# Patient Record
Sex: MALE | Race: WHITE | NOT HISPANIC OR LATINO | Employment: OTHER | URBAN - METROPOLITAN AREA
[De-identification: names, ages, dates, MRNs, and addresses within clinical notes are randomized per-mention and may not be internally consistent; named-entity substitution may affect disease eponyms.]

---

## 2012-05-30 LAB — CRC RECOMMENDATION EXT: NORMAL

## 2014-07-22 LAB — CRC RECOMMENDATION EXT: NORMAL

## 2017-01-23 ENCOUNTER — HISTORICAL (OUTPATIENT)
Dept: LAB | Facility: HOSPITAL | Age: 77
End: 2017-01-23

## 2017-10-24 LAB — CRC RECOMMENDATION EXT: NORMAL

## 2018-01-24 ENCOUNTER — HISTORICAL (OUTPATIENT)
Dept: LAB | Facility: HOSPITAL | Age: 78
End: 2018-01-24

## 2018-01-24 LAB
CHOLEST SERPL-MCNC: 176 MG/DL (ref 50–200)
CHOLEST/HDLC SERPL: 5 {RATIO} (ref 0–5)
HDLC SERPL-MCNC: 38 MG/DL (ref 35–60)
LDLC SERPL CALC-MCNC: 109 MG/DL (ref 50–140)
TRIGL SERPL-MCNC: 145 MG/DL (ref 30–150)
VLDLC SERPL CALC-MCNC: 29 MG/DL

## 2019-01-28 ENCOUNTER — HISTORICAL (OUTPATIENT)
Dept: LAB | Facility: HOSPITAL | Age: 79
End: 2019-01-28

## 2019-01-28 LAB
CHOLEST SERPL-MCNC: 171 MG/DL (ref 50–200)
CHOLEST/HDLC SERPL: 5 {RATIO} (ref 0–5)
HDLC SERPL-MCNC: 37 MG/DL (ref 35–60)
LDLC SERPL CALC-MCNC: 100 MG/DL (ref 50–140)
TRIGL SERPL-MCNC: 169 MG/DL (ref 30–150)
VLDLC SERPL CALC-MCNC: 34 MG/DL

## 2020-03-23 ENCOUNTER — HISTORICAL (OUTPATIENT)
Dept: LAB | Facility: HOSPITAL | Age: 80
End: 2020-03-23

## 2020-03-23 LAB
CHOLEST SERPL-MCNC: 154 MG/DL
CHOLEST/HDLC SERPL: 4 {RATIO} (ref 0–5)
HDLC SERPL-MCNC: 37 MG/DL (ref 35–60)
LDLC SERPL CALC-MCNC: 96 MG/DL (ref 50–140)
TRIGL SERPL-MCNC: 103 MG/DL (ref 34–140)
VLDLC SERPL CALC-MCNC: 21 MG/DL

## 2020-08-25 ENCOUNTER — HISTORICAL (OUTPATIENT)
Dept: RADIOLOGY | Facility: HOSPITAL | Age: 80
End: 2020-08-25

## 2020-08-25 LAB
ABS NEUT (OLG): 6.59
BASOPHILS # BLD AUTO: 0.05 X10(3)/MCL
BASOPHILS NFR BLD AUTO: 0.6 %
EOSINOPHIL # BLD AUTO: 0.1 X10(3)/MCL
EOSINOPHIL NFR BLD AUTO: 1.2 %
ERYTHROCYTE [DISTWIDTH] IN BLOOD BY AUTOMATED COUNT: 14 %
ERYTHROCYTE [SEDIMENTATION RATE] IN BLOOD: 7 MM/HR (ref 0–20)
HCT VFR BLD AUTO: 45.1 % (ref 39–49)
HGB BLD-MCNC: 14.9 GM/DL (ref 12.6–16.6)
IMM GRANULOCYTES # BLD AUTO: 0.13 10*3/UL (ref 0–0.1)
IMM GRANULOCYTES NFR BLD AUTO: 1.5 % (ref 0–1)
LYMPHOCYTES # BLD AUTO: 1.25 X10(3)/MCL
LYMPHOCYTES NFR BLD AUTO: 14.5 %
MCH RBC QN AUTO: 29 PG (ref 27–33)
MCHC RBC AUTO-ENTMCNC: 33 GM/DL (ref 32–35)
MCV RBC AUTO: 87.7 FL (ref 84–97)
MONOCYTES # BLD AUTO: 0.52 X10(3)/MCL
MONOCYTES NFR BLD AUTO: 6 %
NEUTROPHILS # BLD AUTO: 6.59 X10(3)/MCL
NEUTROPHILS NFR BLD AUTO: 76.2 %
PLATELET # BLD AUTO: 205 X10(3)/MCL (ref 140–450)
PMV BLD AUTO: 10 FL
RBC # BLD AUTO: 5.14 X10(6)/MCL (ref 4.3–5.6)
RHEUMATOID FACT SERPL-ACNC: <13 IU/ML
URATE SERPL-MCNC: 6.8 MG/DL (ref 3.8–7)
WBC # SPEC AUTO: 8.64 X10(3)/MCL (ref 3.4–9.2)

## 2020-09-28 ENCOUNTER — HISTORICAL (OUTPATIENT)
Dept: LAB | Facility: HOSPITAL | Age: 80
End: 2020-09-28

## 2020-09-28 LAB
CHOLEST SERPL-MCNC: 168 MG/DL
CHOLEST/HDLC SERPL: 4 {RATIO} (ref 0–5)
HDLC SERPL-MCNC: 38 MG/DL (ref 35–60)
LDLC SERPL CALC-MCNC: 107 MG/DL (ref 50–140)
TRIGL SERPL-MCNC: 117 MG/DL (ref 34–140)
VLDLC SERPL CALC-MCNC: 23 MG/DL

## 2021-05-04 ENCOUNTER — HISTORICAL (OUTPATIENT)
Dept: LAB | Facility: HOSPITAL | Age: 81
End: 2021-05-04

## 2021-05-04 LAB
CHOLEST SERPL-MCNC: 160 MG/DL
CHOLEST/HDLC SERPL: 5 {RATIO} (ref 0–5)
HDLC SERPL-MCNC: 35 MG/DL (ref 35–60)
LDLC SERPL CALC-MCNC: 102 MG/DL (ref 50–140)
TRIGL SERPL-MCNC: 117 MG/DL (ref 34–140)
VLDLC SERPL CALC-MCNC: 23 MG/DL

## 2022-12-27 DIAGNOSIS — I10 HYPERTENSION, UNSPECIFIED TYPE: Primary | ICD-10-CM

## 2022-12-27 RX ORDER — LOSARTAN POTASSIUM 50 MG/1
TABLET ORAL
Qty: 90 TABLET | Refills: 3 | Status: SHIPPED | OUTPATIENT
Start: 2022-12-27 | End: 2023-01-01

## 2022-12-28 RX ORDER — ATENOLOL 25 MG/1
25 TABLET ORAL DAILY
Qty: 30 TABLET | Refills: 2 | Status: SHIPPED | OUTPATIENT
Start: 2022-12-28 | End: 2023-01-01

## 2022-12-28 RX ORDER — ATENOLOL 25 MG/1
25 TABLET ORAL DAILY
COMMUNITY
Start: 2022-11-11 | End: 2022-12-28 | Stop reason: SDUPTHER

## 2023-01-01 ENCOUNTER — ANESTHESIA (OUTPATIENT)
Dept: SURGERY | Facility: HOSPITAL | Age: 83
DRG: 871 | End: 2023-01-01
Payer: MEDICARE

## 2023-01-01 ENCOUNTER — HOSPITAL ENCOUNTER (OUTPATIENT)
Dept: CARDIOLOGY | Facility: HOSPITAL | Age: 83
Discharge: HOME | End: 2023-09-26
Attending: INTERNAL MEDICINE | Admitting: INTERNAL MEDICINE
Payer: MEDICARE

## 2023-01-01 ENCOUNTER — LAB VISIT (OUTPATIENT)
Dept: LAB | Facility: HOSPITAL | Age: 83
End: 2023-01-01
Attending: NURSE PRACTITIONER
Payer: MEDICARE

## 2023-01-01 ENCOUNTER — ANESTHESIA EVENT (OUTPATIENT)
Dept: MEDSURG UNIT | Facility: HOSPITAL | Age: 83
End: 2023-01-01

## 2023-01-01 ENCOUNTER — ANESTHESIA (OUTPATIENT)
Dept: MEDSURG UNIT | Facility: HOSPITAL | Age: 83
End: 2023-01-01

## 2023-01-01 ENCOUNTER — HOSPITAL ENCOUNTER (EMERGENCY)
Facility: HOSPITAL | Age: 83
Discharge: HOME | End: 2023-05-12
Attending: FAMILY MEDICINE
Payer: MEDICARE

## 2023-01-01 ENCOUNTER — HOSPITAL ENCOUNTER (INPATIENT)
Facility: HOSPITAL | Age: 83
LOS: 3 days | Discharge: HOME HEALTH | DRG: 947 | End: 2023-12-21
Attending: INTERNAL MEDICINE | Admitting: INTERNAL MEDICINE
Payer: MEDICARE

## 2023-01-01 ENCOUNTER — OFFICE VISIT (OUTPATIENT)
Dept: FAMILY MEDICINE | Facility: CLINIC | Age: 83
End: 2023-01-01
Payer: MEDICARE

## 2023-01-01 ENCOUNTER — ANESTHESIA EVENT (OUTPATIENT)
Dept: CARDIOLOGY | Facility: HOSPITAL | Age: 83
End: 2023-01-01
Payer: MEDICARE

## 2023-01-01 ENCOUNTER — APPOINTMENT (OUTPATIENT)
Dept: LAB | Facility: HOSPITAL | Age: 83
End: 2023-01-01
Payer: MEDICARE

## 2023-01-01 ENCOUNTER — DOCUMENTATION ONLY (OUTPATIENT)
Dept: FAMILY MEDICINE | Facility: CLINIC | Age: 83
End: 2023-01-01
Payer: MEDICARE

## 2023-01-01 ENCOUNTER — PATIENT OUTREACH (OUTPATIENT)
Dept: ADMINISTRATIVE | Facility: CLINIC | Age: 83
End: 2023-01-01
Payer: MEDICARE

## 2023-01-01 ENCOUNTER — HOSPITAL ENCOUNTER (INPATIENT)
Facility: HOSPITAL | Age: 83
LOS: 4 days | Discharge: HOME | DRG: 872 | End: 2023-06-20
Attending: GENERAL PRACTICE | Admitting: FAMILY MEDICINE
Payer: MEDICARE

## 2023-01-01 ENCOUNTER — CLINICAL SUPPORT (OUTPATIENT)
Dept: RESPIRATORY THERAPY | Facility: HOSPITAL | Age: 83
End: 2023-01-01
Attending: NURSE PRACTITIONER
Payer: MEDICARE

## 2023-01-01 ENCOUNTER — TELEPHONE (OUTPATIENT)
Dept: FAMILY MEDICINE | Facility: CLINIC | Age: 83
End: 2023-01-01
Payer: MEDICARE

## 2023-01-01 ENCOUNTER — LAB VISIT (OUTPATIENT)
Dept: LAB | Facility: HOSPITAL | Age: 83
End: 2023-01-01
Attending: INTERNAL MEDICINE
Payer: MEDICARE

## 2023-01-01 ENCOUNTER — HOSPITAL ENCOUNTER (OUTPATIENT)
Dept: CARDIOLOGY | Facility: HOSPITAL | Age: 83
Discharge: HOME | End: 2023-09-14
Attending: INTERNAL MEDICINE
Payer: MEDICARE

## 2023-01-01 ENCOUNTER — HOSPITAL ENCOUNTER (INPATIENT)
Facility: HOSPITAL | Age: 83
LOS: 2 days | Discharge: NURSING, REHAB OR LONG-TERM CARE | DRG: 308 | End: 2023-12-29
Attending: STUDENT IN AN ORGANIZED HEALTH CARE EDUCATION/TRAINING PROGRAM | Admitting: FAMILY MEDICINE
Payer: MEDICARE

## 2023-01-01 ENCOUNTER — HOSPITAL ENCOUNTER (INPATIENT)
Facility: HOSPITAL | Age: 83
LOS: 3 days | Discharge: NURSING, REHAB OR LONG-TERM CARE | DRG: 314 | End: 2023-12-18
Attending: GENERAL PRACTICE | Admitting: FAMILY MEDICINE
Payer: MEDICARE

## 2023-01-01 ENCOUNTER — HOSPITAL ENCOUNTER (INPATIENT)
Facility: HOSPITAL | Age: 83
LOS: 8 days | Discharge: HOME | DRG: 871 | End: 2023-07-03
Attending: GENERAL PRACTICE | Admitting: INTERNAL MEDICINE
Payer: MEDICARE

## 2023-01-01 ENCOUNTER — ANESTHESIA (OUTPATIENT)
Dept: CARDIOLOGY | Facility: HOSPITAL | Age: 83
End: 2023-01-01
Payer: MEDICARE

## 2023-01-01 ENCOUNTER — INFUSION (OUTPATIENT)
Dept: INFUSION THERAPY | Facility: HOSPITAL | Age: 83
End: 2023-01-01
Attending: FAMILY MEDICINE
Payer: MEDICARE

## 2023-01-01 ENCOUNTER — ANESTHESIA EVENT (OUTPATIENT)
Dept: SURGERY | Facility: HOSPITAL | Age: 83
DRG: 871 | End: 2023-01-01
Payer: MEDICARE

## 2023-01-01 VITALS
TEMPERATURE: 98 F | WEIGHT: 170.44 LBS | RESPIRATION RATE: 21 BRPM | DIASTOLIC BLOOD PRESSURE: 73 MMHG | HEIGHT: 65 IN | SYSTOLIC BLOOD PRESSURE: 124 MMHG | OXYGEN SATURATION: 94 % | BODY MASS INDEX: 28.4 KG/M2 | HEART RATE: 83 BPM

## 2023-01-01 VITALS
BODY MASS INDEX: 28.39 KG/M2 | DIASTOLIC BLOOD PRESSURE: 64 MMHG | HEART RATE: 60 BPM | TEMPERATURE: 97 F | WEIGHT: 170.38 LBS | RESPIRATION RATE: 16 BRPM | SYSTOLIC BLOOD PRESSURE: 130 MMHG | HEIGHT: 65 IN | OXYGEN SATURATION: 98 %

## 2023-01-01 VITALS
OXYGEN SATURATION: 98 % | WEIGHT: 159 LBS | HEART RATE: 86 BPM | SYSTOLIC BLOOD PRESSURE: 98 MMHG | RESPIRATION RATE: 16 BRPM | DIASTOLIC BLOOD PRESSURE: 62 MMHG | TEMPERATURE: 97 F | BODY MASS INDEX: 26.49 KG/M2 | HEIGHT: 65 IN

## 2023-01-01 VITALS
OXYGEN SATURATION: 97 % | WEIGHT: 156.94 LBS | WEIGHT: 157.63 LBS | BODY MASS INDEX: 26.26 KG/M2 | BODY MASS INDEX: 26.15 KG/M2 | HEART RATE: 64 BPM | HEIGHT: 65 IN | TEMPERATURE: 98 F | DIASTOLIC BLOOD PRESSURE: 62 MMHG | RESPIRATION RATE: 21 BRPM | TEMPERATURE: 98 F | HEART RATE: 78 BPM | SYSTOLIC BLOOD PRESSURE: 109 MMHG | SYSTOLIC BLOOD PRESSURE: 105 MMHG | HEIGHT: 65 IN | DIASTOLIC BLOOD PRESSURE: 57 MMHG | RESPIRATION RATE: 22 BRPM | OXYGEN SATURATION: 91 %

## 2023-01-01 VITALS
WEIGHT: 164 LBS | BODY MASS INDEX: 27.32 KG/M2 | HEIGHT: 65 IN | OXYGEN SATURATION: 98 % | TEMPERATURE: 98 F | DIASTOLIC BLOOD PRESSURE: 65 MMHG | SYSTOLIC BLOOD PRESSURE: 103 MMHG | HEART RATE: 73 BPM | RESPIRATION RATE: 18 BRPM

## 2023-01-01 VITALS
SYSTOLIC BLOOD PRESSURE: 88 MMHG | BODY MASS INDEX: 27.32 KG/M2 | TEMPERATURE: 97 F | DIASTOLIC BLOOD PRESSURE: 44 MMHG | WEIGHT: 164 LBS | OXYGEN SATURATION: 98 % | RESPIRATION RATE: 16 BRPM | HEART RATE: 81 BPM | HEIGHT: 65 IN

## 2023-01-01 VITALS
WEIGHT: 155 LBS | HEIGHT: 65 IN | RESPIRATION RATE: 18 BRPM | OXYGEN SATURATION: 95 % | TEMPERATURE: 98 F | RESPIRATION RATE: 20 BRPM | DIASTOLIC BLOOD PRESSURE: 67 MMHG | HEIGHT: 65 IN | TEMPERATURE: 98 F | WEIGHT: 155.19 LBS | HEART RATE: 102 BPM | BODY MASS INDEX: 25.85 KG/M2 | SYSTOLIC BLOOD PRESSURE: 142 MMHG | BODY MASS INDEX: 25.83 KG/M2 | DIASTOLIC BLOOD PRESSURE: 84 MMHG | OXYGEN SATURATION: 99 % | HEART RATE: 92 BPM | SYSTOLIC BLOOD PRESSURE: 102 MMHG

## 2023-01-01 VITALS
HEART RATE: 58 BPM | HEIGHT: 65 IN | BODY MASS INDEX: 28.32 KG/M2 | DIASTOLIC BLOOD PRESSURE: 70 MMHG | TEMPERATURE: 99 F | OXYGEN SATURATION: 100 % | WEIGHT: 170 LBS | RESPIRATION RATE: 18 BRPM | SYSTOLIC BLOOD PRESSURE: 137 MMHG

## 2023-01-01 VITALS
DIASTOLIC BLOOD PRESSURE: 68 MMHG | DIASTOLIC BLOOD PRESSURE: 65 MMHG | BODY MASS INDEX: 26.16 KG/M2 | SYSTOLIC BLOOD PRESSURE: 108 MMHG | WEIGHT: 146.63 LBS | HEART RATE: 59 BPM | TEMPERATURE: 96 F | HEIGHT: 65 IN | BODY MASS INDEX: 24.43 KG/M2 | RESPIRATION RATE: 16 BRPM | SYSTOLIC BLOOD PRESSURE: 97 MMHG | RESPIRATION RATE: 22 BRPM | WEIGHT: 157 LBS | HEIGHT: 65 IN | HEART RATE: 71 BPM | TEMPERATURE: 96 F

## 2023-01-01 VITALS
DIASTOLIC BLOOD PRESSURE: 56 MMHG | HEIGHT: 65 IN | RESPIRATION RATE: 20 BRPM | HEART RATE: 78 BPM | OXYGEN SATURATION: 99 % | WEIGHT: 145.75 LBS | TEMPERATURE: 98 F | BODY MASS INDEX: 24.28 KG/M2 | SYSTOLIC BLOOD PRESSURE: 135 MMHG

## 2023-01-01 VITALS
OXYGEN SATURATION: 98 % | RESPIRATION RATE: 18 BRPM | WEIGHT: 155 LBS | BODY MASS INDEX: 25.83 KG/M2 | DIASTOLIC BLOOD PRESSURE: 70 MMHG | TEMPERATURE: 98 F | HEIGHT: 65 IN | SYSTOLIC BLOOD PRESSURE: 106 MMHG | HEART RATE: 82 BPM

## 2023-01-01 VITALS
WEIGHT: 173 LBS | OXYGEN SATURATION: 97 % | DIASTOLIC BLOOD PRESSURE: 74 MMHG | HEIGHT: 65 IN | SYSTOLIC BLOOD PRESSURE: 130 MMHG | RESPIRATION RATE: 20 BRPM | BODY MASS INDEX: 28.82 KG/M2 | HEART RATE: 64 BPM | TEMPERATURE: 97 F

## 2023-01-01 VITALS
DIASTOLIC BLOOD PRESSURE: 77 MMHG | RESPIRATION RATE: 18 BRPM | HEIGHT: 65 IN | HEART RATE: 64 BPM | OXYGEN SATURATION: 98 % | TEMPERATURE: 98 F | BODY MASS INDEX: 28.25 KG/M2 | SYSTOLIC BLOOD PRESSURE: 147 MMHG | WEIGHT: 169.56 LBS

## 2023-01-01 DIAGNOSIS — R53.81 PHYSICAL DECONDITIONING: ICD-10-CM

## 2023-01-01 DIAGNOSIS — R00.9 ABNORMAL HEART RATE: ICD-10-CM

## 2023-01-01 DIAGNOSIS — I10 PRIMARY HYPERTENSION: Chronic | ICD-10-CM

## 2023-01-01 DIAGNOSIS — I95.0 IDIOPATHIC HYPOTENSION: Primary | ICD-10-CM

## 2023-01-01 DIAGNOSIS — Z79.01 LONG TERM (CURRENT) USE OF ANTICOAGULANTS: ICD-10-CM

## 2023-01-01 DIAGNOSIS — K50.00 TERMINAL ILEITIS WITHOUT COMPLICATION: Primary | ICD-10-CM

## 2023-01-01 DIAGNOSIS — R53.1 WEAKNESS: ICD-10-CM

## 2023-01-01 DIAGNOSIS — I25.10 CORONARY ARTERY DISEASE, UNSPECIFIED VESSEL OR LESION TYPE, UNSPECIFIED WHETHER ANGINA PRESENT, UNSPECIFIED WHETHER NATIVE OR TRANSPLANTED HEART: Primary | ICD-10-CM

## 2023-01-01 DIAGNOSIS — E86.0 DEHYDRATION: ICD-10-CM

## 2023-01-01 DIAGNOSIS — N39.0 URINARY TRACT INFECTION ASSOCIATED WITH INDWELLING URETHRAL CATHETER, INITIAL ENCOUNTER: Primary | ICD-10-CM

## 2023-01-01 DIAGNOSIS — G62.9 NEUROPATHY: ICD-10-CM

## 2023-01-01 DIAGNOSIS — I48.91 ATRIAL FIBRILLATION: ICD-10-CM

## 2023-01-01 DIAGNOSIS — R33.9 URINARY RETENTION: ICD-10-CM

## 2023-01-01 DIAGNOSIS — E78.5 HYPERLIPIDEMIA, UNSPECIFIED HYPERLIPIDEMIA TYPE: Primary | ICD-10-CM

## 2023-01-01 DIAGNOSIS — Z85.038 PERSONAL HISTORY OF COLON CANCER: Primary | ICD-10-CM

## 2023-01-01 DIAGNOSIS — K50.00: ICD-10-CM

## 2023-01-01 DIAGNOSIS — E66.9 OBESITY, UNSPECIFIED CLASSIFICATION, UNSPECIFIED OBESITY TYPE, UNSPECIFIED WHETHER SERIOUS COMORBIDITY PRESENT: ICD-10-CM

## 2023-01-01 DIAGNOSIS — R06.00 DYSPNEA, UNSPECIFIED TYPE: ICD-10-CM

## 2023-01-01 DIAGNOSIS — K56.7 ILEUS DUE TO INFECTION: ICD-10-CM

## 2023-01-01 DIAGNOSIS — G62.9 NEUROPATHY: Primary | ICD-10-CM

## 2023-01-01 DIAGNOSIS — E43 SEVERE PROTEIN-CALORIE MALNUTRITION: Primary | ICD-10-CM

## 2023-01-01 DIAGNOSIS — I48.91 A-FIB: ICD-10-CM

## 2023-01-01 DIAGNOSIS — R19.7 DIARRHEA OF PRESUMED INFECTIOUS ORIGIN: ICD-10-CM

## 2023-01-01 DIAGNOSIS — I10 HYPERTENSION, UNSPECIFIED TYPE: ICD-10-CM

## 2023-01-01 DIAGNOSIS — M19.90 OSTEOARTHRITIS, UNSPECIFIED OSTEOARTHRITIS TYPE, UNSPECIFIED SITE: ICD-10-CM

## 2023-01-01 DIAGNOSIS — Z86.010 PERSONAL HISTORY OF COLONIC POLYPS: ICD-10-CM

## 2023-01-01 DIAGNOSIS — K50.00 CROHN'S DISEASE OF SMALL INTESTINE: Primary | ICD-10-CM

## 2023-01-01 DIAGNOSIS — I48.20 CHRONIC ATRIAL FIBRILLATION: ICD-10-CM

## 2023-01-01 DIAGNOSIS — I48.0 PAROXYSMAL ATRIAL FIBRILLATION: ICD-10-CM

## 2023-01-01 DIAGNOSIS — T83.511A URINARY TRACT INFECTION ASSOCIATED WITH INDWELLING URETHRAL CATHETER, INITIAL ENCOUNTER: Primary | ICD-10-CM

## 2023-01-01 DIAGNOSIS — K50.80 CROHN'S DISEASE OF BOTH SMALL AND LARGE INTESTINE WITHOUT COMPLICATION: Primary | Chronic | ICD-10-CM

## 2023-01-01 DIAGNOSIS — K50.018 CROHN'S DISEASE OF SMALL INTESTINE WITH OTHER COMPLICATION: Primary | ICD-10-CM

## 2023-01-01 DIAGNOSIS — E78.5 HYPERLIPIDEMIA, UNSPECIFIED HYPERLIPIDEMIA TYPE: ICD-10-CM

## 2023-01-01 DIAGNOSIS — R60.1 ANASARCA: ICD-10-CM

## 2023-01-01 DIAGNOSIS — R07.9 CHEST PAIN: ICD-10-CM

## 2023-01-01 DIAGNOSIS — I25.10 CORONARY ARTERY DISEASE: ICD-10-CM

## 2023-01-01 DIAGNOSIS — R00.1 BRADYCARDIA: ICD-10-CM

## 2023-01-01 DIAGNOSIS — Z91.81 RISK FOR FALLS: ICD-10-CM

## 2023-01-01 DIAGNOSIS — Z12.5 ENCOUNTER FOR SCREENING FOR MALIGNANT NEOPLASM OF PROSTATE: ICD-10-CM

## 2023-01-01 DIAGNOSIS — Z01.818 PRE-OP TESTING: ICD-10-CM

## 2023-01-01 DIAGNOSIS — R06.02 SOB (SHORTNESS OF BREATH): ICD-10-CM

## 2023-01-01 DIAGNOSIS — R29.898 WEAKNESS OF LOWER EXTREMITY, UNSPECIFIED LATERALITY: ICD-10-CM

## 2023-01-01 DIAGNOSIS — E87.20 LACTIC ACIDOSIS: Primary | ICD-10-CM

## 2023-01-01 DIAGNOSIS — I25.10 ARTERIOSCLEROSIS OF CORONARY ARTERY: ICD-10-CM

## 2023-01-01 DIAGNOSIS — S09.90XA INJURY OF HEAD, INITIAL ENCOUNTER: Primary | ICD-10-CM

## 2023-01-01 DIAGNOSIS — I48.0 PAF (PAROXYSMAL ATRIAL FIBRILLATION): ICD-10-CM

## 2023-01-01 DIAGNOSIS — R53.81 PHYSICAL DECONDITIONING: Primary | ICD-10-CM

## 2023-01-01 DIAGNOSIS — I48.0 PAROXYSMAL ATRIAL FIBRILLATION: Primary | ICD-10-CM

## 2023-01-01 DIAGNOSIS — I95.9 HYPOTENSION: Primary | ICD-10-CM

## 2023-01-01 DIAGNOSIS — S01.01XD LACERATION OF SKIN OF SCALP, SUBSEQUENT ENCOUNTER: Primary | ICD-10-CM

## 2023-01-01 DIAGNOSIS — D72.829 LEUKOCYTOSIS: ICD-10-CM

## 2023-01-01 DIAGNOSIS — R60.0 BILATERAL EDEMA OF LOWER EXTREMITY: ICD-10-CM

## 2023-01-01 DIAGNOSIS — I48.19 PERSISTENT ATRIAL FIBRILLATION: Primary | ICD-10-CM

## 2023-01-01 DIAGNOSIS — B99.9 ILEUS DUE TO INFECTION: ICD-10-CM

## 2023-01-01 DIAGNOSIS — S01.01XA LACERATION OF OCCIPITAL SCALP, INITIAL ENCOUNTER: ICD-10-CM

## 2023-01-01 DIAGNOSIS — K50.80 CROHN'S DISEASE OF BOTH SMALL AND LARGE INTESTINE WITHOUT COMPLICATION: Chronic | ICD-10-CM

## 2023-01-01 DIAGNOSIS — Z00.00 WELLNESS EXAMINATION: ICD-10-CM

## 2023-01-01 DIAGNOSIS — R00.0 TACHYCARDIA: ICD-10-CM

## 2023-01-01 DIAGNOSIS — R26.81 UNSTEADY GAIT: ICD-10-CM

## 2023-01-01 DIAGNOSIS — I48.19 PERSISTENT ATRIAL FIBRILLATION: ICD-10-CM
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
## 2023-01-01 DIAGNOSIS — I10 ESSENTIAL HYPERTENSION, MALIGNANT: Primary | ICD-10-CM

## 2023-01-01 DIAGNOSIS — I95.0 IDIOPATHIC HYPOTENSION: ICD-10-CM

## 2023-01-01 DIAGNOSIS — I10 ESSENTIAL HYPERTENSION, MALIGNANT: ICD-10-CM

## 2023-01-01 DIAGNOSIS — Z79.899 OTHER LONG TERM (CURRENT) DRUG THERAPY: ICD-10-CM

## 2023-01-01 DIAGNOSIS — Z79.899 ENCOUNTER FOR LONG-TERM (CURRENT) USE OF OTHER MEDICATIONS: ICD-10-CM

## 2023-01-01 DIAGNOSIS — Z85.038 HISTORY OF COLON CANCER: ICD-10-CM

## 2023-01-01 DIAGNOSIS — Z76.89 ESTABLISHING CARE WITH NEW DOCTOR, ENCOUNTER FOR: Primary | ICD-10-CM

## 2023-01-01 LAB
ABO + RH BLD: NORMAL
ABORH RETYPE: NORMAL
ABS NEUT CALC (OHS): 1.69 X10(3)/MCL (ref 2.1–9.2)
ABS NEUT CALC (OHS): 10.23 X10(3)/MCL (ref 2.1–9.2)
ABS NEUT CALC (OHS): 19.22 X10(3)/MCL (ref 2.1–9.2)
ABS NEUT CALC (OHS): 2.95 X10(3)/MCL (ref 2.1–9.2)
ABS NEUT CALC (OHS): 21.18 X10(3)/MCL (ref 2.1–9.2)
ABS NEUT CALC (OHS): 22.04 X10(3)/MCL (ref 2.1–9.2)
ABS NEUT CALC (OHS): 6.47 X10(3)/MCL (ref 2.1–9.2)
ABS NEUT CALC (OHS): 8.1 X10(3)/MCL (ref 2.1–9.2)
ABS NEUT CALC (OHS): 9.45 X10(3)/MCL (ref 2.1–9.2)
ABS NEUT CALC (OHS): 9.71 X10(3)/MCL (ref 2.1–9.2)
ADV 40+41 DNA STL QL NAA+NON-PROBE: NOT DETECTED
ADV 40+41 DNA STL QL NAA+NON-PROBE: NOT DETECTED
ALBUMIN SERPL-MCNC: 1.2 G/DL (ref 3.4–4.8)
ALBUMIN SERPL-MCNC: 1.5 G/DL (ref 3.4–4.8)
ALBUMIN SERPL-MCNC: 1.6 G/DL (ref 3.4–4.8)
ALBUMIN SERPL-MCNC: 1.8 G/DL (ref 3.4–4.8)
ALBUMIN SERPL-MCNC: 1.9 G/DL (ref 3.4–4.8)
ALBUMIN SERPL-MCNC: 2 G/DL (ref 3.4–4.8)
ALBUMIN SERPL-MCNC: 2 G/DL (ref 3.4–4.8)
ALBUMIN SERPL-MCNC: 2.1 G/DL (ref 3.4–4.8)
ALBUMIN SERPL-MCNC: 2.2 G/DL (ref 3.4–4.8)
ALBUMIN SERPL-MCNC: 2.3 G/DL (ref 3.4–4.8)
ALBUMIN SERPL-MCNC: 2.3 G/DL (ref 3.4–4.8)
ALBUMIN SERPL-MCNC: 2.4 G/DL (ref 3.4–4.8)
ALBUMIN SERPL-MCNC: 2.7 G/DL (ref 3.4–4.8)
ALBUMIN SERPL-MCNC: 2.8 G/DL (ref 3.4–4.8)
ALBUMIN SERPL-MCNC: 2.8 G/DL (ref 3.4–4.8)
ALBUMIN SERPL-MCNC: 2.9 G/DL (ref 3.4–4.8)
ALBUMIN SERPL-MCNC: 3.6 G/DL (ref 3.4–4.8)
ALBUMIN SERPL-MCNC: 4.1 G/DL (ref 3.4–4.8)
ALBUMIN/GLOB SERPL: 0.5 RATIO (ref 1.1–2)
ALBUMIN/GLOB SERPL: 0.7 RATIO (ref 1.1–2)
ALBUMIN/GLOB SERPL: 0.8 RATIO (ref 1.1–2)
ALBUMIN/GLOB SERPL: 0.9 RATIO (ref 1.1–2)
ALBUMIN/GLOB SERPL: 1 RATIO (ref 1.1–2)
ALBUMIN/GLOB SERPL: 1 RATIO (ref 1.1–2)
ALBUMIN/GLOB SERPL: 1.1 RATIO (ref 1.1–2)
ALBUMIN/GLOB SERPL: 1.2 RATIO (ref 1.1–2)
ALBUMIN/GLOB SERPL: 1.3 RATIO (ref 1.1–2)
ALBUMIN/GLOB SERPL: 1.7 RATIO (ref 1.1–2)
ALBUMIN/GLOB SERPL: 3.1 RATIO (ref 1.1–2)
ALP SERPL-CCNC: 27 UNIT/L (ref 40–150)
ALP SERPL-CCNC: 31 UNIT/L (ref 40–150)
ALP SERPL-CCNC: 34 UNIT/L (ref 40–150)
ALP SERPL-CCNC: 35 UNIT/L (ref 40–150)
ALP SERPL-CCNC: 36 UNIT/L (ref 40–150)
ALP SERPL-CCNC: 37 UNIT/L (ref 40–150)
ALP SERPL-CCNC: 38 UNIT/L (ref 40–150)
ALP SERPL-CCNC: 38 UNIT/L (ref 40–150)
ALP SERPL-CCNC: 39 UNIT/L (ref 40–150)
ALP SERPL-CCNC: 41 UNIT/L (ref 40–150)
ALP SERPL-CCNC: 44 UNIT/L (ref 40–150)
ALP SERPL-CCNC: 47 UNIT/L (ref 40–150)
ALP SERPL-CCNC: 49 UNIT/L (ref 40–150)
ALP SERPL-CCNC: 56 UNIT/L (ref 40–150)
ALP SERPL-CCNC: 76 UNIT/L (ref 40–150)
ALT SERPL-CCNC: 10 UNIT/L (ref 0–55)
ALT SERPL-CCNC: 12 UNIT/L (ref 0–55)
ALT SERPL-CCNC: 16 UNIT/L (ref 0–55)
ALT SERPL-CCNC: 19 UNIT/L (ref 0–55)
ALT SERPL-CCNC: 29 UNIT/L (ref 0–55)
ALT SERPL-CCNC: 47 UNIT/L (ref 0–55)
ALT SERPL-CCNC: 5 UNIT/L (ref 0–55)
ALT SERPL-CCNC: 6 UNIT/L (ref 0–55)
ALT SERPL-CCNC: 7 UNIT/L (ref 0–55)
ALT SERPL-CCNC: 7 UNIT/L (ref 0–55)
ALT SERPL-CCNC: 8 UNIT/L (ref 0–55)
ALT SERPL-CCNC: 9 UNIT/L (ref 0–55)
AMORPH URATE CRY URNS QL MICRO: ABNORMAL /HPF
AMYLASE SERPL-CCNC: 93 UNIT/L (ref 20–160)
ANION GAP SERPL CALC-SCNC: 10 MEQ/L
ANION GAP SERPL CALC-SCNC: 5 MEQ/L
ANION GAP SERPL CALC-SCNC: 6 MEQ/L
ANION GAP SERPL CALC-SCNC: 7 MEQ/L
ANION GAP SERPL CALC-SCNC: 8 MEQ/L
ANION GAP SERPL CALC-SCNC: 9 MEQ/L
ANION GAP SERPL CALC-SCNC: 9 MEQ/L
ANISOCYTOSIS BLD QL SMEAR: ABNORMAL
AORTIC ROOT ANNULUS: 3.07 CM
AORTIC ROOT ANNULUS: 3.65 CM
AORTIC VALVE CUSP SEPERATION: 0.8 CM
AORTIC VALVE CUSP SEPERATION: 0.94 CM
APPEARANCE UR: ABNORMAL
APPEARANCE UR: CLEAR
APTT PPP: 33.4 SECONDS (ref 21.4–28.3)
APTT PPP: 33.4 SECONDS (ref 21.4–28.3)
AST SERPL-CCNC: 10 UNIT/L (ref 5–34)
AST SERPL-CCNC: 12 UNIT/L (ref 5–34)
AST SERPL-CCNC: 13 UNIT/L (ref 5–34)
AST SERPL-CCNC: 14 UNIT/L (ref 5–34)
AST SERPL-CCNC: 15 UNIT/L (ref 5–34)
AST SERPL-CCNC: 15 UNIT/L (ref 5–34)
AST SERPL-CCNC: 16 UNIT/L (ref 5–34)
AST SERPL-CCNC: 16 UNIT/L (ref 5–34)
AST SERPL-CCNC: 17 UNIT/L (ref 5–34)
AST SERPL-CCNC: 18 UNIT/L (ref 5–34)
AST SERPL-CCNC: 18 UNIT/L (ref 5–34)
AST SERPL-CCNC: 19 UNIT/L (ref 5–34)
AST SERPL-CCNC: 21 UNIT/L (ref 5–34)
AST SERPL-CCNC: 23 UNIT/L (ref 5–34)
AST SERPL-CCNC: 24 UNIT/L (ref 5–34)
AST SERPL-CCNC: 32 UNIT/L (ref 5–34)
AST SERPL-CCNC: 37 UNIT/L (ref 5–34)
AST SERPL-CCNC: 9 UNIT/L (ref 5–34)
ASTRO TYP 1-8 RNA STL QL NAA+NON-PROBE: NOT DETECTED
ASTRO TYP 1-8 RNA STL QL NAA+NON-PROBE: NOT DETECTED
AV INDEX (PROSTH): 0.82
AV MEAN GRADIENT: 4 MMHG
AV PEAK GRADIENT: 6 MMHG
AV PEAK GRADIENT: 8 MMHG
AV VALVE AREA: 2.44 CM2
AV VELOCITY RATIO: 0.69
BACTERIA #/AREA URNS AUTO: ABNORMAL /HPF
BACTERIA #/AREA URNS AUTO: ABNORMAL /HPF
BACTERIA #/AREA URNS AUTO: NORMAL /HPF
BACTERIA #/AREA URNS AUTO: NORMAL /HPF
BACTERIA BLD CULT: NORMAL
BACTERIA STL CULT: NORMAL
BACTERIA UR CULT: ABNORMAL
BASOPHILS # BLD AUTO: 0.01 X10(3)/MCL
BASOPHILS # BLD AUTO: 0.02 X10(3)/MCL
BASOPHILS # BLD AUTO: 0.03 X10(3)/MCL
BASOPHILS # BLD AUTO: 0.04 X10(3)/MCL
BASOPHILS # BLD AUTO: 0.04 X10(3)/MCL
BASOPHILS # BLD AUTO: 0.05 X10(3)/MCL
BASOPHILS # BLD AUTO: 0.06 X10(3)/MCL
BASOPHILS # BLD AUTO: 0.08 X10(3)/MCL
BASOPHILS NFR BLD AUTO: 0.1 %
BASOPHILS NFR BLD AUTO: 0.1 %
BASOPHILS NFR BLD AUTO: 0.2 %
BASOPHILS NFR BLD AUTO: 0.3 %
BASOPHILS NFR BLD AUTO: 0.4 %
BASOPHILS NFR BLD AUTO: 0.6 %
BASOPHILS NFR BLD AUTO: 0.6 %
BASOPHILS NFR BLD AUTO: 0.8 %
BASOPHILS NFR BLD AUTO: 1 %
BASOPHILS NFR BLD MANUAL: 0.1 X10(3)/MCL (ref 0–0.2)
BASOPHILS NFR BLD MANUAL: 0.13 X10(3)/MCL (ref 0–0.2)
BASOPHILS NFR BLD MANUAL: 1 % (ref 0–2)
BASOPHILS NFR BLD MANUAL: 1 % (ref 0–2)
BILIRUB SERPL-MCNC: 0.2 MG/DL
BILIRUB SERPL-MCNC: 0.3 MG/DL
BILIRUB SERPL-MCNC: 0.4 MG/DL
BILIRUB SERPL-MCNC: 0.5 MG/DL
BILIRUB UR QL STRIP.AUTO: NEGATIVE
BILIRUB UR QL STRIP.AUTO: NEGATIVE
BILIRUB UR QL STRIP.AUTO: NEGATIVE MG/DL
BILIRUBIN DIRECT+TOT PNL SERPL-MCNC: 0.5 MG/DL
BILIRUBIN DIRECT+TOT PNL SERPL-MCNC: 0.6 MG/DL
BILIRUBIN DIRECT+TOT PNL SERPL-MCNC: 0.6 MG/DL
BILIRUBIN DIRECT+TOT PNL SERPL-MCNC: 0.7 MG/DL
BILIRUBIN DIRECT+TOT PNL SERPL-MCNC: 0.8 MG/DL
BLD PROD TYP BPU: NORMAL
BLOOD UNIT EXPIRATION DATE: NORMAL
BLOOD UNIT TYPE CODE: 5100
BNP BLD-MCNC: 181.7 PG/ML
BNP BLD-MCNC: 319 PG/ML
BSA FOR ECHO PROCEDURE: 1.8 M2
BSA FOR ECHO PROCEDURE: 1.88 M2
BUN SERPL-MCNC: 10 MG/DL (ref 8.4–25.7)
BUN SERPL-MCNC: 12 MG/DL (ref 8.4–25.7)
BUN SERPL-MCNC: 14 MG/DL (ref 8.4–25.7)
BUN SERPL-MCNC: 15.8 MG/DL (ref 8.4–25.7)
BUN SERPL-MCNC: 16 MG/DL (ref 8.4–25.7)
BUN SERPL-MCNC: 17 MG/DL (ref 8.4–25.7)
BUN SERPL-MCNC: 18 MG/DL (ref 8.4–25.7)
BUN SERPL-MCNC: 19 MG/DL (ref 8.4–25.7)
BUN SERPL-MCNC: 20 MG/DL (ref 8.4–25.7)
BUN SERPL-MCNC: 22 MG/DL (ref 8.4–25.7)
BUN SERPL-MCNC: 22 MG/DL (ref 8.4–25.7)
BUN SERPL-MCNC: 23 MG/DL (ref 8.4–25.7)
BUN SERPL-MCNC: 23 MG/DL (ref 8.4–25.7)
BUN SERPL-MCNC: 24 MG/DL (ref 8.4–25.7)
BUN SERPL-MCNC: 25 MG/DL (ref 8.4–25.7)
BUN SERPL-MCNC: 28 MG/DL (ref 8.4–25.7)
BUN SERPL-MCNC: 33 MG/DL (ref 8.4–25.7)
BUN SERPL-MCNC: 34 MG/DL (ref 8.4–25.7)
BUN SERPL-MCNC: 35 MG/DL (ref 8.4–25.7)
BUN SERPL-MCNC: 4 MG/DL (ref 8.4–25.7)
BUN SERPL-MCNC: 4 MG/DL (ref 8.4–25.7)
BUN SERPL-MCNC: 5 MG/DL (ref 8.4–25.7)
BUN SERPL-MCNC: 7 MG/DL (ref 8.4–25.7)
BUN SERPL-MCNC: 8 MG/DL (ref 8.4–25.7)
C CAYETANENSIS DNA STL QL NAA+NON-PROBE: NOT DETECTED
C CAYETANENSIS DNA STL QL NAA+NON-PROBE: NOT DETECTED
C COLI+JEJ+UPSA DNA STL QL NAA+NON-PROBE: NOT DETECTED
C COLI+JEJ+UPSA DNA STL QL NAA+NON-PROBE: NOT DETECTED
CALCIUM SERPL-MCNC: 10.1 MG/DL (ref 8.8–10)
CALCIUM SERPL-MCNC: 7 MG/DL (ref 8.8–10)
CALCIUM SERPL-MCNC: 7 MG/DL (ref 8.8–10)
CALCIUM SERPL-MCNC: 7.1 MG/DL (ref 8.8–10)
CALCIUM SERPL-MCNC: 7.2 MG/DL (ref 8.8–10)
CALCIUM SERPL-MCNC: 7.3 MG/DL (ref 8.8–10)
CALCIUM SERPL-MCNC: 7.3 MG/DL (ref 8.8–10)
CALCIUM SERPL-MCNC: 7.6 MG/DL (ref 8.8–10)
CALCIUM SERPL-MCNC: 7.7 MG/DL (ref 8.8–10)
CALCIUM SERPL-MCNC: 7.8 MG/DL (ref 8.8–10)
CALCIUM SERPL-MCNC: 7.8 MG/DL (ref 8.8–10)
CALCIUM SERPL-MCNC: 7.9 MG/DL (ref 8.8–10)
CALCIUM SERPL-MCNC: 8 MG/DL (ref 8.8–10)
CALCIUM SERPL-MCNC: 8.1 MG/DL (ref 8.8–10)
CALCIUM SERPL-MCNC: 8.2 MG/DL (ref 8.8–10)
CALCIUM SERPL-MCNC: 8.3 MG/DL (ref 8.8–10)
CALCIUM SERPL-MCNC: 8.4 MG/DL (ref 8.8–10)
CALCIUM SERPL-MCNC: 8.4 MG/DL (ref 8.8–10)
CALCIUM SERPL-MCNC: 8.6 MG/DL (ref 8.8–10)
CALCIUM SERPL-MCNC: 9.2 MG/DL (ref 8.8–10)
CHLORIDE SERPL-SCNC: 104 MMOL/L (ref 98–107)
CHLORIDE SERPL-SCNC: 105 MMOL/L (ref 98–107)
CHLORIDE SERPL-SCNC: 105 MMOL/L (ref 98–107)
CHLORIDE SERPL-SCNC: 106 MMOL/L (ref 98–107)
CHLORIDE SERPL-SCNC: 106 MMOL/L (ref 98–107)
CHLORIDE SERPL-SCNC: 107 MMOL/L (ref 98–107)
CHLORIDE SERPL-SCNC: 108 MMOL/L (ref 98–107)
CHLORIDE SERPL-SCNC: 109 MMOL/L (ref 98–107)
CHLORIDE SERPL-SCNC: 110 MMOL/L (ref 98–107)
CHLORIDE SERPL-SCNC: 111 MMOL/L (ref 98–107)
CHLORIDE SERPL-SCNC: 111 MMOL/L (ref 98–107)
CHLORIDE SERPL-SCNC: 112 MMOL/L (ref 98–107)
CHLORIDE SERPL-SCNC: 113 MMOL/L (ref 98–107)
CHLORIDE SERPL-SCNC: 113 MMOL/L (ref 98–107)
CHLORIDE SERPL-SCNC: 116 MMOL/L (ref 98–107)
CHLORIDE SERPL-SCNC: 117 MMOL/L (ref 98–107)
CHLORIDE SERPL-SCNC: 118 MMOL/L (ref 98–107)
CHOLEST SERPL-MCNC: 166 MG/DL
CHOLEST/HDLC SERPL: 3 {RATIO} (ref 0–5)
CK MB SERPL-MCNC: 4.7 NG/ML
CK SERPL-CCNC: 52 U/L (ref 30–200)
CK SERPL-CCNC: 91 U/L (ref 30–200)
CO2 SERPL-SCNC: 13 MMOL/L (ref 23–31)
CO2 SERPL-SCNC: 17 MMOL/L (ref 23–31)
CO2 SERPL-SCNC: 18 MMOL/L (ref 23–31)
CO2 SERPL-SCNC: 19 MMOL/L (ref 23–31)
CO2 SERPL-SCNC: 19 MMOL/L (ref 23–31)
CO2 SERPL-SCNC: 20 MMOL/L (ref 23–31)
CO2 SERPL-SCNC: 21 MMOL/L (ref 23–31)
CO2 SERPL-SCNC: 22 MMOL/L (ref 23–31)
CO2 SERPL-SCNC: 22 MMOL/L (ref 23–31)
CO2 SERPL-SCNC: 23 MMOL/L (ref 23–31)
CO2 SERPL-SCNC: 24 MMOL/L (ref 23–31)
CO2 SERPL-SCNC: 26 MMOL/L (ref 23–31)
CO2 SERPL-SCNC: 28 MMOL/L (ref 23–31)
COLOR STL: ABNORMAL
COLOR UR AUTO: YELLOW
COLOR UR AUTO: YELLOW
COLOR UR: YELLOW
CONSISTENCY STL: ABNORMAL
CREAT SERPL-MCNC: 0.76 MG/DL (ref 0.73–1.18)
CREAT SERPL-MCNC: 0.8 MG/DL (ref 0.73–1.18)
CREAT SERPL-MCNC: 0.85 MG/DL (ref 0.73–1.18)
CREAT SERPL-MCNC: 0.98 MG/DL (ref 0.73–1.18)
CREAT SERPL-MCNC: 1.03 MG/DL (ref 0.73–1.18)
CREAT SERPL-MCNC: 1.04 MG/DL (ref 0.73–1.18)
CREAT SERPL-MCNC: 1.06 MG/DL (ref 0.73–1.18)
CREAT SERPL-MCNC: 1.06 MG/DL (ref 0.73–1.18)
CREAT SERPL-MCNC: 1.07 MG/DL (ref 0.73–1.18)
CREAT SERPL-MCNC: 1.08 MG/DL (ref 0.73–1.18)
CREAT SERPL-MCNC: 1.09 MG/DL (ref 0.73–1.18)
CREAT SERPL-MCNC: 1.09 MG/DL (ref 0.73–1.18)
CREAT SERPL-MCNC: 1.16 MG/DL (ref 0.73–1.18)
CREAT SERPL-MCNC: 1.19 MG/DL (ref 0.73–1.18)
CREAT SERPL-MCNC: 1.2 MG/DL (ref 0.73–1.18)
CREAT SERPL-MCNC: 1.22 MG/DL (ref 0.73–1.18)
CREAT SERPL-MCNC: 1.23 MG/DL (ref 0.73–1.18)
CREAT SERPL-MCNC: 1.24 MG/DL (ref 0.73–1.18)
CREAT SERPL-MCNC: 1.26 MG/DL (ref 0.73–1.18)
CREAT SERPL-MCNC: 1.31 MG/DL (ref 0.73–1.18)
CREAT SERPL-MCNC: 1.31 MG/DL (ref 0.73–1.18)
CREAT SERPL-MCNC: 1.33 MG/DL (ref 0.73–1.18)
CREAT SERPL-MCNC: 1.36 MG/DL (ref 0.73–1.18)
CREAT SERPL-MCNC: 1.43 MG/DL (ref 0.73–1.18)
CREAT SERPL-MCNC: 1.46 MG/DL (ref 0.73–1.18)
CREAT SERPL-MCNC: 1.63 MG/DL (ref 0.73–1.18)
CREAT SERPL-MCNC: 1.76 MG/DL (ref 0.73–1.18)
CREAT SERPL-MCNC: 1.8 MG/DL (ref 0.73–1.18)
CREAT SERPL-MCNC: 1.82 MG/DL (ref 0.73–1.18)
CREAT/UREA NIT SERPL: 12
CREAT/UREA NIT SERPL: 13
CREAT/UREA NIT SERPL: 15
CREAT/UREA NIT SERPL: 17
CREAT/UREA NIT SERPL: 19
CREAT/UREA NIT SERPL: 19
CREAT/UREA NIT SERPL: 21
CREAT/UREA NIT SERPL: 6
CREAT/UREA NIT SERPL: 9
CROSSMATCH INTERPRETATION: NORMAL
CRP SERPL-MCNC: 134.6 MG/L
CRYPTOSP DNA STL QL NAA+NON-PROBE: NOT DETECTED
CRYPTOSP DNA STL QL NAA+NON-PROBE: NOT DETECTED
CV ECHO LV RWT: 0.42 CM
CV ECHO LV RWT: 0.53 CM
D DIMER PPP IA.FEU-MCNC: 0.19 UG/ML FEU (ref 0.19–0.59)
DISPENSE STATUS: NORMAL
DOP CALC AO PEAK VEL: 1.2 M/S
DOP CALC AO PEAK VEL: 1.4 M/S
DOP CALC AO VTI: 23.9 CM
DOP CALC LVOT AREA: 3 CM2
DOP CALC LVOT DIAMETER: 1.94 CM
DOP CALC LVOT PEAK VEL: 0.97 M/S
DOP CALC LVOT STROKE VOLUME: 58.2 CM3
DOP CALCLVOT PEAK VEL VTI: 19.7 CM
E HISTOLYT DNA STL QL NAA+NON-PROBE: NOT DETECTED
E HISTOLYT DNA STL QL NAA+NON-PROBE: NOT DETECTED
E WAVE DECELERATION TIME: 168.62 MSEC
E WAVE DECELERATION TIME: 272.71 MSEC
E/A RATIO: 0.85
E/A RATIO: 62
EAEC PAA PLAS AGGR+AATA ST NAA+NON-PRB: NOT DETECTED
EAEC PAA PLAS AGGR+AATA ST NAA+NON-PRB: NOT DETECTED
EC STX1+STX2 GENES STL QL NAA+NON-PROBE: NOT DETECTED
EC STX1+STX2 GENES STL QL NAA+NON-PROBE: NOT DETECTED
ECHO LV POSTERIOR WALL: 1.01 CM (ref 0.6–1.1)
ECHO LV POSTERIOR WALL: 1.31 CM (ref 0.6–1.1)
EJECTION FRACTION: 59 %
EOSINOPHIL # BLD AUTO: 0 X10(3)/MCL (ref 0–0.9)
EOSINOPHIL # BLD AUTO: 0.01 X10(3)/MCL (ref 0–0.9)
EOSINOPHIL # BLD AUTO: 0.01 X10(3)/MCL (ref 0–0.9)
EOSINOPHIL # BLD AUTO: 0.02 X10(3)/MCL (ref 0–0.9)
EOSINOPHIL # BLD AUTO: 0.04 X10(3)/MCL (ref 0–0.9)
EOSINOPHIL # BLD AUTO: 0.05 X10(3)/MCL (ref 0–0.9)
EOSINOPHIL # BLD AUTO: 0.05 X10(3)/MCL (ref 0–0.9)
EOSINOPHIL # BLD AUTO: 0.06 X10(3)/MCL (ref 0–0.9)
EOSINOPHIL # BLD AUTO: 0.06 X10(3)/MCL (ref 0–0.9)
EOSINOPHIL # BLD AUTO: 0.07 X10(3)/MCL (ref 0–0.9)
EOSINOPHIL # BLD AUTO: 0.08 X10(3)/MCL (ref 0–0.9)
EOSINOPHIL # BLD AUTO: 0.14 X10(3)/MCL (ref 0–0.9)
EOSINOPHIL NFR BLD AUTO: 0 %
EOSINOPHIL NFR BLD AUTO: 0.1 %
EOSINOPHIL NFR BLD AUTO: 0.2 %
EOSINOPHIL NFR BLD AUTO: 0.2 %
EOSINOPHIL NFR BLD AUTO: 0.4 %
EOSINOPHIL NFR BLD AUTO: 0.5 %
EOSINOPHIL NFR BLD AUTO: 0.5 %
EOSINOPHIL NFR BLD AUTO: 0.7 %
EOSINOPHIL NFR BLD AUTO: 1.1 %
EOSINOPHIL NFR BLD AUTO: 1.2 %
EOSINOPHIL NFR BLD AUTO: 1.3 %
EOSINOPHIL NFR BLD AUTO: 1.8 %
EOSINOPHIL NFR BLD MANUAL: 0.04 X10(3)/MCL (ref 0–0.9)
EOSINOPHIL NFR BLD MANUAL: 0.1 X10(3)/MCL (ref 0–0.9)
EOSINOPHIL NFR BLD MANUAL: 0.2 X10(3)/MCL (ref 0–0.9)
EOSINOPHIL NFR BLD MANUAL: 1 % (ref 0–8)
EOSINOPHIL NFR BLD MANUAL: 1 % (ref 0–8)
EOSINOPHIL NFR BLD MANUAL: 1.09 X10(3)/MCL (ref 0–0.9)
EOSINOPHIL NFR BLD MANUAL: 2 % (ref 0–8)
EOSINOPHIL NFR BLD MANUAL: 5 % (ref 0–8)
EPEC EAE GENE STL QL NAA+NON-PROBE: NOT DETECTED
EPEC EAE GENE STL QL NAA+NON-PROBE: NOT DETECTED
ERYTHROCYTE [DISTWIDTH] IN BLOOD BY AUTOMATED COUNT: 13.6 % (ref 11.5–17)
ERYTHROCYTE [DISTWIDTH] IN BLOOD BY AUTOMATED COUNT: 13.7 % (ref 11.5–17)
ERYTHROCYTE [DISTWIDTH] IN BLOOD BY AUTOMATED COUNT: 14.1 % (ref 11.5–17)
ERYTHROCYTE [DISTWIDTH] IN BLOOD BY AUTOMATED COUNT: 14.3 % (ref 11.5–17)
ERYTHROCYTE [DISTWIDTH] IN BLOOD BY AUTOMATED COUNT: 14.4 % (ref 11.5–17)
ERYTHROCYTE [DISTWIDTH] IN BLOOD BY AUTOMATED COUNT: 14.4 % (ref 11.5–17)
ERYTHROCYTE [DISTWIDTH] IN BLOOD BY AUTOMATED COUNT: 14.6 % (ref 11.5–17)
ERYTHROCYTE [DISTWIDTH] IN BLOOD BY AUTOMATED COUNT: 14.7 % (ref 11.5–17)
ERYTHROCYTE [DISTWIDTH] IN BLOOD BY AUTOMATED COUNT: 14.9 % (ref 11.5–17)
ERYTHROCYTE [DISTWIDTH] IN BLOOD BY AUTOMATED COUNT: 15 % (ref 11.5–17)
ERYTHROCYTE [DISTWIDTH] IN BLOOD BY AUTOMATED COUNT: 15.1 % (ref 11.5–17)
ERYTHROCYTE [DISTWIDTH] IN BLOOD BY AUTOMATED COUNT: 15.5 % (ref 11.5–17)
ERYTHROCYTE [DISTWIDTH] IN BLOOD BY AUTOMATED COUNT: 16.3 % (ref 11.5–17)
ERYTHROCYTE [DISTWIDTH] IN BLOOD BY AUTOMATED COUNT: 16.6 % (ref 11.5–17)
ERYTHROCYTE [DISTWIDTH] IN BLOOD BY AUTOMATED COUNT: 16.8 % (ref 11.5–17)
ERYTHROCYTE [DISTWIDTH] IN BLOOD BY AUTOMATED COUNT: 16.8 % (ref 11.5–17)
ERYTHROCYTE [DISTWIDTH] IN BLOOD BY AUTOMATED COUNT: 16.9 % (ref 11.5–17)
ERYTHROCYTE [DISTWIDTH] IN BLOOD BY AUTOMATED COUNT: 17 % (ref 11.5–17)
ERYTHROCYTE [DISTWIDTH] IN BLOOD BY AUTOMATED COUNT: 17.2 % (ref 11.5–17)
ERYTHROCYTE [DISTWIDTH] IN BLOOD BY AUTOMATED COUNT: 17.3 % (ref 11.5–17)
ERYTHROCYTE [DISTWIDTH] IN BLOOD BY AUTOMATED COUNT: 17.5 % (ref 11.5–17)
ERYTHROCYTE [DISTWIDTH] IN BLOOD BY AUTOMATED COUNT: 18.1 % (ref 11.5–17)
ERYTHROCYTE [SEDIMENTATION RATE] IN BLOOD: 17 MM/HR (ref 0–15)
ETEC LTA+ST1A+ST1B TOX ST NAA+NON-PROBE: NOT DETECTED
ETEC LTA+ST1A+ST1B TOX ST NAA+NON-PROBE: NOT DETECTED
FECAL LEUKOCYTE (OHS): POSITIVE
FLUAV AG UPPER RESP QL IA.RAPID: NOT DETECTED
FLUAV AG UPPER RESP QL IA.RAPID: NOT DETECTED
FLUBV AG UPPER RESP QL IA.RAPID: NOT DETECTED
FLUBV AG UPPER RESP QL IA.RAPID: NOT DETECTED
FRACTIONAL SHORTENING: 12 % (ref 28–44)
FRACTIONAL SHORTENING: 33 % (ref 28–44)
G LAMBLIA DNA STL QL NAA+NON-PROBE: NOT DETECTED
G LAMBLIA DNA STL QL NAA+NON-PROBE: NOT DETECTED
GAMMA INTERFERON BACKGROUND BLD IA-ACNC: 0.04 IU/ML
GFR SERPLBLD CREATININE-BSD FMLA CKD-EPI: 36 MLS/MIN/1.73/M2
GFR SERPLBLD CREATININE-BSD FMLA CKD-EPI: 37 MLS/MIN/1.73/M2
GFR SERPLBLD CREATININE-BSD FMLA CKD-EPI: 38 MLS/MIN/1.73/M2
GFR SERPLBLD CREATININE-BSD FMLA CKD-EPI: 42 MLS/MIN/1.73/M2
GFR SERPLBLD CREATININE-BSD FMLA CKD-EPI: 47 MLS/MIN/1.73/M2
GFR SERPLBLD CREATININE-BSD FMLA CKD-EPI: 49 MLS/MIN/1.73/M2
GFR SERPLBLD CREATININE-BSD FMLA CKD-EPI: 51 MLS/MIN/1.73/M2
GFR SERPLBLD CREATININE-BSD FMLA CKD-EPI: 53 MLS/MIN/1.73/M2
GFR SERPLBLD CREATININE-BSD FMLA CKD-EPI: 54 MLS/MIN/1.73/M2
GFR SERPLBLD CREATININE-BSD FMLA CKD-EPI: 54 MLS/MIN/1.73/M2
GFR SERPLBLD CREATININE-BSD FMLA CKD-EPI: 56 MLS/MIN/1.73/M2
GFR SERPLBLD CREATININE-BSD FMLA CKD-EPI: 57 MLS/MIN/1.73/M2
GFR SERPLBLD CREATININE-BSD FMLA CKD-EPI: 58 MLS/MIN/1.73/M2
GFR SERPLBLD CREATININE-BSD FMLA CKD-EPI: 58 MLS/MIN/1.73/M2
GFR SERPLBLD CREATININE-BSD FMLA CKD-EPI: 60 MLS/MIN/1.73/M2
GFR SERPLBLD CREATININE-BSD FMLA CKD-EPI: 60 MLS/MIN/1.73/M2
GFR SERPLBLD CREATININE-BSD FMLA CKD-EPI: >60 MLS/MIN/1.73/M2
GLOBULIN SER-MCNC: 0.9 GM/DL (ref 2.4–3.5)
GLOBULIN SER-MCNC: 1.3 GM/DL (ref 2.4–3.5)
GLOBULIN SER-MCNC: 1.6 GM/DL (ref 2.4–3.5)
GLOBULIN SER-MCNC: 1.8 GM/DL (ref 2.4–3.5)
GLOBULIN SER-MCNC: 1.9 GM/DL (ref 2.4–3.5)
GLOBULIN SER-MCNC: 2.3 GM/DL (ref 2.4–3.5)
GLOBULIN SER-MCNC: 2.3 GM/DL (ref 2.4–3.5)
GLOBULIN SER-MCNC: 2.7 GM/DL (ref 2.4–3.5)
GLOBULIN SER-MCNC: 2.7 GM/DL (ref 2.4–3.5)
GLOBULIN SER-MCNC: 2.8 GM/DL (ref 2.4–3.5)
GLOBULIN SER-MCNC: 2.9 GM/DL (ref 2.4–3.5)
GLOBULIN SER-MCNC: 3 GM/DL (ref 2.4–3.5)
GLOBULIN SER-MCNC: 3.1 GM/DL (ref 2.4–3.5)
GLOBULIN SER-MCNC: 3.2 GM/DL (ref 2.4–3.5)
GLOBULIN SER-MCNC: 3.3 GM/DL (ref 2.4–3.5)
GLOBULIN SER-MCNC: 3.4 GM/DL (ref 2.4–3.5)
GLUCOSE SERPL-MCNC: 101 MG/DL (ref 82–115)
GLUCOSE SERPL-MCNC: 103 MG/DL (ref 82–115)
GLUCOSE SERPL-MCNC: 111 MG/DL (ref 82–115)
GLUCOSE SERPL-MCNC: 112 MG/DL (ref 82–115)
GLUCOSE SERPL-MCNC: 112 MG/DL (ref 82–115)
GLUCOSE SERPL-MCNC: 119 MG/DL (ref 82–115)
GLUCOSE SERPL-MCNC: 119 MG/DL (ref 82–115)
GLUCOSE SERPL-MCNC: 120 MG/DL (ref 82–115)
GLUCOSE SERPL-MCNC: 123 MG/DL (ref 82–115)
GLUCOSE SERPL-MCNC: 124 MG/DL (ref 82–115)
GLUCOSE SERPL-MCNC: 135 MG/DL (ref 82–115)
GLUCOSE SERPL-MCNC: 140 MG/DL (ref 82–115)
GLUCOSE SERPL-MCNC: 152 MG/DL (ref 82–115)
GLUCOSE SERPL-MCNC: 179 MG/DL (ref 82–115)
GLUCOSE SERPL-MCNC: 67 MG/DL (ref 82–115)
GLUCOSE SERPL-MCNC: 75 MG/DL (ref 82–115)
GLUCOSE SERPL-MCNC: 77 MG/DL (ref 82–115)
GLUCOSE SERPL-MCNC: 78 MG/DL (ref 82–115)
GLUCOSE SERPL-MCNC: 80 MG/DL (ref 82–115)
GLUCOSE SERPL-MCNC: 82 MG/DL (ref 82–115)
GLUCOSE SERPL-MCNC: 84 MG/DL (ref 82–115)
GLUCOSE SERPL-MCNC: 85 MG/DL (ref 82–115)
GLUCOSE SERPL-MCNC: 86 MG/DL (ref 82–115)
GLUCOSE SERPL-MCNC: 91 MG/DL (ref 82–115)
GLUCOSE SERPL-MCNC: 93 MG/DL (ref 82–115)
GLUCOSE SERPL-MCNC: 94 MG/DL (ref 82–115)
GLUCOSE SERPL-MCNC: 99 MG/DL (ref 82–115)
GLUCOSE UR QL STRIP.AUTO: NEGATIVE
GLUCOSE UR QL STRIP.AUTO: NEGATIVE
GLUCOSE UR QL STRIP.AUTO: NEGATIVE MG/DL
GROUP & RH: NORMAL
HAV AB SER QL IA: NONREACTIVE
HAV IGM SERPL QL IA: NONREACTIVE
HBV CORE AB SERPL QL IA: NONREACTIVE
HBV SURFACE AB SER-ACNC: 0.11 MIU/ML
HBV SURFACE AB SERPL IA-ACNC: NONREACTIVE M[IU]/ML
HBV SURFACE AG SERPL QL IA: NONREACTIVE
HCT VFR BLD AUTO: 25.7 % (ref 42–52)
HCT VFR BLD AUTO: 31.3 % (ref 42–52)
HCT VFR BLD AUTO: 32.5 % (ref 42–52)
HCT VFR BLD AUTO: 32.9 % (ref 42–52)
HCT VFR BLD AUTO: 33.2 % (ref 42–52)
HCT VFR BLD AUTO: 33.4 % (ref 42–52)
HCT VFR BLD AUTO: 33.9 % (ref 42–52)
HCT VFR BLD AUTO: 34.6 % (ref 42–52)
HCT VFR BLD AUTO: 35.1 % (ref 42–52)
HCT VFR BLD AUTO: 35.7 % (ref 42–52)
HCT VFR BLD AUTO: 35.7 % (ref 42–52)
HCT VFR BLD AUTO: 35.8 % (ref 42–52)
HCT VFR BLD AUTO: 36 % (ref 42–52)
HCT VFR BLD AUTO: 36 % (ref 42–52)
HCT VFR BLD AUTO: 36.6 % (ref 42–52)
HCT VFR BLD AUTO: 36.6 % (ref 42–52)
HCT VFR BLD AUTO: 37.5 % (ref 42–52)
HCT VFR BLD AUTO: 37.9 % (ref 42–52)
HCT VFR BLD AUTO: 38.4 % (ref 42–52)
HCT VFR BLD AUTO: 39.2 % (ref 42–52)
HCT VFR BLD AUTO: 40.3 % (ref 42–52)
HCT VFR BLD AUTO: 40.7 % (ref 42–52)
HCT VFR BLD AUTO: 41.3 % (ref 42–52)
HCT VFR BLD AUTO: 41.8 % (ref 42–52)
HCT VFR BLD AUTO: 42.1 % (ref 42–52)
HCT VFR BLD AUTO: 43.5 % (ref 42–52)
HCT VFR BLD AUTO: 43.5 % (ref 42–52)
HCT VFR BLD AUTO: 45.4 % (ref 42–52)
HCV AB SERPL QL IA: NONREACTIVE
HDLC SERPL-MCNC: 55 MG/DL (ref 35–60)
HEMOCCULT SP1 STL QL: POSITIVE
HEMOCCULT SP2 STL QL: POSITIVE
HEMOCCULT SP3 STL QL: POSITIVE
HGB BLD-MCNC: 10.4 G/DL (ref 14–18)
HGB BLD-MCNC: 10.7 G/DL (ref 14–18)
HGB BLD-MCNC: 11 G/DL (ref 14–18)
HGB BLD-MCNC: 11 G/DL (ref 14–18)
HGB BLD-MCNC: 11.1 G/DL (ref 14–18)
HGB BLD-MCNC: 11.2 G/DL (ref 14–18)
HGB BLD-MCNC: 11.3 G/DL (ref 14–18)
HGB BLD-MCNC: 11.3 G/DL (ref 14–18)
HGB BLD-MCNC: 11.6 G/DL (ref 14–18)
HGB BLD-MCNC: 11.7 G/DL (ref 14–18)
HGB BLD-MCNC: 11.7 G/DL (ref 14–18)
HGB BLD-MCNC: 11.8 G/DL (ref 14–18)
HGB BLD-MCNC: 11.9 G/DL (ref 14–18)
HGB BLD-MCNC: 12 G/DL (ref 14–18)
HGB BLD-MCNC: 12 G/DL (ref 14–18)
HGB BLD-MCNC: 12.1 G/DL (ref 14–18)
HGB BLD-MCNC: 12.4 G/DL (ref 14–18)
HGB BLD-MCNC: 12.6 G/DL (ref 14–18)
HGB BLD-MCNC: 12.9 G/DL (ref 14–18)
HGB BLD-MCNC: 13.2 G/DL (ref 14–18)
HGB BLD-MCNC: 13.2 G/DL (ref 14–18)
HGB BLD-MCNC: 13.5 G/DL (ref 14–18)
HGB BLD-MCNC: 13.5 G/DL (ref 14–18)
HGB BLD-MCNC: 13.9 G/DL (ref 14–18)
HGB BLD-MCNC: 14.3 G/DL (ref 14–18)
HGB BLD-MCNC: 14.5 G/DL (ref 14–18)
HGB BLD-MCNC: 14.8 G/DL (ref 14–18)
HGB BLD-MCNC: 8.4 G/DL (ref 14–18)
HIV 1+2 AB+HIV1 P24 AG SERPL QL IA: NONREACTIVE
HYPOCHROMIA BLD QL SMEAR: SLIGHT
IMM GRANULOCYTES # BLD AUTO: 0.07 X10(3)/MCL (ref 0–0.04)
IMM GRANULOCYTES # BLD AUTO: 0.07 X10(3)/MCL (ref 0–0.04)
IMM GRANULOCYTES # BLD AUTO: 0.08 X10(3)/MCL (ref 0–0.04)
IMM GRANULOCYTES # BLD AUTO: 0.16 X10(3)/MCL (ref 0–0.04)
IMM GRANULOCYTES # BLD AUTO: 0.16 X10(3)/MCL (ref 0–0.04)
IMM GRANULOCYTES # BLD AUTO: 0.18 X10(3)/MCL (ref 0–0.04)
IMM GRANULOCYTES # BLD AUTO: 0.21 X10(3)/MCL (ref 0–0.04)
IMM GRANULOCYTES # BLD AUTO: 0.21 X10(3)/MCL (ref 0–0.04)
IMM GRANULOCYTES # BLD AUTO: 0.22 X10(3)/MCL (ref 0–0.04)
IMM GRANULOCYTES # BLD AUTO: 0.22 X10(3)/MCL (ref 0–0.04)
IMM GRANULOCYTES # BLD AUTO: 0.23 X10(3)/MCL (ref 0–0.04)
IMM GRANULOCYTES # BLD AUTO: 0.25 X10(3)/MCL (ref 0–0.04)
IMM GRANULOCYTES # BLD AUTO: 0.26 X10(3)/MCL (ref 0–0.04)
IMM GRANULOCYTES # BLD AUTO: 0.26 X10(3)/MCL (ref 0–0.04)
IMM GRANULOCYTES # BLD AUTO: 0.4 X10(3)/MCL (ref 0–0.04)
IMM GRANULOCYTES # BLD AUTO: 0.46 X10(3)/MCL (ref 0–0.04)
IMM GRANULOCYTES # BLD AUTO: 0.46 X10(3)/MCL (ref 0–0.04)
IMM GRANULOCYTES # BLD AUTO: 0.47 X10(3)/MCL (ref 0–0.04)
IMM GRANULOCYTES NFR BLD AUTO: 0.5 %
IMM GRANULOCYTES NFR BLD AUTO: 0.7 %
IMM GRANULOCYTES NFR BLD AUTO: 0.8 %
IMM GRANULOCYTES NFR BLD AUTO: 1 %
IMM GRANULOCYTES NFR BLD AUTO: 1.5 %
IMM GRANULOCYTES NFR BLD AUTO: 1.8 %
IMM GRANULOCYTES NFR BLD AUTO: 1.8 %
IMM GRANULOCYTES NFR BLD AUTO: 2 %
IMM GRANULOCYTES NFR BLD AUTO: 2.1 %
IMM GRANULOCYTES NFR BLD AUTO: 2.1 %
IMM GRANULOCYTES NFR BLD AUTO: 2.4 %
IMM GRANULOCYTES NFR BLD AUTO: 2.7 %
IMM GRANULOCYTES NFR BLD AUTO: 3.1 %
IMM GRANULOCYTES NFR BLD AUTO: 3.2 %
IMM GRANULOCYTES NFR BLD AUTO: 4.1 %
IMM GRANULOCYTES NFR BLD AUTO: 4.5 %
IMM GRANULOCYTES NFR BLD AUTO: 4.9 %
IMM GRANULOCYTES NFR BLD AUTO: 5 %
INDIRECT COOMBS: NORMAL
INR PPP: 1 (ref 2–3)
INR PPP: 1.1
INR PPP: 1.2
INR PPP: 1.2
INTERVENTRICULAR SEPTUM: 0.49 CM (ref 0.6–1.1)
INTERVENTRICULAR SEPTUM: 0.98 CM (ref 0.6–1.1)
KETONES UR QL STRIP.AUTO: NEGATIVE
KETONES UR QL STRIP.AUTO: NEGATIVE
KETONES UR QL STRIP.AUTO: NEGATIVE MG/DL
LACTATE SERPL-SCNC: 0.9 MMOL/L (ref 0.5–2.2)
LACTATE SERPL-SCNC: 1.2 MMOL/L (ref 0.5–2.2)
LACTATE SERPL-SCNC: 1.6 MMOL/L (ref 0.5–2.2)
LACTATE SERPL-SCNC: 2.3 MMOL/L (ref 0.5–2.2)
LACTATE SERPL-SCNC: 2.4 MMOL/L (ref 0.5–2.2)
LACTATE SERPL-SCNC: 2.8 MMOL/L (ref 0.5–2.2)
LACTATE SERPL-SCNC: 3.4 MMOL/L (ref 0.5–2.2)
LACTATE SERPL-SCNC: 4.8 MMOL/L (ref 0.5–2.2)
LACTATE SERPL-SCNC: 5.8 MMOL/L (ref 0.5–2.2)
LACTATE SERPL-SCNC: 6 MMOL/L (ref 0.5–2.2)
LDLC SERPL CALC-MCNC: 85 MG/DL (ref 50–140)
LEFT ATRIUM SIZE: 3.23 CM
LEFT INTERNAL DIMENSION IN SYSTOLE: 3.22 CM (ref 2.1–4)
LEFT INTERNAL DIMENSION IN SYSTOLE: 4.36 CM (ref 2.1–4)
LEFT VENTRICLE DIASTOLIC VOLUME INDEX: 61.71 ML/M2
LEFT VENTRICLE DIASTOLIC VOLUME INDEX: 61.79 ML/M2
LEFT VENTRICLE DIASTOLIC VOLUME: 109.84 ML
LEFT VENTRICLE DIASTOLIC VOLUME: 114.31 ML
LEFT VENTRICLE MASS INDEX: 84 G/M2
LEFT VENTRICLE MASS INDEX: 96 G/M2
LEFT VENTRICLE SYSTOLIC VOLUME INDEX: 23.3 ML/M2
LEFT VENTRICLE SYSTOLIC VOLUME INDEX: 46.4 ML/M2
LEFT VENTRICLE SYSTOLIC VOLUME: 41.53 ML
LEFT VENTRICLE SYSTOLIC VOLUME: 85.78 ML
LEFT VENTRICULAR INTERNAL DIMENSION IN DIASTOLE: 4.84 CM (ref 3.5–6)
LEFT VENTRICULAR INTERNAL DIMENSION IN DIASTOLE: 4.93 CM (ref 3.5–6)
LEFT VENTRICULAR MASS: 154.52 G
LEFT VENTRICULAR MASS: 171.35 G
LEUKOCYTE ESTERASE UR QL STRIP.AUTO: ABNORMAL
LEUKOCYTE ESTERASE UR QL STRIP.AUTO: NEGATIVE
LEUKOCYTE ESTERASE UR QL STRIP.AUTO: NEGATIVE UNIT/L
LIPASE SERPL-CCNC: 19 U/L
LIPASE SERPL-CCNC: 21 U/L
LVOT MG: 2.47 MMHG
LVOT MV: 0.76 CM/S
LYMPH ABN # BLD MANUAL: 7 %
LYMPHOCYTES # BLD AUTO: 0.3 X10(3)/MCL (ref 0.6–4.6)
LYMPHOCYTES # BLD AUTO: 0.42 X10(3)/MCL (ref 0.6–4.6)
LYMPHOCYTES # BLD AUTO: 0.44 X10(3)/MCL (ref 0.6–4.6)
LYMPHOCYTES # BLD AUTO: 0.54 X10(3)/MCL (ref 0.6–4.6)
LYMPHOCYTES # BLD AUTO: 0.57 X10(3)/MCL (ref 0.6–4.6)
LYMPHOCYTES # BLD AUTO: 0.62 X10(3)/MCL (ref 0.6–4.6)
LYMPHOCYTES # BLD AUTO: 0.63 X10(3)/MCL (ref 0.6–4.6)
LYMPHOCYTES # BLD AUTO: 0.65 X10(3)/MCL (ref 0.6–4.6)
LYMPHOCYTES # BLD AUTO: 0.65 X10(3)/MCL (ref 0.6–4.6)
LYMPHOCYTES # BLD AUTO: 0.71 X10(3)/MCL (ref 0.6–4.6)
LYMPHOCYTES # BLD AUTO: 0.74 X10(3)/MCL (ref 0.6–4.6)
LYMPHOCYTES # BLD AUTO: 0.84 X10(3)/MCL (ref 0.6–4.6)
LYMPHOCYTES # BLD AUTO: 0.94 X10(3)/MCL (ref 0.6–4.6)
LYMPHOCYTES # BLD AUTO: 0.96 X10(3)/MCL (ref 0.6–4.6)
LYMPHOCYTES # BLD AUTO: 0.96 X10(3)/MCL (ref 0.6–4.6)
LYMPHOCYTES # BLD AUTO: 0.97 X10(3)/MCL (ref 0.6–4.6)
LYMPHOCYTES # BLD AUTO: 1.06 X10(3)/MCL (ref 0.6–4.6)
LYMPHOCYTES # BLD AUTO: 1.44 X10(3)/MCL (ref 0.6–4.6)
LYMPHOCYTES NFR BLD AUTO: 1.5 %
LYMPHOCYTES NFR BLD AUTO: 13.7 %
LYMPHOCYTES NFR BLD AUTO: 14.1 %
LYMPHOCYTES NFR BLD AUTO: 15.2 %
LYMPHOCYTES NFR BLD AUTO: 17.2 %
LYMPHOCYTES NFR BLD AUTO: 19 %
LYMPHOCYTES NFR BLD AUTO: 2.2 %
LYMPHOCYTES NFR BLD AUTO: 2.3 %
LYMPHOCYTES NFR BLD AUTO: 20.6 %
LYMPHOCYTES NFR BLD AUTO: 3.1 %
LYMPHOCYTES NFR BLD AUTO: 3.8 %
LYMPHOCYTES NFR BLD AUTO: 4.3 %
LYMPHOCYTES NFR BLD AUTO: 4.7 %
LYMPHOCYTES NFR BLD AUTO: 6.6 %
LYMPHOCYTES NFR BLD AUTO: 6.7 %
LYMPHOCYTES NFR BLD AUTO: 8.3 %
LYMPHOCYTES NFR BLD AUTO: 8.3 %
LYMPHOCYTES NFR BLD AUTO: 8.4 %
LYMPHOCYTES NFR BLD MANUAL: 0.44 X10(3)/MCL
LYMPHOCYTES NFR BLD MANUAL: 1.13 X10(3)/MCL
LYMPHOCYTES NFR BLD MANUAL: 1.2 X10(3)/MCL
LYMPHOCYTES NFR BLD MANUAL: 1.25 X10(3)/MCL
LYMPHOCYTES NFR BLD MANUAL: 1.5 X10(3)/MCL
LYMPHOCYTES NFR BLD MANUAL: 1.85 X10(3)/MCL
LYMPHOCYTES NFR BLD MANUAL: 11 % (ref 13–40)
LYMPHOCYTES NFR BLD MANUAL: 11 % (ref 13–40)
LYMPHOCYTES NFR BLD MANUAL: 16 % (ref 13–40)
LYMPHOCYTES NFR BLD MANUAL: 2 % (ref 13–40)
LYMPHOCYTES NFR BLD MANUAL: 2.04 X10(3)/MCL
LYMPHOCYTES NFR BLD MANUAL: 2.45 X10(3)/MCL
LYMPHOCYTES NFR BLD MANUAL: 2.97 X10(3)/MCL
LYMPHOCYTES NFR BLD MANUAL: 20 % (ref 13–40)
LYMPHOCYTES NFR BLD MANUAL: 25 % (ref 13–40)
LYMPHOCYTES NFR BLD MANUAL: 32 % (ref 13–40)
LYMPHOCYTES NFR BLD MANUAL: 48 % (ref 13–40)
LYMPHOCYTES NFR BLD MANUAL: 5 % (ref 13–40)
M TB IFN-G BLD-IMP: NEGATIVE
M TB IFN-G CD4+ BCKGRND COR BLD-ACNC: 0.15 IU/ML
M TB IFN-G CD4+CD8+ BCKGRND COR BLD-ACNC: 0.15 IU/ML
MAGNESIUM SERPL-MCNC: 1.6 MG/DL (ref 1.6–2.6)
MAGNESIUM SERPL-MCNC: 1.6 MG/DL (ref 1.6–2.6)
MAGNESIUM SERPL-MCNC: 1.7 MG/DL (ref 1.6–2.6)
MAGNESIUM SERPL-MCNC: 1.7 MG/DL (ref 1.6–2.6)
MAGNESIUM SERPL-MCNC: 1.8 MG/DL (ref 1.6–2.6)
MAGNESIUM SERPL-MCNC: 1.9 MG/DL (ref 1.6–2.6)
MAGNESIUM SERPL-MCNC: 1.9 MG/DL (ref 1.6–2.6)
MAGNESIUM SERPL-MCNC: 2 MG/DL (ref 1.6–2.6)
MCH RBC QN AUTO: 28 PG (ref 27–31)
MCH RBC QN AUTO: 28.1 PG (ref 27–31)
MCH RBC QN AUTO: 28.2 PG (ref 27–31)
MCH RBC QN AUTO: 28.3 PG (ref 27–31)
MCH RBC QN AUTO: 28.4 PG (ref 27–31)
MCH RBC QN AUTO: 28.5 PG (ref 27–31)
MCH RBC QN AUTO: 28.6 PG (ref 27–31)
MCH RBC QN AUTO: 28.7 PG (ref 27–31)
MCH RBC QN AUTO: 28.8 PG (ref 27–31)
MCH RBC QN AUTO: 28.8 PG (ref 27–31)
MCH RBC QN AUTO: 28.9 PG (ref 27–31)
MCH RBC QN AUTO: 29 PG (ref 27–31)
MCH RBC QN AUTO: 29.1 PG (ref 27–31)
MCH RBC QN AUTO: 29.8 PG (ref 27–31)
MCHC RBC AUTO-ENTMCNC: 31.3 G/DL (ref 33–36)
MCHC RBC AUTO-ENTMCNC: 31.6 G/DL (ref 33–36)
MCHC RBC AUTO-ENTMCNC: 31.7 G/DL (ref 33–36)
MCHC RBC AUTO-ENTMCNC: 31.9 G/DL (ref 33–36)
MCHC RBC AUTO-ENTMCNC: 32 G/DL (ref 33–36)
MCHC RBC AUTO-ENTMCNC: 32.1 G/DL (ref 33–36)
MCHC RBC AUTO-ENTMCNC: 32.2 G/DL (ref 33–36)
MCHC RBC AUTO-ENTMCNC: 32.4 G/DL (ref 33–36)
MCHC RBC AUTO-ENTMCNC: 32.5 G/DL (ref 33–36)
MCHC RBC AUTO-ENTMCNC: 32.5 G/DL (ref 33–36)
MCHC RBC AUTO-ENTMCNC: 32.7 G/DL (ref 33–36)
MCHC RBC AUTO-ENTMCNC: 32.7 G/DL (ref 33–36)
MCHC RBC AUTO-ENTMCNC: 32.9 G/DL (ref 33–36)
MCHC RBC AUTO-ENTMCNC: 32.9 G/DL (ref 33–36)
MCHC RBC AUTO-ENTMCNC: 33 G/DL (ref 33–36)
MCHC RBC AUTO-ENTMCNC: 33.1 G/DL (ref 33–36)
MCHC RBC AUTO-ENTMCNC: 33.2 G/DL (ref 33–36)
MCHC RBC AUTO-ENTMCNC: 33.2 G/DL (ref 33–36)
MCHC RBC AUTO-ENTMCNC: 33.3 G/DL (ref 33–36)
MCHC RBC AUTO-ENTMCNC: 33.3 G/DL (ref 33–36)
MCHC RBC AUTO-ENTMCNC: 33.4 G/DL (ref 33–36)
MCHC RBC AUTO-ENTMCNC: 33.5 G/DL (ref 33–36)
MCHC RBC AUTO-ENTMCNC: 33.5 G/DL (ref 33–36)
MCHC RBC AUTO-ENTMCNC: 33.6 G/DL (ref 33–36)
MCHC RBC AUTO-ENTMCNC: 34 G/DL (ref 33–36)
MCHC RBC AUTO-ENTMCNC: 34 G/DL (ref 33–36)
MCV RBC AUTO: 83.3 FL (ref 80–94)
MCV RBC AUTO: 84.6 FL (ref 80–94)
MCV RBC AUTO: 84.8 FL (ref 80–94)
MCV RBC AUTO: 84.8 FL (ref 80–94)
MCV RBC AUTO: 85 FL (ref 80–94)
MCV RBC AUTO: 85 FL (ref 80–94)
MCV RBC AUTO: 85.3 FL (ref 80–94)
MCV RBC AUTO: 85.3 FL (ref 80–94)
MCV RBC AUTO: 85.4 FL (ref 80–94)
MCV RBC AUTO: 85.6 FL (ref 80–94)
MCV RBC AUTO: 85.7 FL (ref 80–94)
MCV RBC AUTO: 85.7 FL (ref 80–94)
MCV RBC AUTO: 85.9 FL (ref 80–94)
MCV RBC AUTO: 86.3 FL (ref 80–94)
MCV RBC AUTO: 86.3 FL (ref 80–94)
MCV RBC AUTO: 86.5 FL (ref 80–94)
MCV RBC AUTO: 87.2 FL (ref 80–94)
MCV RBC AUTO: 87.7 FL (ref 80–94)
MCV RBC AUTO: 87.7 FL (ref 80–94)
MCV RBC AUTO: 87.9 FL (ref 80–94)
MCV RBC AUTO: 88.2 FL (ref 80–94)
MCV RBC AUTO: 88.7 FL (ref 80–94)
MCV RBC AUTO: 88.9 FL (ref 80–94)
MCV RBC AUTO: 88.9 FL (ref 80–94)
MCV RBC AUTO: 89.7 FL (ref 80–94)
MCV RBC AUTO: 90 FL (ref 80–94)
MCV RBC AUTO: 90.6 FL (ref 80–94)
MCV RBC AUTO: 91.2 FL (ref 80–94)
METAMYELOCYTES NFR BLD MANUAL: 1 %
METAMYELOCYTES NFR BLD MANUAL: 3 %
MITOGEN IGNF BCKGRD COR BLD-ACNC: 7.66 IU/ML
MONOCYTES # BLD AUTO: 0.28 X10(3)/MCL (ref 0.1–1.3)
MONOCYTES # BLD AUTO: 0.3 X10(3)/MCL (ref 0.1–1.3)
MONOCYTES # BLD AUTO: 0.36 X10(3)/MCL (ref 0.1–1.3)
MONOCYTES # BLD AUTO: 0.37 X10(3)/MCL (ref 0.1–1.3)
MONOCYTES # BLD AUTO: 0.39 X10(3)/MCL (ref 0.1–1.3)
MONOCYTES # BLD AUTO: 0.46 X10(3)/MCL (ref 0.1–1.3)
MONOCYTES # BLD AUTO: 0.53 X10(3)/MCL (ref 0.1–1.3)
MONOCYTES # BLD AUTO: 0.57 X10(3)/MCL (ref 0.1–1.3)
MONOCYTES # BLD AUTO: 0.6 X10(3)/MCL (ref 0.1–1.3)
MONOCYTES # BLD AUTO: 0.64 X10(3)/MCL (ref 0.1–1.3)
MONOCYTES # BLD AUTO: 0.65 X10(3)/MCL (ref 0.1–1.3)
MONOCYTES # BLD AUTO: 0.68 X10(3)/MCL (ref 0.1–1.3)
MONOCYTES # BLD AUTO: 0.76 X10(3)/MCL (ref 0.1–1.3)
MONOCYTES # BLD AUTO: 0.79 X10(3)/MCL (ref 0.1–1.3)
MONOCYTES # BLD AUTO: 0.89 X10(3)/MCL (ref 0.1–1.3)
MONOCYTES # BLD AUTO: 1.49 X10(3)/MCL (ref 0.1–1.3)
MONOCYTES NFR BLD AUTO: 2 %
MONOCYTES NFR BLD AUTO: 3.2 %
MONOCYTES NFR BLD AUTO: 4.6 %
MONOCYTES NFR BLD AUTO: 4.6 %
MONOCYTES NFR BLD AUTO: 4.7 %
MONOCYTES NFR BLD AUTO: 4.8 %
MONOCYTES NFR BLD AUTO: 4.9 %
MONOCYTES NFR BLD AUTO: 5.2 %
MONOCYTES NFR BLD AUTO: 5.2 %
MONOCYTES NFR BLD AUTO: 5.4 %
MONOCYTES NFR BLD AUTO: 5.5 %
MONOCYTES NFR BLD AUTO: 5.8 %
MONOCYTES NFR BLD AUTO: 5.9 %
MONOCYTES NFR BLD AUTO: 6.1 %
MONOCYTES NFR BLD AUTO: 6.4 %
MONOCYTES NFR BLD AUTO: 7.3 %
MONOCYTES NFR BLD AUTO: 7.3 %
MONOCYTES NFR BLD AUTO: 7.5 %
MONOCYTES NFR BLD MANUAL: 0.23 X10(3)/MCL (ref 0.1–1.3)
MONOCYTES NFR BLD MANUAL: 0.23 X10(3)/MCL (ref 0.1–1.3)
MONOCYTES NFR BLD MANUAL: 0.29 X10(3)/MCL (ref 0.1–1.3)
MONOCYTES NFR BLD MANUAL: 0.48 X10(3)/MCL (ref 0.1–1.3)
MONOCYTES NFR BLD MANUAL: 0.51 X10(3)/MCL (ref 0.1–1.3)
MONOCYTES NFR BLD MANUAL: 0.59 X10(3)/MCL (ref 0.1–1.3)
MONOCYTES NFR BLD MANUAL: 0.68 X10(3)/MCL (ref 0.1–1.3)
MONOCYTES NFR BLD MANUAL: 0.72 X10(3)/MCL (ref 0.1–1.3)
MONOCYTES NFR BLD MANUAL: 1.09 X10(3)/MCL (ref 0.1–1.3)
MONOCYTES NFR BLD MANUAL: 1.59 X10(3)/MCL (ref 0.1–1.3)
MONOCYTES NFR BLD MANUAL: 2 % (ref 2–11)
MONOCYTES NFR BLD MANUAL: 3 % (ref 2–11)
MONOCYTES NFR BLD MANUAL: 4 % (ref 2–11)
MONOCYTES NFR BLD MANUAL: 4 % (ref 2–11)
MONOCYTES NFR BLD MANUAL: 5 % (ref 2–11)
MONOCYTES NFR BLD MANUAL: 5 % (ref 2–11)
MONOCYTES NFR BLD MANUAL: 6 % (ref 2–11)
MONOCYTES NFR BLD MANUAL: 6 % (ref 2–11)
MONOCYTES NFR BLD MANUAL: 7 % (ref 2–11)
MONOCYTES NFR BLD MANUAL: 7 % (ref 2–11)
MUCOUS THREADS URNS QL MICRO: ABNORMAL /LPF
MUCOUS THREADS URNS QL MICRO: ABNORMAL /LPF
MV PEAK A VEL: 0.02 M/S
MV PEAK A VEL: 0.8 M/S
MV PEAK E VEL: 0.68 M/S
MV PEAK E VEL: 1.24 M/S
MV STENOSIS PRESSURE HALF TIME: 48.9 MS
MV STENOSIS PRESSURE HALF TIME: 79.09 MS
MV VALVE AREA P 1/2 METHOD: 2.78 CM2
MV VALVE AREA P 1/2 METHOD: 4.5 CM2
MYELOCYTES NFR BLD MANUAL: 1 %
MYELOCYTES NFR BLD MANUAL: 3 %
NEUTROPHILS # BLD AUTO: 10.75 X10(3)/MCL (ref 2.1–9.2)
NEUTROPHILS # BLD AUTO: 12.82 X10(3)/MCL (ref 2.1–9.2)
NEUTROPHILS # BLD AUTO: 12.91 X10(3)/MCL (ref 2.1–9.2)
NEUTROPHILS # BLD AUTO: 17.35 X10(3)/MCL (ref 2.1–9.2)
NEUTROPHILS # BLD AUTO: 17.7 X10(3)/MCL (ref 2.1–9.2)
NEUTROPHILS # BLD AUTO: 17.87 X10(3)/MCL (ref 2.1–9.2)
NEUTROPHILS # BLD AUTO: 18.93 X10(3)/MCL (ref 2.1–9.2)
NEUTROPHILS # BLD AUTO: 3.14 X10(3)/MCL (ref 2.1–9.2)
NEUTROPHILS # BLD AUTO: 3.31 X10(3)/MCL (ref 2.1–9.2)
NEUTROPHILS # BLD AUTO: 4.1 X10(3)/MCL (ref 2.1–9.2)
NEUTROPHILS # BLD AUTO: 4.62 X10(3)/MCL (ref 2.1–9.2)
NEUTROPHILS # BLD AUTO: 6.13 X10(3)/MCL (ref 2.1–9.2)
NEUTROPHILS # BLD AUTO: 6.2 X10(3)/MCL (ref 2.1–9.2)
NEUTROPHILS # BLD AUTO: 6.24 X10(3)/MCL (ref 2.1–9.2)
NEUTROPHILS # BLD AUTO: 8.23 X10(3)/MCL (ref 2.1–9.2)
NEUTROPHILS # BLD AUTO: 8.62 X10(3)/MCL (ref 2.1–9.2)
NEUTROPHILS # BLD AUTO: 8.88 X10(3)/MCL (ref 2.1–9.2)
NEUTROPHILS # BLD AUTO: 9.58 X10(3)/MCL (ref 2.1–9.2)
NEUTROPHILS NFR BLD AUTO: 66.6 %
NEUTROPHILS NFR BLD AUTO: 66.7 %
NEUTROPHILS NFR BLD AUTO: 73 %
NEUTROPHILS NFR BLD AUTO: 74.5 %
NEUTROPHILS NFR BLD AUTO: 78.4 %
NEUTROPHILS NFR BLD AUTO: 78.9 %
NEUTROPHILS NFR BLD AUTO: 79.1 %
NEUTROPHILS NFR BLD AUTO: 81.2 %
NEUTROPHILS NFR BLD AUTO: 83.4 %
NEUTROPHILS NFR BLD AUTO: 84.3 %
NEUTROPHILS NFR BLD AUTO: 87 %
NEUTROPHILS NFR BLD AUTO: 87.1 %
NEUTROPHILS NFR BLD AUTO: 87.7 %
NEUTROPHILS NFR BLD AUTO: 88.4 %
NEUTROPHILS NFR BLD AUTO: 90.1 %
NEUTROPHILS NFR BLD AUTO: 90.5 %
NEUTROPHILS NFR BLD AUTO: 93 %
NEUTROPHILS NFR BLD AUTO: 94.4 %
NEUTROPHILS NFR BLD MANUAL: 41 % (ref 47–80)
NEUTROPHILS NFR BLD MANUAL: 62 % (ref 47–80)
NEUTROPHILS NFR BLD MANUAL: 65 % (ref 47–80)
NEUTROPHILS NFR BLD MANUAL: 69 % (ref 47–80)
NEUTROPHILS NFR BLD MANUAL: 74 % (ref 47–80)
NEUTROPHILS NFR BLD MANUAL: 74 % (ref 47–80)
NEUTROPHILS NFR BLD MANUAL: 81 % (ref 47–80)
NEUTROPHILS NFR BLD MANUAL: 86 % (ref 47–80)
NEUTROPHILS NFR BLD MANUAL: 89 % (ref 47–80)
NEUTROPHILS NFR BLD MANUAL: 93 % (ref 47–80)
NEUTS BAND NFR BLD MANUAL: 1 % (ref 0–11)
NEUTS BAND NFR BLD MANUAL: 1 % (ref 0–11)
NEUTS BAND NFR BLD MANUAL: 2 % (ref 0–11)
NEUTS BAND NFR BLD MANUAL: 3 % (ref 0–11)
NEUTS BAND NFR BLD MANUAL: 3 % (ref 0–11)
NEUTS BAND NFR BLD MANUAL: 5 % (ref 0–11)
NITRITE UR QL STRIP.AUTO: NEGATIVE
NITRITE UR QL STRIP.AUTO: POSITIVE
NOROVIRUS GI+II RNA STL QL NAA+NON-PROBE: NOT DETECTED
NOROVIRUS GI+II RNA STL QL NAA+NON-PROBE: NOT DETECTED
NRBC BLD AUTO-RTO: 0 %
OHS LV EJECTION FRACTION SIMPSONS BIPLANE MOD: 62 %
P SHIGELLOIDES DNA STL QL NAA+NON-PROBE: NOT DETECTED
P SHIGELLOIDES DNA STL QL NAA+NON-PROBE: NOT DETECTED
PH UR STRIP.AUTO: 5.5 [PH]
PH UR STRIP.AUTO: 5.5 [PH]
PH UR STRIP.AUTO: 6 [PH]
PHOSPHATE SERPL-MCNC: 1.8 MG/DL (ref 2.3–4.7)
PHOSPHATE SERPL-MCNC: 2.4 MG/DL (ref 2.3–4.7)
PHOSPHATE SERPL-MCNC: 3.5 MG/DL (ref 2.3–4.7)
PHOSPHATE SERPL-MCNC: 3.8 MG/DL (ref 2.3–4.7)
PISA MRMAX VEL: 1.97 M/S
PISA MRMAX VEL: 2.7 M/S
PISA TR MAX VEL: 1.9 M/S
PISA TR MAX VEL: 2.13 M/S
PLATELET # BLD AUTO: 143 X10(3)/MCL (ref 130–400)
PLATELET # BLD AUTO: 156 X10(3)/MCL (ref 130–400)
PLATELET # BLD AUTO: 156 X10(3)/MCL (ref 130–400)
PLATELET # BLD AUTO: 163 X10(3)/MCL (ref 130–400)
PLATELET # BLD AUTO: 165 X10(3)/MCL (ref 130–400)
PLATELET # BLD AUTO: 169 X10(3)/MCL (ref 130–400)
PLATELET # BLD AUTO: 174 X10(3)/MCL (ref 130–400)
PLATELET # BLD AUTO: 177 X10(3)/MCL (ref 130–400)
PLATELET # BLD AUTO: 184 X10(3)/MCL (ref 130–400)
PLATELET # BLD AUTO: 193 X10(3)/MCL (ref 130–400)
PLATELET # BLD AUTO: 195 X10(3)/MCL (ref 130–400)
PLATELET # BLD AUTO: 200 X10(3)/MCL (ref 130–400)
PLATELET # BLD AUTO: 221 X10(3)/MCL (ref 130–400)
PLATELET # BLD AUTO: 223 X10(3)/MCL (ref 130–400)
PLATELET # BLD AUTO: 247 X10(3)/MCL (ref 130–400)
PLATELET # BLD AUTO: 249 X10(3)/MCL (ref 130–400)
PLATELET # BLD AUTO: 249 X10(3)/MCL (ref 130–400)
PLATELET # BLD AUTO: 260 X10(3)/MCL (ref 130–400)
PLATELET # BLD AUTO: 262 X10(3)/MCL (ref 130–400)
PLATELET # BLD AUTO: 264 X10(3)/MCL (ref 130–400)
PLATELET # BLD AUTO: 269 X10(3)/MCL (ref 130–400)
PLATELET # BLD AUTO: 270 X10(3)/MCL (ref 130–400)
PLATELET # BLD AUTO: 287 X10(3)/MCL (ref 130–400)
PLATELET # BLD AUTO: 303 X10(3)/MCL (ref 130–400)
PLATELET # BLD AUTO: 316 X10(3)/MCL (ref 130–400)
PLATELET # BLD AUTO: 346 X10(3)/MCL (ref 130–400)
PLATELET # BLD AUTO: 397 X10(3)/MCL (ref 130–400)
PLATELET # BLD AUTO: 442 X10(3)/MCL (ref 130–400)
PLATELET # BLD EST: ADEQUATE 10*3/UL
PMV BLD AUTO: 10 FL (ref 7.4–10.4)
PMV BLD AUTO: 10.1 FL (ref 7.4–10.4)
PMV BLD AUTO: 10.3 FL (ref 7.4–10.4)
PMV BLD AUTO: 10.3 FL (ref 7.4–10.4)
PMV BLD AUTO: 10.5 FL (ref 7.4–10.4)
PMV BLD AUTO: 10.7 FL (ref 7.4–10.4)
PMV BLD AUTO: 10.7 FL (ref 7.4–10.4)
PMV BLD AUTO: 11.6 FL (ref 7.4–10.4)
PMV BLD AUTO: 8.6 FL (ref 7.4–10.4)
PMV BLD AUTO: 8.7 FL (ref 7.4–10.4)
PMV BLD AUTO: 8.8 FL (ref 7.4–10.4)
PMV BLD AUTO: 8.9 FL (ref 7.4–10.4)
PMV BLD AUTO: 9 FL (ref 7.4–10.4)
PMV BLD AUTO: 9.1 FL (ref 7.4–10.4)
PMV BLD AUTO: 9.1 FL (ref 7.4–10.4)
PMV BLD AUTO: 9.2 FL (ref 7.4–10.4)
PMV BLD AUTO: 9.3 FL (ref 7.4–10.4)
PMV BLD AUTO: 9.4 FL (ref 7.4–10.4)
PMV BLD AUTO: 9.4 FL (ref 7.4–10.4)
PMV BLD AUTO: 9.5 FL (ref 7.4–10.4)
PMV BLD AUTO: 9.5 FL (ref 7.4–10.4)
PMV BLD AUTO: 9.6 FL (ref 7.4–10.4)
PMV BLD AUTO: 9.8 FL (ref 7.4–10.4)
PMV BLD AUTO: 9.9 FL (ref 7.4–10.4)
POCT GLUCOSE: 135 MG/DL (ref 70–110)
POTASSIUM SERPL-SCNC: 2.7 MMOL/L (ref 3.5–5.1)
POTASSIUM SERPL-SCNC: 3 MMOL/L (ref 3.5–5.1)
POTASSIUM SERPL-SCNC: 3 MMOL/L (ref 3.5–5.1)
POTASSIUM SERPL-SCNC: 3.1 MMOL/L (ref 3.5–5.1)
POTASSIUM SERPL-SCNC: 3.1 MMOL/L (ref 3.5–5.1)
POTASSIUM SERPL-SCNC: 3.2 MMOL/L (ref 3.5–5.1)
POTASSIUM SERPL-SCNC: 3.2 MMOL/L (ref 3.5–5.1)
POTASSIUM SERPL-SCNC: 3.3 MMOL/L (ref 3.5–5.1)
POTASSIUM SERPL-SCNC: 3.3 MMOL/L (ref 3.5–5.1)
POTASSIUM SERPL-SCNC: 3.4 MMOL/L (ref 3.5–5.1)
POTASSIUM SERPL-SCNC: 3.5 MMOL/L (ref 3.5–5.1)
POTASSIUM SERPL-SCNC: 3.5 MMOL/L (ref 3.5–5.1)
POTASSIUM SERPL-SCNC: 3.6 MMOL/L (ref 3.5–5.1)
POTASSIUM SERPL-SCNC: 3.7 MMOL/L (ref 3.5–5.1)
POTASSIUM SERPL-SCNC: 3.8 MMOL/L (ref 3.5–5.1)
POTASSIUM SERPL-SCNC: 3.9 MMOL/L (ref 3.5–5.1)
POTASSIUM SERPL-SCNC: 3.9 MMOL/L (ref 3.5–5.1)
POTASSIUM SERPL-SCNC: 4.1 MMOL/L (ref 3.5–5.1)
POTASSIUM SERPL-SCNC: 4.3 MMOL/L (ref 3.5–5.1)
POTASSIUM SERPL-SCNC: 4.5 MMOL/L (ref 3.5–5.1)
POTASSIUM SERPL-SCNC: 4.6 MMOL/L (ref 3.5–5.1)
POTASSIUM SERPL-SCNC: 4.8 MMOL/L (ref 3.5–5.1)
POTASSIUM SERPL-SCNC: 4.8 MMOL/L (ref 3.5–5.1)
POTASSIUM SERPL-SCNC: 5.2 MMOL/L (ref 3.5–5.1)
PROT SERPL-MCNC: 3.4 GM/DL (ref 5.8–7.6)
PROT SERPL-MCNC: 3.5 GM/DL (ref 5.8–7.6)
PROT SERPL-MCNC: 3.5 GM/DL (ref 5.8–7.6)
PROT SERPL-MCNC: 3.6 GM/DL (ref 5.8–7.6)
PROT SERPL-MCNC: 3.6 GM/DL (ref 5.8–7.6)
PROT SERPL-MCNC: 3.7 GM/DL (ref 5.8–7.6)
PROT SERPL-MCNC: 3.9 GM/DL (ref 5.8–7.6)
PROT SERPL-MCNC: 4.7 GM/DL (ref 5.8–7.6)
PROT SERPL-MCNC: 4.7 GM/DL (ref 5.8–7.6)
PROT SERPL-MCNC: 4.9 GM/DL (ref 5.8–7.6)
PROT SERPL-MCNC: 4.9 GM/DL (ref 5.8–7.6)
PROT SERPL-MCNC: 5.1 GM/DL (ref 5.8–7.6)
PROT SERPL-MCNC: 5.1 GM/DL (ref 5.8–7.6)
PROT SERPL-MCNC: 5.3 GM/DL (ref 5.8–7.6)
PROT SERPL-MCNC: 5.4 GM/DL (ref 5.8–7.6)
PROT SERPL-MCNC: 5.8 GM/DL (ref 5.8–7.6)
PROT SERPL-MCNC: 5.8 GM/DL (ref 5.8–7.6)
PROT SERPL-MCNC: 5.9 GM/DL (ref 5.8–7.6)
PROT SERPL-MCNC: 6.9 GM/DL (ref 5.8–7.6)
PROT SERPL-MCNC: 7.5 GM/DL (ref 5.8–7.6)
PROT UR QL STRIP.AUTO: ABNORMAL
PROT UR QL STRIP.AUTO: ABNORMAL MG/DL
PROT UR QL STRIP.AUTO: ABNORMAL MG/DL
PROT UR QL STRIP.AUTO: NEGATIVE
PROT UR QL STRIP.AUTO: NEGATIVE MG/DL
PROTHROMBIN TIME: 10.8 SECONDS (ref 9.1–10.9)
PROTHROMBIN TIME: 12.7 SECONDS (ref 9.1–10.9)
PROTHROMBIN TIME: 12.8 SECONDS (ref 9.1–10.9)
PROTHROMBIN TIME: 13.7 SECONDS (ref 11.7–14.5)
PSYCHE PATHOLOGY RESULT: NORMAL
PV MV: 0.62 M/S
PV PEAK GRADIENT: 3 MMHG
PV PEAK VELOCITY: 0.83 M/S
PV PEAK VELOCITY: 0.85 CM/S
RA PRESSURE ESTIMATED: 3 MMHG
RBC # BLD AUTO: 2.97 X10(6)/MCL (ref 4.7–6.1)
RBC # BLD AUTO: 3.69 X10(6)/MCL (ref 4.7–6.1)
RBC # BLD AUTO: 3.81 X10(6)/MCL (ref 4.7–6.1)
RBC # BLD AUTO: 3.84 X10(6)/MCL (ref 4.7–6.1)
RBC # BLD AUTO: 3.89 X10(6)/MCL (ref 4.7–6.1)
RBC # BLD AUTO: 3.94 X10(6)/MCL (ref 4.7–6.1)
RBC # BLD AUTO: 3.99 X10(6)/MCL (ref 4.7–6.1)
RBC # BLD AUTO: 4 X10(6)/MCL (ref 4.7–6.1)
RBC # BLD AUTO: 4.06 X10(6)/MCL (ref 4.7–6.1)
RBC # BLD AUTO: 4.15 X10(6)/MCL (ref 4.7–6.1)
RBC # BLD AUTO: 4.19 X10(6)/MCL (ref 4.7–6.1)
RBC # BLD AUTO: 4.21 X10(6)/MCL (ref 4.7–6.1)
RBC # BLD AUTO: 4.24 X10(6)/MCL (ref 4.7–6.1)
RBC # BLD AUTO: 4.27 X10(6)/MCL (ref 4.7–6.1)
RBC # BLD AUTO: 4.28 X10(6)/MCL (ref 4.7–6.1)
RBC # BLD AUTO: 4.38 X10(6)/MCL (ref 4.7–6.1)
RBC # BLD AUTO: 4.39 X10(6)/MCL (ref 4.7–6.1)
RBC # BLD AUTO: 4.47 X10(6)/MCL (ref 4.7–6.1)
RBC # BLD AUTO: 4.67 X10(6)/MCL (ref 4.7–6.1)
RBC # BLD AUTO: 4.7 X10(6)/MCL (ref 4.7–6.1)
RBC # BLD AUTO: 4.7 X10(6)/MCL (ref 4.7–6.1)
RBC # BLD AUTO: 4.74 X10(6)/MCL (ref 4.7–6.1)
RBC # BLD AUTO: 4.77 X10(6)/MCL (ref 4.7–6.1)
RBC # BLD AUTO: 4.8 X10(6)/MCL (ref 4.7–6.1)
RBC # BLD AUTO: 5.15 X10(6)/MCL (ref 4.7–6.1)
RBC # BLD AUTO: 5.22 X10(6)/MCL (ref 4.7–6.1)
RBC #/AREA URNS AUTO: ABNORMAL /HPF
RBC #/AREA URNS AUTO: ABNORMAL /HPF
RBC #/AREA URNS AUTO: NORMAL /HPF
RBC #/AREA URNS AUTO: NORMAL /HPF
RBC MORPH BLD: NORMAL
RBC UR QL AUTO: ABNORMAL
RBC UR QL AUTO: ABNORMAL UNIT/L
RBC UR QL AUTO: NEGATIVE
RBC UR QL AUTO: NEGATIVE UNIT/L
RBC UR QL AUTO: NEGATIVE UNIT/L
RIGHT VENTRICULAR END-DIASTOLIC DIMENSION: 2.34 CM
RIGHT VENTRICULAR END-DIASTOLIC DIMENSION: 2.7 CM
RSV A 5' UTR RNA NPH QL NAA+PROBE: NOT DETECTED
RV TB RVSP: 5 MMHG
RVA RNA STL QL NAA+NON-PROBE: NOT DETECTED
RVA RNA STL QL NAA+NON-PROBE: NOT DETECTED
S ENT+BONG DNA STL QL NAA+NON-PROBE: NOT DETECTED
S ENT+BONG DNA STL QL NAA+NON-PROBE: NOT DETECTED
SAPO I+II+IV+V RNA STL QL NAA+NON-PROBE: NOT DETECTED
SAPO I+II+IV+V RNA STL QL NAA+NON-PROBE: NOT DETECTED
SARS-COV-2 RNA RESP QL NAA+PROBE: NOT DETECTED
SARS-COV-2 RNA RESP QL NAA+PROBE: NOT DETECTED
SHIGELLA SP+EIEC IPAH ST NAA+NON-PROBE: NOT DETECTED
SHIGELLA SP+EIEC IPAH ST NAA+NON-PROBE: NOT DETECTED
SODIUM SERPL-SCNC: 136 MMOL/L (ref 136–145)
SODIUM SERPL-SCNC: 137 MMOL/L (ref 136–145)
SODIUM SERPL-SCNC: 138 MMOL/L (ref 136–145)
SODIUM SERPL-SCNC: 139 MMOL/L (ref 136–145)
SODIUM SERPL-SCNC: 140 MMOL/L (ref 136–145)
SODIUM SERPL-SCNC: 141 MMOL/L (ref 136–145)
SODIUM SERPL-SCNC: 142 MMOL/L (ref 136–145)
SODIUM SERPL-SCNC: 143 MMOL/L (ref 136–145)
SP GR UR STRIP.AUTO: 1.01 (ref 1–1.03)
SP GR UR STRIP.AUTO: 1.02
SP GR UR STRIP.AUTO: 1.02
SP GR UR STRIP.AUTO: 1.02 (ref 1–1.03)
SP GR UR STRIP.AUTO: >=1.03
SPECIMEN OUTDATE: NORMAL
SQUAMOUS #/AREA URNS AUTO: ABNORMAL /HPF
SQUAMOUS #/AREA URNS AUTO: ABNORMAL /HPF
SQUAMOUS #/AREA URNS AUTO: NORMAL /HPF
SQUAMOUS #/AREA URNS AUTO: NORMAL /HPF
TR MAX PG: 14 MMHG
TR MAX PG: 18 MMHG
TR MEAN GRADIENT: 11 MMHG
TRICUSPID VALVE PEAK A WAVE VELOCITY: 0.05 M/S
TRICUSPID VALVE PEAK A WAVE VELOCITY: 0.36 M/S
TRIGL SERPL-MCNC: 131 MG/DL (ref 34–140)
TROPONIN I SERPL-MCNC: 0.01 NG/ML (ref 0–0.04)
TROPONIN I SERPL-MCNC: 0.02 NG/ML (ref 0–0.04)
TROPONIN I SERPL-MCNC: 0.02 NG/ML (ref 0–0.04)
TROPONIN I SERPL-MCNC: 0.03 NG/ML (ref 0–0.04)
TROPONIN I SERPL-MCNC: 0.03 NG/ML (ref 0–0.04)
TSH SERPL-ACNC: 6.8 UIU/ML (ref 0.35–4.94)
TV PEAK E VEL: 0.5 M/S
TV PEAK E VEL: 0.7 M/S
TV REST PULMONARY ARTERY PRESSURE: 21 MMHG
TV STENOSIS PRESSURE HALF TIME: 48.88 MS
TV STENOSIS PRESSURE HALF TIME: 65.66 MS
TV VALVE AREA P 1/2 METHOD: 2.89 CM2
TV VALVE AREA P 1/2 METHOD: 3.89 CM2
UNIT NUMBER: NORMAL
UROBILINOGEN UR STRIP-ACNC: 0.2
UROBILINOGEN UR STRIP-ACNC: 0.2
UROBILINOGEN UR STRIP-ACNC: 0.2 MG/DL
V CHOL+PARA+VUL DNA STL QL NAA+NON-PROBE: NOT DETECTED
V CHOL+PARA+VUL DNA STL QL NAA+NON-PROBE: NOT DETECTED
V CHOLERAE DNA STL QL NAA+NON-PROBE: NOT DETECTED
V CHOLERAE DNA STL QL NAA+NON-PROBE: NOT DETECTED
VLDLC SERPL CALC-MCNC: 26 MG/DL
VZV IGG SER IA-ACNC: 3.5
VZV IGG SER QL IA: POSITIVE
WBC # SPEC AUTO: 10.14 X10(3)/MCL (ref 4.5–11.5)
WBC # SPEC AUTO: 10.25 X10(3)/MCL (ref 4.5–11.5)
WBC # SPEC AUTO: 10.53 X10(3)/MCL (ref 4.5–11.5)
WBC # SPEC AUTO: 11.38 X10(3)/MCL (ref 4.5–11.5)
WBC # SPEC AUTO: 11.49 X10(3)/MCL (ref 4.5–11.5)
WBC # SPEC AUTO: 12.25 X10(3)/MCL (ref 4.5–11.5)
WBC # SPEC AUTO: 12.78 X10(3)/MCL (ref 4.5–11.5)
WBC # SPEC AUTO: 14.33 X10(3)/MCL (ref 4.5–11.5)
WBC # SPEC AUTO: 14.73 X10(3)/MCL (ref 4.5–11.5)
WBC # SPEC AUTO: 14.83 X10(3)/MCL (ref 4.5–11.5)
WBC # SPEC AUTO: 18.91 X10(3)/MCL (ref 4.5–11.5)
WBC # SPEC AUTO: 19.17 X10(3)/MCL (ref 4.5–11.5)
WBC # SPEC AUTO: 20 X10(3)/MCL (ref 4.5–11.5)
WBC # SPEC AUTO: 20.35 X10(3)/MCL (ref 4.5–11.5)
WBC # SPEC AUTO: 21.84 X10(3)/MCL (ref 4.5–11.5)
WBC # SPEC AUTO: 22.77 X10(3)/MCL (ref 4.5–11.5)
WBC # SPEC AUTO: 23.96 X10(3)/MCL (ref 4.5–11.5)
WBC # SPEC AUTO: 3.85 X10(3)/MCL (ref 4.5–11.5)
WBC # SPEC AUTO: 4.69 X10(3)/MCL (ref 4.5–11.5)
WBC # SPEC AUTO: 4.71 X10(3)/MCL (ref 4.5–11.5)
WBC # SPEC AUTO: 4.96 X10(3)/MCL (ref 4.5–11.5)
WBC # SPEC AUTO: 5.23 X10(3)/MCL (ref 4.5–11.5)
WBC # SPEC AUTO: 6.33 X10(3)/MCL (ref 4.5–11.5)
WBC # SPEC AUTO: 7.74 X10(3)/MCL (ref 4.5–11.5)
WBC # SPEC AUTO: 7.85 X10(3)/MCL (ref 4.5–11.5)
WBC # SPEC AUTO: 8.37 X10(3)/MCL (ref 4.5–11.5)
WBC # SPEC AUTO: 9.81 X10(3)/MCL (ref 4.5–11.5)
WBC # SPEC AUTO: 9.9 X10(3)/MCL (ref 4.5–11.5)
WBC #/AREA URNS AUTO: ABNORMAL /HPF
WBC #/AREA URNS AUTO: ABNORMAL /HPF
WBC #/AREA URNS AUTO: NORMAL /HPF
WBC #/AREA URNS AUTO: NORMAL /HPF
Y ENTEROCOL DNA STL QL NAA+NON-PROBE: NOT DETECTED
Y ENTEROCOL DNA STL QL NAA+NON-PROBE: NOT DETECTED
Z-SCORE OF LEFT VENTRICULAR DIMENSION IN END DIASTOLE: -0.16
Z-SCORE OF LEFT VENTRICULAR DIMENSION IN END SYSTOLE: 0.45

## 2023-01-01 PROCEDURE — G0378 HOSPITAL OBSERVATION PER HR: HCPCS

## 2023-01-01 PROCEDURE — 85027 COMPLETE CBC AUTOMATED: CPT | Performed by: STUDENT IN AN ORGANIZED HEALTH CARE EDUCATION/TRAINING PROGRAM

## 2023-01-01 PROCEDURE — 93010 ELECTROCARDIOGRAM REPORT: CPT | Mod: ,,, | Performed by: INTERNAL MEDICINE

## 2023-01-01 PROCEDURE — 99232 PR SUBSEQUENT HOSPITAL CARE,LEVL II: ICD-10-PCS | Mod: ,,, | Performed by: FAMILY MEDICINE

## 2023-01-01 PROCEDURE — 86140 C-REACTIVE PROTEIN: CPT | Performed by: SURGERY

## 2023-01-01 PROCEDURE — 25000003 PHARM REV CODE 250: Performed by: GENERAL PRACTICE

## 2023-01-01 PROCEDURE — 27000173 HC ACAPELLA DEVICE DH OR DM

## 2023-01-01 PROCEDURE — 0241U COVID/RSV/FLU A&B PCR: CPT | Performed by: GENERAL PRACTICE

## 2023-01-01 PROCEDURE — 36430 TRANSFUSION BLD/BLD COMPNT: CPT

## 2023-01-01 PROCEDURE — 99900035 HC TECH TIME PER 15 MIN (STAT)

## 2023-01-01 PROCEDURE — 83735 ASSAY OF MAGNESIUM: CPT

## 2023-01-01 PROCEDURE — 25000242 PHARM REV CODE 250 ALT 637 W/ HCPCS: Performed by: FAMILY MEDICINE

## 2023-01-01 PROCEDURE — 83735 ASSAY OF MAGNESIUM: CPT | Performed by: FAMILY MEDICINE

## 2023-01-01 PROCEDURE — D9220A PRA ANESTHESIA: ICD-10-PCS | Mod: CRNA,,, | Performed by: NURSE ANESTHETIST, CERTIFIED REGISTERED

## 2023-01-01 PROCEDURE — 99213 OFFICE O/P EST LOW 20 MIN: CPT | Mod: ,,, | Performed by: FAMILY MEDICINE

## 2023-01-01 PROCEDURE — 94761 N-INVAS EAR/PLS OXIMETRY MLT: CPT

## 2023-01-01 PROCEDURE — 63600175 PHARM REV CODE 636 W HCPCS: Performed by: FAMILY MEDICINE

## 2023-01-01 PROCEDURE — 80061 LIPID PANEL: CPT

## 2023-01-01 PROCEDURE — S0030 INJECTION, METRONIDAZOLE: HCPCS | Performed by: GENERAL PRACTICE

## 2023-01-01 PROCEDURE — 11000001 HC ACUTE MED/SURG PRIVATE ROOM

## 2023-01-01 PROCEDURE — 80053 COMPREHEN METABOLIC PANEL: CPT

## 2023-01-01 PROCEDURE — 25000003 PHARM REV CODE 250: Performed by: FAMILY MEDICINE

## 2023-01-01 PROCEDURE — 63600175 PHARM REV CODE 636 W HCPCS: Performed by: NURSE ANESTHETIST, CERTIFIED REGISTERED

## 2023-01-01 PROCEDURE — 63600175 PHARM REV CODE 636 W HCPCS: Performed by: SURGERY

## 2023-01-01 PROCEDURE — 99214 PR OFFICE/OUTPT VISIT, EST, LEVL IV, 30-39 MIN: ICD-10-PCS | Mod: ,,, | Performed by: FAMILY MEDICINE

## 2023-01-01 PROCEDURE — 83735 ASSAY OF MAGNESIUM: CPT | Performed by: INTERNAL MEDICINE

## 2023-01-01 PROCEDURE — 63600175 PHARM REV CODE 636 W HCPCS: Performed by: GENERAL PRACTICE

## 2023-01-01 PROCEDURE — 87040 BLOOD CULTURE FOR BACTERIA: CPT | Performed by: GENERAL PRACTICE

## 2023-01-01 PROCEDURE — 25000003 PHARM REV CODE 250: Performed by: INTERNAL MEDICINE

## 2023-01-01 PROCEDURE — 80048 BASIC METABOLIC PNL TOTAL CA: CPT

## 2023-01-01 PROCEDURE — 37000009 HC ANESTHESIA EA ADD 15 MINS: Performed by: INTERNAL MEDICINE

## 2023-01-01 PROCEDURE — S0030 INJECTION, METRONIDAZOLE: HCPCS | Performed by: FAMILY MEDICINE

## 2023-01-01 PROCEDURE — 96375 TX/PRO/DX INJ NEW DRUG ADDON: CPT

## 2023-01-01 PROCEDURE — 99232 SBSQ HOSP IP/OBS MODERATE 35: CPT | Mod: ,,, | Performed by: FAMILY MEDICINE

## 2023-01-01 PROCEDURE — 86709 HEPATITIS A IGM ANTIBODY: CPT

## 2023-01-01 PROCEDURE — 92960 CARDIOVERSION ELECTRIC EXT: CPT

## 2023-01-01 PROCEDURE — 97162 PT EVAL MOD COMPLEX 30 MIN: CPT

## 2023-01-01 PROCEDURE — 80053 COMPREHEN METABOLIC PANEL: CPT | Performed by: FAMILY MEDICINE

## 2023-01-01 PROCEDURE — 99239 PR HOSPITAL DISCHARGE DAY,>30 MIN: ICD-10-PCS | Mod: ,,, | Performed by: FAMILY MEDICINE

## 2023-01-01 PROCEDURE — 97535 SELF CARE MNGMENT TRAINING: CPT

## 2023-01-01 PROCEDURE — S0179 MEGESTROL 20 MG: HCPCS | Performed by: INTERNAL MEDICINE

## 2023-01-01 PROCEDURE — 85027 COMPLETE CBC AUTOMATED: CPT | Performed by: FAMILY MEDICINE

## 2023-01-01 PROCEDURE — 99285 EMERGENCY DEPT VISIT HI MDM: CPT | Mod: 25

## 2023-01-01 PROCEDURE — 94664 DEMO&/EVAL PT USE INHALER: CPT

## 2023-01-01 PROCEDURE — 85025 COMPLETE CBC W/AUTO DIFF WBC: CPT | Performed by: FAMILY MEDICINE

## 2023-01-01 PROCEDURE — 84100 ASSAY OF PHOSPHORUS: CPT | Performed by: FAMILY MEDICINE

## 2023-01-01 PROCEDURE — 88305 TISSUE EXAM BY PATHOLOGIST: CPT

## 2023-01-01 PROCEDURE — 94640 AIRWAY INHALATION TREATMENT: CPT

## 2023-01-01 PROCEDURE — 86803 HEPATITIS C AB TEST: CPT

## 2023-01-01 PROCEDURE — 37000008 HC ANESTHESIA 1ST 15 MINUTES: Performed by: INTERNAL MEDICINE

## 2023-01-01 PROCEDURE — 92523 SPEECH SOUND LANG COMPREHEN: CPT

## 2023-01-01 PROCEDURE — 85027 COMPLETE CBC AUTOMATED: CPT

## 2023-01-01 PROCEDURE — 25000003 PHARM REV CODE 250: Performed by: NURSE ANESTHETIST, CERTIFIED REGISTERED

## 2023-01-01 PROCEDURE — 99222 1ST HOSP IP/OBS MODERATE 55: CPT | Mod: AI,,, | Performed by: FAMILY MEDICINE

## 2023-01-01 PROCEDURE — 94799 UNLISTED PULMONARY SVC/PX: CPT

## 2023-01-01 PROCEDURE — 80053 COMPREHEN METABOLIC PANEL: CPT | Mod: 91 | Performed by: FAMILY MEDICINE

## 2023-01-01 PROCEDURE — 93005 ELECTROCARDIOGRAM TRACING: CPT

## 2023-01-01 PROCEDURE — P9047 ALBUMIN (HUMAN), 25%, 50ML: HCPCS | Mod: JZ,TB

## 2023-01-01 PROCEDURE — 84484 ASSAY OF TROPONIN QUANT: CPT | Performed by: STUDENT IN AN ORGANIZED HEALTH CARE EDUCATION/TRAINING PROGRAM

## 2023-01-01 PROCEDURE — 86708 HEPATITIS A ANTIBODY: CPT

## 2023-01-01 PROCEDURE — 36415 COLL VENOUS BLD VENIPUNCTURE: CPT

## 2023-01-01 PROCEDURE — 99239 HOSP IP/OBS DSCHRG MGMT >30: CPT | Mod: ,,, | Performed by: FAMILY MEDICINE

## 2023-01-01 PROCEDURE — 96374 THER/PROPH/DIAG INJ IV PUSH: CPT

## 2023-01-01 PROCEDURE — 83605 ASSAY OF LACTIC ACID: CPT | Performed by: SURGERY

## 2023-01-01 PROCEDURE — 83605 ASSAY OF LACTIC ACID: CPT | Mod: 91 | Performed by: FAMILY MEDICINE

## 2023-01-01 PROCEDURE — 80053 COMPREHEN METABOLIC PANEL: CPT | Performed by: INTERNAL MEDICINE

## 2023-01-01 PROCEDURE — 84484 ASSAY OF TROPONIN QUANT: CPT | Performed by: FAMILY MEDICINE

## 2023-01-01 PROCEDURE — 11000004 HC SNF PRIVATE

## 2023-01-01 PROCEDURE — 96376 TX/PRO/DX INJ SAME DRUG ADON: CPT

## 2023-01-01 PROCEDURE — 85610 PROTHROMBIN TIME: CPT | Performed by: STUDENT IN AN ORGANIZED HEALTH CARE EDUCATION/TRAINING PROGRAM

## 2023-01-01 PROCEDURE — 85027 COMPLETE CBC AUTOMATED: CPT | Performed by: GENERAL PRACTICE

## 2023-01-01 PROCEDURE — D9220A PRA ANESTHESIA: ICD-10-PCS | Mod: ANES,,, | Performed by: ANESTHESIOLOGY

## 2023-01-01 PROCEDURE — 25000003 PHARM REV CODE 250

## 2023-01-01 PROCEDURE — 93010 EKG 12-LEAD: ICD-10-PCS | Mod: ,,, | Performed by: INTERNAL MEDICINE

## 2023-01-01 PROCEDURE — 85025 COMPLETE CBC W/AUTO DIFF WBC: CPT | Performed by: INTERNAL MEDICINE

## 2023-01-01 PROCEDURE — 84443 ASSAY THYROID STIM HORMONE: CPT | Performed by: GENERAL PRACTICE

## 2023-01-01 PROCEDURE — 27000221 HC OXYGEN, UP TO 24 HOURS

## 2023-01-01 PROCEDURE — 37000008 HC ANESTHESIA 1ST 15 MINUTES

## 2023-01-01 PROCEDURE — 82272 OCCULT BLD FECES 1-3 TESTS: CPT | Performed by: FAMILY MEDICINE

## 2023-01-01 PROCEDURE — 63600175 PHARM REV CODE 636 W HCPCS: Mod: JZ,TB

## 2023-01-01 PROCEDURE — 85379 FIBRIN DEGRADATION QUANT: CPT | Performed by: GENERAL PRACTICE

## 2023-01-01 PROCEDURE — 85025 COMPLETE CBC W/AUTO DIFF WBC: CPT | Performed by: STUDENT IN AN ORGANIZED HEALTH CARE EDUCATION/TRAINING PROGRAM

## 2023-01-01 PROCEDURE — P9016 RBC LEUKOCYTES REDUCED: HCPCS | Performed by: INTERNAL MEDICINE

## 2023-01-01 PROCEDURE — 45380 COLONOSCOPY AND BIOPSY: CPT | Mod: PT | Performed by: INTERNAL MEDICINE

## 2023-01-01 PROCEDURE — 86706 HEP B SURFACE ANTIBODY: CPT

## 2023-01-01 PROCEDURE — 97166 OT EVAL MOD COMPLEX 45 MIN: CPT

## 2023-01-01 PROCEDURE — 86787 VARICELLA-ZOSTER ANTIBODY: CPT

## 2023-01-01 PROCEDURE — 85610 PROTHROMBIN TIME: CPT

## 2023-01-01 PROCEDURE — 25000003 PHARM REV CODE 250: Performed by: NURSE PRACTITIONER

## 2023-01-01 PROCEDURE — P9047 ALBUMIN (HUMAN), 25%, 50ML: HCPCS | Mod: JZ,TB | Performed by: INTERNAL MEDICINE

## 2023-01-01 PROCEDURE — 85652 RBC SED RATE AUTOMATED: CPT

## 2023-01-01 PROCEDURE — 25000003 PHARM REV CODE 250: Performed by: SURGERY

## 2023-01-01 PROCEDURE — 85025 COMPLETE CBC W/AUTO DIFF WBC: CPT

## 2023-01-01 PROCEDURE — S0179 MEGESTROL 20 MG: HCPCS | Performed by: FAMILY MEDICINE

## 2023-01-01 PROCEDURE — 27201423 OPTIME MED/SURG SUP & DEVICES STERILE SUPPLY: Performed by: INTERNAL MEDICINE

## 2023-01-01 PROCEDURE — 96372 THER/PROPH/DIAG INJ SC/IM: CPT | Mod: 59 | Performed by: GENERAL PRACTICE

## 2023-01-01 PROCEDURE — 99222 PR INITIAL HOSPITAL CARE,LEVL II: ICD-10-PCS | Mod: AI,,, | Performed by: FAMILY MEDICINE

## 2023-01-01 PROCEDURE — 80053 COMPREHEN METABOLIC PANEL: CPT | Performed by: SURGERY

## 2023-01-01 PROCEDURE — 97530 THERAPEUTIC ACTIVITIES: CPT

## 2023-01-01 PROCEDURE — 99214 OFFICE O/P EST MOD 30 MIN: CPT | Mod: ,,, | Performed by: FAMILY MEDICINE

## 2023-01-01 PROCEDURE — 99213 PR OFFICE/OUTPT VISIT, EST, LEVL III, 20-29 MIN: ICD-10-PCS | Mod: ,,, | Performed by: FAMILY MEDICINE

## 2023-01-01 PROCEDURE — 81003 URINALYSIS AUTO W/O SCOPE: CPT | Performed by: GENERAL PRACTICE

## 2023-01-01 PROCEDURE — G0180 MD CERTIFICATION HHA PATIENT: HCPCS | Mod: ,,, | Performed by: FAMILY MEDICINE

## 2023-01-01 PROCEDURE — 87507 IADNA-DNA/RNA PROBE TQ 12-25: CPT | Performed by: FAMILY MEDICINE

## 2023-01-01 PROCEDURE — 85730 THROMBOPLASTIN TIME PARTIAL: CPT | Performed by: GENERAL PRACTICE

## 2023-01-01 PROCEDURE — 84484 ASSAY OF TROPONIN QUANT: CPT | Performed by: NURSE PRACTITIONER

## 2023-01-01 PROCEDURE — 82553 CREATINE MB FRACTION: CPT | Performed by: GENERAL PRACTICE

## 2023-01-01 PROCEDURE — 97116 GAIT TRAINING THERAPY: CPT

## 2023-01-01 PROCEDURE — 81001 URINALYSIS AUTO W/SCOPE: CPT | Performed by: FAMILY MEDICINE

## 2023-01-01 PROCEDURE — 0240U COVID/FLU A&B PCR: CPT | Performed by: STUDENT IN AN ORGANIZED HEALTH CARE EDUCATION/TRAINING PROGRAM

## 2023-01-01 PROCEDURE — 87389 HIV-1 AG W/HIV-1&-2 AB AG IA: CPT

## 2023-01-01 PROCEDURE — 99203 PR OFFICE/OUTPT VISIT, NEW, LEVL III, 30-44 MIN: ICD-10-PCS | Mod: ,,, | Performed by: REGISTERED NURSE

## 2023-01-01 PROCEDURE — 87040 BLOOD CULTURE FOR BACTERIA: CPT | Performed by: STUDENT IN AN ORGANIZED HEALTH CARE EDUCATION/TRAINING PROGRAM

## 2023-01-01 PROCEDURE — 84484 ASSAY OF TROPONIN QUANT: CPT | Performed by: GENERAL PRACTICE

## 2023-01-01 PROCEDURE — 80048 BASIC METABOLIC PNL TOTAL CA: CPT | Performed by: GENERAL PRACTICE

## 2023-01-01 PROCEDURE — 83690 ASSAY OF LIPASE: CPT | Performed by: GENERAL PRACTICE

## 2023-01-01 PROCEDURE — 92526 ORAL FUNCTION THERAPY: CPT

## 2023-01-01 PROCEDURE — 81001 URINALYSIS AUTO W/SCOPE: CPT | Performed by: GENERAL PRACTICE

## 2023-01-01 PROCEDURE — 12002 RPR S/N/AX/GEN/TRNK2.6-7.5CM: CPT

## 2023-01-01 PROCEDURE — 25000003 PHARM REV CODE 250: Performed by: STUDENT IN AN ORGANIZED HEALTH CARE EDUCATION/TRAINING PROGRAM

## 2023-01-01 PROCEDURE — 83880 ASSAY OF NATRIURETIC PEPTIDE: CPT | Performed by: GENERAL PRACTICE

## 2023-01-01 PROCEDURE — 80053 COMPREHEN METABOLIC PANEL: CPT | Performed by: GENERAL PRACTICE

## 2023-01-01 PROCEDURE — 93005 ELECTROCARDIOGRAM TRACING: CPT | Mod: 59

## 2023-01-01 PROCEDURE — 89055 LEUKOCYTE ASSESSMENT FECAL: CPT | Performed by: GENERAL PRACTICE

## 2023-01-01 PROCEDURE — 81003 URINALYSIS AUTO W/O SCOPE: CPT | Performed by: STUDENT IN AN ORGANIZED HEALTH CARE EDUCATION/TRAINING PROGRAM

## 2023-01-01 PROCEDURE — 83880 ASSAY OF NATRIURETIC PEPTIDE: CPT | Performed by: STUDENT IN AN ORGANIZED HEALTH CARE EDUCATION/TRAINING PROGRAM

## 2023-01-01 PROCEDURE — 99203 OFFICE O/P NEW LOW 30 MIN: CPT | Mod: ,,, | Performed by: REGISTERED NURSE

## 2023-01-01 PROCEDURE — 87045 FECES CULTURE AEROBIC BACT: CPT | Performed by: GENERAL PRACTICE

## 2023-01-01 PROCEDURE — 63600175 PHARM REV CODE 636 W HCPCS: Mod: JZ,TB | Performed by: INTERNAL MEDICINE

## 2023-01-01 PROCEDURE — 86704 HEP B CORE ANTIBODY TOTAL: CPT

## 2023-01-01 PROCEDURE — 92610 EVALUATE SWALLOWING FUNCTION: CPT

## 2023-01-01 PROCEDURE — 88341 IMHCHEM/IMCYTCHM EA ADD ANTB: CPT

## 2023-01-01 PROCEDURE — P9047 ALBUMIN (HUMAN), 25%, 50ML: HCPCS | Mod: TB | Performed by: GENERAL PRACTICE

## 2023-01-01 PROCEDURE — 80053 COMPREHEN METABOLIC PANEL: CPT | Performed by: STUDENT IN AN ORGANIZED HEALTH CARE EDUCATION/TRAINING PROGRAM

## 2023-01-01 PROCEDURE — 85025 COMPLETE CBC W/AUTO DIFF WBC: CPT | Performed by: GENERAL PRACTICE

## 2023-01-01 PROCEDURE — 83605 ASSAY OF LACTIC ACID: CPT | Performed by: FAMILY MEDICINE

## 2023-01-01 PROCEDURE — D9220A PRA ANESTHESIA: Mod: CRNA,,, | Performed by: NURSE ANESTHETIST, CERTIFIED REGISTERED

## 2023-01-01 PROCEDURE — 82550 ASSAY OF CK (CPK): CPT | Performed by: GENERAL PRACTICE

## 2023-01-01 PROCEDURE — 63600175 PHARM REV CODE 636 W HCPCS: Performed by: INTERNAL MEDICINE

## 2023-01-01 PROCEDURE — 85730 THROMBOPLASTIN TIME PARTIAL: CPT | Performed by: STUDENT IN AN ORGANIZED HEALTH CARE EDUCATION/TRAINING PROGRAM

## 2023-01-01 PROCEDURE — 80048 BASIC METABOLIC PNL TOTAL CA: CPT | Performed by: FAMILY MEDICINE

## 2023-01-01 PROCEDURE — 63600175 PHARM REV CODE 636 W HCPCS: Performed by: NURSE PRACTITIONER

## 2023-01-01 PROCEDURE — 30233N1 TRANSFUSION OF NONAUTOLOGOUS RED BLOOD CELLS INTO PERIPHERAL VEIN, PERCUTANEOUS APPROACH: ICD-10-PCS | Performed by: INTERNAL MEDICINE

## 2023-01-01 PROCEDURE — 82150 ASSAY OF AMYLASE: CPT | Performed by: GENERAL PRACTICE

## 2023-01-01 PROCEDURE — P9047 ALBUMIN (HUMAN), 25%, 50ML: HCPCS | Performed by: FAMILY MEDICINE

## 2023-01-01 PROCEDURE — 83605 ASSAY OF LACTIC ACID: CPT | Performed by: STUDENT IN AN ORGANIZED HEALTH CARE EDUCATION/TRAINING PROGRAM

## 2023-01-01 PROCEDURE — 86923 COMPATIBILITY TEST ELECTRIC: CPT | Performed by: INTERNAL MEDICINE

## 2023-01-01 PROCEDURE — 99900031 HC PATIENT EDUCATION (STAT)

## 2023-01-01 PROCEDURE — 87086 URINE CULTURE/COLONY COUNT: CPT | Performed by: STUDENT IN AN ORGANIZED HEALTH CARE EDUCATION/TRAINING PROGRAM

## 2023-01-01 PROCEDURE — 83605 ASSAY OF LACTIC ACID: CPT | Performed by: GENERAL PRACTICE

## 2023-01-01 PROCEDURE — 83605 ASSAY OF LACTIC ACID: CPT | Mod: 91 | Performed by: GENERAL PRACTICE

## 2023-01-01 PROCEDURE — 96360 HYDRATION IV INFUSION INIT: CPT

## 2023-01-01 PROCEDURE — 63600175 PHARM REV CODE 636 W HCPCS: Performed by: STUDENT IN AN ORGANIZED HEALTH CARE EDUCATION/TRAINING PROGRAM

## 2023-01-01 PROCEDURE — D9220A PRA ANESTHESIA: Mod: ANES,,, | Performed by: ANESTHESIOLOGY

## 2023-01-01 PROCEDURE — 96365 THER/PROPH/DIAG IV INF INIT: CPT

## 2023-01-01 PROCEDURE — 87209 SMEAR COMPLEX STAIN: CPT | Performed by: GENERAL PRACTICE

## 2023-01-01 PROCEDURE — A4216 STERILE WATER/SALINE, 10 ML: HCPCS | Performed by: FAMILY MEDICINE

## 2023-01-01 PROCEDURE — 86480 TB TEST CELL IMMUN MEASURE: CPT

## 2023-01-01 PROCEDURE — 87340 HEPATITIS B SURFACE AG IA: CPT

## 2023-01-01 PROCEDURE — 97129 THER IVNTJ 1ST 15 MIN: CPT

## 2023-01-01 PROCEDURE — 87507 IADNA-DNA/RNA PROBE TQ 12-25: CPT | Performed by: INTERNAL MEDICINE

## 2023-01-01 PROCEDURE — 96361 HYDRATE IV INFUSION ADD-ON: CPT

## 2023-01-01 PROCEDURE — 85610 PROTHROMBIN TIME: CPT | Performed by: GENERAL PRACTICE

## 2023-01-01 PROCEDURE — 80048 BASIC METABOLIC PNL TOTAL CA: CPT | Performed by: INTERNAL MEDICINE

## 2023-01-01 PROCEDURE — 31622 DX BRONCHOSCOPE/WASH: CPT

## 2023-01-01 PROCEDURE — 88342 IMHCHEM/IMCYTCHM 1ST ANTB: CPT | Performed by: INTERNAL MEDICINE

## 2023-01-01 PROCEDURE — 00811 ANES LWR INTST NDSC NOS: CPT | Mod: QZ,P3,QS | Performed by: NURSE ANESTHETIST, CERTIFIED REGISTERED

## 2023-01-01 PROCEDURE — 25000242 PHARM REV CODE 250 ALT 637 W/ HCPCS: Performed by: GENERAL PRACTICE

## 2023-01-01 PROCEDURE — 86850 RBC ANTIBODY SCREEN: CPT | Performed by: INTERNAL MEDICINE

## 2023-01-01 PROCEDURE — 85007 BL SMEAR W/DIFF WBC COUNT: CPT | Performed by: GENERAL PRACTICE

## 2023-01-01 PROCEDURE — D9220AH HC ANESTHESIA PROFESSIONAL FEE: Mod: QZ,P3,QS | Performed by: NURSE ANESTHETIST, CERTIFIED REGISTERED

## 2023-01-01 RX ORDER — ALBUMIN HUMAN 250 G/1000ML
50 SOLUTION INTRAVENOUS ONCE
Status: COMPLETED | OUTPATIENT
Start: 2023-01-01 | End: 2023-01-01

## 2023-01-01 RX ORDER — PROPOFOL 10 MG/ML
VIAL (ML) INTRAVENOUS
Status: DISCONTINUED | OUTPATIENT
Start: 2023-01-01 | End: 2023-01-01

## 2023-01-01 RX ORDER — MAGNESIUM SULFATE 1 G/100ML
1 INJECTION INTRAVENOUS ONCE
Status: COMPLETED | OUTPATIENT
Start: 2023-01-01 | End: 2023-01-01

## 2023-01-01 RX ORDER — GABAPENTIN 300 MG/1
300 CAPSULE ORAL DAILY PRN
Status: DISCONTINUED | OUTPATIENT
Start: 2023-01-01 | End: 2023-01-01 | Stop reason: HOSPADM

## 2023-01-01 RX ORDER — POTASSIUM CHLORIDE 750 MG/1
40 TABLET, EXTENDED RELEASE ORAL ONCE
Status: COMPLETED | OUTPATIENT
Start: 2023-01-01 | End: 2023-01-01

## 2023-01-01 RX ORDER — METRONIDAZOLE 500 MG/100ML
500 INJECTION, SOLUTION INTRAVENOUS
Status: ACTIVE | OUTPATIENT
Start: 2023-01-01 | End: 2023-01-01

## 2023-01-01 RX ORDER — LIDOCAINE HYDROCHLORIDE 10 MG/ML
INJECTION, SOLUTION EPIDURAL; INFILTRATION; INTRACAUDAL; PERINEURAL
Status: DISCONTINUED | OUTPATIENT
Start: 2023-01-01 | End: 2023-01-01

## 2023-01-01 RX ORDER — FAMOTIDINE 20 MG/1
20 TABLET, FILM COATED ORAL DAILY
Status: DISCONTINUED | OUTPATIENT
Start: 2023-01-01 | End: 2023-01-01

## 2023-01-01 RX ORDER — SOTALOL HYDROCHLORIDE 80 MG/1
80 TABLET ORAL 2 TIMES DAILY
Status: DISCONTINUED | OUTPATIENT
Start: 2023-01-01 | End: 2023-01-01

## 2023-01-01 RX ORDER — ACETAMINOPHEN 325 MG/1
650 TABLET ORAL EVERY 8 HOURS PRN
Status: DISCONTINUED | OUTPATIENT
Start: 2023-01-01 | End: 2023-01-01 | Stop reason: HOSPADM

## 2023-01-01 RX ORDER — SODIUM CHLORIDE, SODIUM GLUCONATE, SODIUM ACETATE, POTASSIUM CHLORIDE AND MAGNESIUM CHLORIDE 30; 37; 368; 526; 502 MG/100ML; MG/100ML; MG/100ML; MG/100ML; MG/100ML
INJECTION, SOLUTION INTRAVENOUS CONTINUOUS
Status: DISCONTINUED | OUTPATIENT
Start: 2023-01-01 | End: 2023-01-01 | Stop reason: HOSPADM

## 2023-01-01 RX ORDER — ACETAMINOPHEN 325 MG/1
650 TABLET ORAL EVERY 8 HOURS PRN
Status: CANCELLED | OUTPATIENT
Start: 2023-01-01

## 2023-01-01 RX ORDER — METOPROLOL TARTRATE 1 MG/ML
5 INJECTION, SOLUTION INTRAVENOUS
Status: DISCONTINUED | OUTPATIENT
Start: 2023-01-01 | End: 2023-01-01 | Stop reason: HOSPADM

## 2023-01-01 RX ORDER — LACTOBACILLUS ACIDOPHILUS 500MM CELL
1 CAPSULE ORAL ONCE
Status: COMPLETED | OUTPATIENT
Start: 2023-01-01 | End: 2023-01-01

## 2023-01-01 RX ORDER — ENOXAPARIN SODIUM 100 MG/ML
40 INJECTION SUBCUTANEOUS EVERY 24 HOURS
Status: DISCONTINUED | OUTPATIENT
Start: 2023-01-01 | End: 2023-01-01

## 2023-01-01 RX ORDER — TALC
6 POWDER (GRAM) TOPICAL NIGHTLY PRN
Status: DISCONTINUED | OUTPATIENT
Start: 2023-01-01 | End: 2023-01-01 | Stop reason: HOSPADM

## 2023-01-01 RX ORDER — ENOXAPARIN SODIUM 100 MG/ML
30 INJECTION SUBCUTANEOUS EVERY 24 HOURS
Status: DISCONTINUED | OUTPATIENT
Start: 2023-01-01 | End: 2023-01-01 | Stop reason: DRUGHIGH

## 2023-01-01 RX ORDER — ALBUMIN HUMAN 250 G/1000ML
12.5 SOLUTION INTRAVENOUS EVERY 8 HOURS
Status: DISCONTINUED | OUTPATIENT
Start: 2023-01-01 | End: 2023-01-01

## 2023-01-01 RX ORDER — FAMOTIDINE 20 MG/1
20 TABLET, FILM COATED ORAL 2 TIMES DAILY
Status: DISCONTINUED | OUTPATIENT
Start: 2023-01-01 | End: 2023-01-01 | Stop reason: HOSPADM

## 2023-01-01 RX ORDER — HYDROMORPHONE HYDROCHLORIDE 2 MG/ML
1 INJECTION, SOLUTION INTRAMUSCULAR; INTRAVENOUS; SUBCUTANEOUS EVERY 4 HOURS PRN
Status: DISCONTINUED | OUTPATIENT
Start: 2023-01-01 | End: 2023-01-01 | Stop reason: HOSPADM

## 2023-01-01 RX ORDER — ENOXAPARIN SODIUM 100 MG/ML
40 INJECTION SUBCUTANEOUS EVERY 24 HOURS
Status: DISCONTINUED | OUTPATIENT
Start: 2023-01-01 | End: 2023-01-01 | Stop reason: HOSPADM

## 2023-01-01 RX ORDER — POLYETHYLENE GLYCOL 3350, SODIUM SULFATE ANHYDROUS, SODIUM BICARBONATE, SODIUM CHLORIDE, POTASSIUM CHLORIDE 236; 22.74; 6.74; 5.86; 2.97 G/4L; G/4L; G/4L; G/4L; G/4L
4000 POWDER, FOR SOLUTION ORAL ONCE
Status: COMPLETED | OUTPATIENT
Start: 2023-01-01 | End: 2023-01-01

## 2023-01-01 RX ORDER — POLYETHYLENE GLYCOL 3350 17 G/17G
17 POWDER, FOR SOLUTION ORAL DAILY
Status: DISCONTINUED | OUTPATIENT
Start: 2023-01-01 | End: 2023-01-01

## 2023-01-01 RX ORDER — SODIUM CHLORIDE 9 MG/ML
INJECTION, SOLUTION INTRAVENOUS CONTINUOUS
Status: DISCONTINUED | OUTPATIENT
Start: 2023-01-01 | End: 2023-01-01

## 2023-01-01 RX ORDER — ONDANSETRON HYDROCHLORIDE 2 MG/ML
4 INJECTION, SOLUTION INTRAVENOUS EVERY 8 HOURS PRN
Status: DISCONTINUED | OUTPATIENT
Start: 2023-01-01 | End: 2023-01-01 | Stop reason: HOSPADM

## 2023-01-01 RX ORDER — SOTALOL HYDROCHLORIDE 120 MG/1
120 TABLET ORAL 2 TIMES DAILY
Status: ON HOLD | COMMUNITY
Start: 2023-01-01 | End: 2023-01-01 | Stop reason: HOSPADM

## 2023-01-01 RX ORDER — ENOXAPARIN SODIUM 100 MG/ML
30 INJECTION SUBCUTANEOUS EVERY 24 HOURS
Status: CANCELLED | OUTPATIENT
Start: 2023-01-01

## 2023-01-01 RX ORDER — ALBUMIN HUMAN 250 G/1000ML
12.5 SOLUTION INTRAVENOUS ONCE
Status: COMPLETED | OUTPATIENT
Start: 2023-01-01 | End: 2023-01-01

## 2023-01-01 RX ORDER — ATENOLOL 25 MG/1
TABLET ORAL
Qty: 90 TABLET | Refills: 0 | Status: SHIPPED | OUTPATIENT
Start: 2023-01-01 | End: 2023-01-01

## 2023-01-01 RX ORDER — IPRATROPIUM BROMIDE AND ALBUTEROL SULFATE 2.5; .5 MG/3ML; MG/3ML
3 SOLUTION RESPIRATORY (INHALATION) EVERY 6 HOURS
Status: DISCONTINUED | OUTPATIENT
Start: 2023-01-01 | End: 2023-01-01 | Stop reason: HOSPADM

## 2023-01-01 RX ORDER — MUPIROCIN 20 MG/G
OINTMENT TOPICAL 2 TIMES DAILY
Status: DISCONTINUED | OUTPATIENT
Start: 2023-01-01 | End: 2023-01-01 | Stop reason: HOSPADM

## 2023-01-01 RX ORDER — SOTALOL HYDROCHLORIDE 80 MG/1
80 TABLET ORAL 2 TIMES DAILY
COMMUNITY
Start: 2023-01-01 | End: 2023-01-01 | Stop reason: HOSPADM

## 2023-01-01 RX ORDER — ENOXAPARIN SODIUM 100 MG/ML
30 INJECTION SUBCUTANEOUS EVERY 24 HOURS
Status: DISCONTINUED | OUTPATIENT
Start: 2023-01-01 | End: 2023-01-01

## 2023-01-01 RX ORDER — METOPROLOL TARTRATE 1 MG/ML
5 INJECTION, SOLUTION INTRAVENOUS EVERY 4 HOURS PRN
Status: DISCONTINUED | OUTPATIENT
Start: 2023-01-01 | End: 2023-01-01 | Stop reason: HOSPADM

## 2023-01-01 RX ORDER — SODIUM CITRATE AND CITRIC ACID MONOHYDRATE 334; 500 MG/5ML; MG/5ML
30 SOLUTION ORAL
Status: CANCELLED | OUTPATIENT
Start: 2023-01-01

## 2023-01-01 RX ORDER — POTASSIUM CHLORIDE 7.45 MG/ML
40 INJECTION INTRAVENOUS ONCE
Status: COMPLETED | OUTPATIENT
Start: 2023-01-01 | End: 2023-01-01

## 2023-01-01 RX ORDER — SUCRALFATE 1 G/1
1 TABLET ORAL
Status: CANCELLED | OUTPATIENT
Start: 2023-01-01

## 2023-01-01 RX ORDER — METRONIDAZOLE 500 MG/100ML
500 INJECTION, SOLUTION INTRAVENOUS
Status: COMPLETED | OUTPATIENT
Start: 2023-01-01 | End: 2023-01-01

## 2023-01-01 RX ORDER — LIDOCAINE HYDROCHLORIDE 20 MG/ML
INJECTION, SOLUTION EPIDURAL; INFILTRATION; INTRACAUDAL; PERINEURAL
Status: DISCONTINUED | OUTPATIENT
Start: 2023-01-01 | End: 2023-01-01

## 2023-01-01 RX ORDER — SODIUM CHLORIDE 9 MG/ML
1000 INJECTION, SOLUTION INTRAVENOUS
Status: COMPLETED | OUTPATIENT
Start: 2023-01-01 | End: 2023-01-01

## 2023-01-01 RX ORDER — IPRATROPIUM BROMIDE AND ALBUTEROL SULFATE 2.5; .5 MG/3ML; MG/3ML
3 SOLUTION RESPIRATORY (INHALATION)
Status: COMPLETED | OUTPATIENT
Start: 2023-01-01 | End: 2023-01-01

## 2023-01-01 RX ORDER — PROPOFOL 10 MG/ML
VIAL (ML) INTRAVENOUS
Status: COMPLETED
Start: 2023-01-01 | End: 2023-01-01

## 2023-01-01 RX ORDER — ONDANSETRON 2 MG/ML
4 INJECTION INTRAMUSCULAR; INTRAVENOUS EVERY 8 HOURS PRN
Status: DISCONTINUED | OUTPATIENT
Start: 2023-01-01 | End: 2023-01-01 | Stop reason: HOSPADM

## 2023-01-01 RX ORDER — GABAPENTIN 300 MG/1
300 CAPSULE ORAL DAILY PRN
Status: CANCELLED | OUTPATIENT
Start: 2023-01-01

## 2023-01-01 RX ORDER — METOPROLOL TARTRATE 25 MG/1
25 TABLET, FILM COATED ORAL 2 TIMES DAILY
Status: DISCONTINUED | OUTPATIENT
Start: 2023-01-01 | End: 2023-01-01 | Stop reason: HOSPADM

## 2023-01-01 RX ORDER — IPRATROPIUM BROMIDE AND ALBUTEROL SULFATE 2.5; .5 MG/3ML; MG/3ML
3 SOLUTION RESPIRATORY (INHALATION) EVERY 4 HOURS PRN
Status: DISCONTINUED | OUTPATIENT
Start: 2023-01-01 | End: 2023-01-01 | Stop reason: HOSPADM

## 2023-01-01 RX ORDER — HYDRALAZINE HYDROCHLORIDE 20 MG/ML
10 INJECTION INTRAMUSCULAR; INTRAVENOUS EVERY 6 HOURS PRN
Status: DISCONTINUED | OUTPATIENT
Start: 2023-01-01 | End: 2023-01-01 | Stop reason: HOSPADM

## 2023-01-01 RX ORDER — MEGESTROL ACETATE 40 MG/ML
200 SUSPENSION ORAL DAILY
Status: DISCONTINUED | OUTPATIENT
Start: 2023-01-01 | End: 2023-01-01 | Stop reason: HOSPADM

## 2023-01-01 RX ORDER — POLYETHYLENE GLYCOL 3350 17 G/17G
17 POWDER, FOR SOLUTION ORAL
Status: DISCONTINUED | OUTPATIENT
Start: 2023-01-01 | End: 2023-01-01 | Stop reason: HOSPADM

## 2023-01-01 RX ORDER — LOSARTAN POTASSIUM 25 MG/1
50 TABLET ORAL DAILY
Status: DISCONTINUED | OUTPATIENT
Start: 2023-01-01 | End: 2023-01-01

## 2023-01-01 RX ORDER — GABAPENTIN 300 MG/1
300 CAPSULE ORAL 3 TIMES DAILY PRN
Status: DISCONTINUED | OUTPATIENT
Start: 2023-01-01 | End: 2023-01-01 | Stop reason: HOSPADM

## 2023-01-01 RX ORDER — PREDNISONE 20 MG/1
20 TABLET ORAL DAILY
Qty: 30 TABLET | Refills: 5 | Status: SHIPPED | OUTPATIENT
Start: 2023-01-01 | End: 2023-01-01

## 2023-01-01 RX ORDER — GABAPENTIN 300 MG/1
CAPSULE ORAL
COMMUNITY
End: 2023-01-01 | Stop reason: SDUPTHER

## 2023-01-01 RX ORDER — MUPIROCIN 20 MG/G
OINTMENT TOPICAL 2 TIMES DAILY
Status: DISCONTINUED | OUTPATIENT
Start: 2023-01-01 | End: 2023-01-01

## 2023-01-01 RX ORDER — MEGESTROL ACETATE 40 MG/ML
200 SUSPENSION ORAL DAILY
Qty: 150 ML | Refills: 0 | Status: SHIPPED | OUTPATIENT
Start: 2023-01-01 | End: 2024-01-01

## 2023-01-01 RX ORDER — ONDANSETRON HYDROCHLORIDE 2 MG/ML
4 INJECTION, SOLUTION INTRAVENOUS EVERY 8 HOURS PRN
Status: CANCELLED | OUTPATIENT
Start: 2023-01-01

## 2023-01-01 RX ORDER — POTASSIUM CHLORIDE 7.45 MG/ML
30 INJECTION INTRAVENOUS ONCE
Status: COMPLETED | OUTPATIENT
Start: 2023-01-01 | End: 2023-01-01

## 2023-01-01 RX ORDER — METOPROLOL TARTRATE 25 MG/1
25 TABLET, FILM COATED ORAL 2 TIMES DAILY
Qty: 60 TABLET | Refills: 11 | Status: SHIPPED | OUTPATIENT
Start: 2023-01-01 | End: 2024-12-28

## 2023-01-01 RX ORDER — DEXTROSE MONOHYDRATE, SODIUM CHLORIDE, AND POTASSIUM CHLORIDE 50; 1.49; 4.5 G/1000ML; G/1000ML; G/1000ML
INJECTION, SOLUTION INTRAVENOUS CONTINUOUS
Status: DISCONTINUED | OUTPATIENT
Start: 2023-01-01 | End: 2023-01-01

## 2023-01-01 RX ORDER — ATENOLOL 25 MG/1
25 TABLET ORAL DAILY
Status: DISCONTINUED | OUTPATIENT
Start: 2023-01-01 | End: 2023-01-01 | Stop reason: HOSPADM

## 2023-01-01 RX ORDER — MUPIROCIN 20 MG/G
OINTMENT TOPICAL
Status: COMPLETED
Start: 2023-01-01 | End: 2023-01-01

## 2023-01-01 RX ORDER — BUMETANIDE 0.25 MG/ML
1 INJECTION INTRAMUSCULAR; INTRAVENOUS EVERY 12 HOURS
Status: DISPENSED | OUTPATIENT
Start: 2023-01-01 | End: 2023-01-01

## 2023-01-01 RX ORDER — GABAPENTIN 300 MG/1
300 CAPSULE ORAL 3 TIMES DAILY
Status: DISCONTINUED | OUTPATIENT
Start: 2023-01-01 | End: 2023-01-01

## 2023-01-01 RX ORDER — TALC
6 POWDER (GRAM) TOPICAL NIGHTLY PRN
Status: CANCELLED | OUTPATIENT
Start: 2023-01-01

## 2023-01-01 RX ORDER — HYDROMORPHONE HYDROCHLORIDE 2 MG/ML
0.5 INJECTION, SOLUTION INTRAMUSCULAR; INTRAVENOUS; SUBCUTANEOUS EVERY 6 HOURS PRN
Status: DISCONTINUED | OUTPATIENT
Start: 2023-01-01 | End: 2023-01-01 | Stop reason: HOSPADM

## 2023-01-01 RX ORDER — ONDANSETRON 2 MG/ML
4 INJECTION INTRAMUSCULAR; INTRAVENOUS ONCE
Status: DISCONTINUED | OUTPATIENT
Start: 2023-01-01 | End: 2023-01-01 | Stop reason: HOSPADM

## 2023-01-01 RX ORDER — SUCRALFATE 1 G/1
1 TABLET ORAL
Status: DISCONTINUED | OUTPATIENT
Start: 2023-01-01 | End: 2023-01-01 | Stop reason: HOSPADM

## 2023-01-01 RX ORDER — SODIUM CHLORIDE 0.9 % (FLUSH) 0.9 %
10 SYRINGE (ML) INJECTION
Status: CANCELLED | OUTPATIENT
Start: 2023-01-01

## 2023-01-01 RX ORDER — SODIUM CHLORIDE 0.9 % (FLUSH) 0.9 %
10 SYRINGE (ML) INJECTION
Status: DISCONTINUED | OUTPATIENT
Start: 2023-01-01 | End: 2023-01-01 | Stop reason: HOSPADM

## 2023-01-01 RX ORDER — FAMOTIDINE 10 MG/ML
20 INJECTION INTRAVENOUS DAILY
Status: DISCONTINUED | OUTPATIENT
Start: 2023-01-01 | End: 2023-01-01

## 2023-01-01 RX ORDER — MUPIROCIN 20 MG/G
OINTMENT TOPICAL 2 TIMES DAILY
Qty: 30 G | Refills: 0 | Status: ON HOLD | OUTPATIENT
Start: 2023-01-01 | End: 2023-01-01

## 2023-01-01 RX ORDER — LIDOCAINE HYDROCHLORIDE 20 MG/ML
6 INJECTION, SOLUTION INFILTRATION; PERINEURAL
Status: COMPLETED | OUTPATIENT
Start: 2023-01-01 | End: 2023-01-01

## 2023-01-01 RX ORDER — METRONIDAZOLE 500 MG/100ML
500 INJECTION, SOLUTION INTRAVENOUS
Status: DISCONTINUED | OUTPATIENT
Start: 2023-01-01 | End: 2023-01-01

## 2023-01-01 RX ORDER — ENOXAPARIN SODIUM 100 MG/ML
80 INJECTION SUBCUTANEOUS EVERY 12 HOURS
Status: DISCONTINUED | OUTPATIENT
Start: 2023-01-01 | End: 2023-01-01

## 2023-01-01 RX ORDER — ATENOLOL 25 MG/1
25 TABLET ORAL DAILY
Status: DISCONTINUED | OUTPATIENT
Start: 2023-01-01 | End: 2023-01-01

## 2023-01-01 RX ORDER — LIDOCAINE HYDROCHLORIDE 10 MG/ML
1 INJECTION, SOLUTION EPIDURAL; INFILTRATION; INTRACAUDAL; PERINEURAL ONCE
Status: CANCELLED | OUTPATIENT
Start: 2023-01-01 | End: 2023-01-01

## 2023-01-01 RX ORDER — ENOXAPARIN SODIUM 100 MG/ML
40 INJECTION SUBCUTANEOUS EVERY 12 HOURS
Status: DISCONTINUED | OUTPATIENT
Start: 2023-01-01 | End: 2023-01-01

## 2023-01-01 RX ORDER — MEGESTROL ACETATE 40 MG/ML
200 SUSPENSION ORAL DAILY
Status: CANCELLED | OUTPATIENT
Start: 2023-01-01

## 2023-01-01 RX ORDER — FUROSEMIDE 10 MG/ML
40 INJECTION INTRAMUSCULAR; INTRAVENOUS
Status: COMPLETED | OUTPATIENT
Start: 2023-01-01 | End: 2023-01-01

## 2023-01-01 RX ORDER — METOPROLOL TARTRATE 25 MG/1
25 TABLET, FILM COATED ORAL 2 TIMES DAILY
Qty: 60 TABLET | Refills: 0 | Status: ON HOLD | OUTPATIENT
Start: 2023-01-01 | End: 2023-01-01 | Stop reason: HOSPADM

## 2023-01-01 RX ORDER — GABAPENTIN 300 MG/1
300 CAPSULE ORAL 3 TIMES DAILY
Qty: 90 CAPSULE | Refills: 2 | Status: ON HOLD | OUTPATIENT
Start: 2023-01-01 | End: 2023-01-01 | Stop reason: HOSPADM

## 2023-01-01 RX ORDER — MULTIVITAMIN
1 TABLET ORAL DAILY
COMMUNITY
Start: 2023-01-01

## 2023-01-01 RX ORDER — ENOXAPARIN SODIUM 100 MG/ML
30 INJECTION SUBCUTANEOUS EVERY 24 HOURS
Status: DISCONTINUED | OUTPATIENT
Start: 2023-01-01 | End: 2023-01-01 | Stop reason: HOSPADM

## 2023-01-01 RX ORDER — MORPHINE SULFATE 2 MG/ML
2 INJECTION, SOLUTION INTRAMUSCULAR; INTRAVENOUS
Status: COMPLETED | OUTPATIENT
Start: 2023-01-01 | End: 2023-01-01

## 2023-01-01 RX ORDER — SOTALOL HYDROCHLORIDE 80 MG/1
120 TABLET ORAL 2 TIMES DAILY
Qty: 90 TABLET | Refills: 11
Start: 2023-01-01 | End: 2023-01-01

## 2023-01-01 RX ORDER — MORPHINE SULFATE 2 MG/ML
2 INJECTION, SOLUTION INTRAMUSCULAR; INTRAVENOUS EVERY 4 HOURS PRN
Status: DISCONTINUED | OUTPATIENT
Start: 2023-01-01 | End: 2023-01-01

## 2023-01-01 RX ORDER — LOSARTAN POTASSIUM 25 MG/1
50 TABLET ORAL DAILY
Status: DISCONTINUED | OUTPATIENT
Start: 2023-01-01 | End: 2023-01-01 | Stop reason: HOSPADM

## 2023-01-01 RX ORDER — GABAPENTIN 300 MG/1
300 CAPSULE ORAL 3 TIMES DAILY
Status: DISCONTINUED | OUTPATIENT
Start: 2023-01-01 | End: 2023-01-01 | Stop reason: HOSPADM

## 2023-01-01 RX ORDER — POTASSIUM CHLORIDE 750 MG/1
20 TABLET, EXTENDED RELEASE ORAL ONCE
Status: COMPLETED | OUTPATIENT
Start: 2023-01-01 | End: 2023-01-01

## 2023-01-01 RX ORDER — ONDANSETRON 4 MG/1
4 TABLET, ORALLY DISINTEGRATING ORAL
Status: COMPLETED | OUTPATIENT
Start: 2023-01-01 | End: 2023-01-01

## 2023-01-01 RX ORDER — SODIUM CHLORIDE 9 MG/ML
INJECTION, SOLUTION INTRAVENOUS ONCE
Status: DISCONTINUED | OUTPATIENT
Start: 2023-01-01 | End: 2023-01-01

## 2023-01-01 RX ORDER — HYDROCODONE BITARTRATE AND ACETAMINOPHEN 500; 5 MG/1; MG/1
TABLET ORAL
Status: DISCONTINUED | OUTPATIENT
Start: 2023-01-01 | End: 2023-01-01 | Stop reason: HOSPADM

## 2023-01-01 RX ORDER — HYDRALAZINE HYDROCHLORIDE 20 MG/ML
10 INJECTION INTRAMUSCULAR; INTRAVENOUS EVERY 4 HOURS PRN
Status: DISCONTINUED | OUTPATIENT
Start: 2023-01-01 | End: 2023-01-01 | Stop reason: HOSPADM

## 2023-01-01 RX ORDER — LIDOCAINE HYDROCHLORIDE 10 MG/ML
1 INJECTION, SOLUTION EPIDURAL; INFILTRATION; INTRACAUDAL; PERINEURAL ONCE
Status: DISCONTINUED | OUTPATIENT
Start: 2023-01-01 | End: 2023-01-01 | Stop reason: HOSPADM

## 2023-01-01 RX ORDER — MELOXICAM 15 MG/1
TABLET ORAL
COMMUNITY
End: 2023-01-01

## 2023-01-01 RX ORDER — SODIUM CHLORIDE 9 MG/ML
INJECTION, SOLUTION INTRAVENOUS CONTINUOUS
Status: DISCONTINUED | OUTPATIENT
Start: 2023-01-01 | End: 2023-01-01 | Stop reason: HOSPADM

## 2023-01-01 RX ORDER — SODIUM CHLORIDE 9 MG/ML
1000 INJECTION, SOLUTION INTRAVENOUS
Status: DISCONTINUED | OUTPATIENT
Start: 2023-01-01 | End: 2023-01-01

## 2023-01-01 RX ORDER — ALBUMIN HUMAN 250 G/1000ML
SOLUTION INTRAVENOUS
Status: COMPLETED
Start: 2023-01-01 | End: 2023-01-01

## 2023-01-01 RX ORDER — HYDROCORTISONE 20 MG/1
20 TABLET ORAL 2 TIMES DAILY
Qty: 20 TABLET | Refills: 0 | Status: SHIPPED | OUTPATIENT
Start: 2023-01-01 | End: 2023-01-01

## 2023-01-01 RX ORDER — BUMETANIDE 0.25 MG/ML
1 INJECTION INTRAMUSCULAR; INTRAVENOUS
Status: DISCONTINUED | OUTPATIENT
Start: 2023-01-01 | End: 2023-01-01

## 2023-01-01 RX ORDER — METOPROLOL TARTRATE 1 MG/ML
5 INJECTION, SOLUTION INTRAVENOUS
Status: CANCELLED | OUTPATIENT
Start: 2023-01-01

## 2023-01-01 RX ORDER — METOPROLOL TARTRATE 25 MG/1
12.5 TABLET ORAL 2 TIMES DAILY
Status: DISCONTINUED | OUTPATIENT
Start: 2023-01-01 | End: 2023-01-01

## 2023-01-01 RX ORDER — FUROSEMIDE 10 MG/ML
60 INJECTION INTRAMUSCULAR; INTRAVENOUS
Status: COMPLETED | OUTPATIENT
Start: 2023-01-01 | End: 2023-01-01

## 2023-01-01 RX ORDER — SODIUM CITRATE AND CITRIC ACID MONOHYDRATE 334; 500 MG/5ML; MG/5ML
30 SOLUTION ORAL
Status: DISCONTINUED | OUTPATIENT
Start: 2023-01-01 | End: 2023-01-01 | Stop reason: HOSPADM

## 2023-01-01 RX ORDER — IPRATROPIUM BROMIDE AND ALBUTEROL SULFATE 2.5; .5 MG/3ML; MG/3ML
3 SOLUTION RESPIRATORY (INHALATION) ONCE
Status: COMPLETED | OUTPATIENT
Start: 2023-01-01 | End: 2023-01-01

## 2023-01-01 RX ORDER — SODIUM CHLORIDE, SODIUM GLUCONATE, SODIUM ACETATE, POTASSIUM CHLORIDE AND MAGNESIUM CHLORIDE 30; 37; 368; 526; 502 MG/100ML; MG/100ML; MG/100ML; MG/100ML; MG/100ML
INJECTION, SOLUTION INTRAVENOUS CONTINUOUS
Status: CANCELLED | OUTPATIENT
Start: 2023-01-01 | End: 2023-01-01

## 2023-01-01 RX ORDER — HYDROCORTISONE 10 MG/1
20 TABLET ORAL 2 TIMES DAILY
Status: DISCONTINUED | OUTPATIENT
Start: 2023-01-01 | End: 2023-01-01 | Stop reason: HOSPADM

## 2023-01-01 RX ORDER — METOPROLOL TARTRATE 1 MG/ML
5 INJECTION, SOLUTION INTRAVENOUS EVERY 6 HOURS
Status: DISCONTINUED | OUTPATIENT
Start: 2023-01-01 | End: 2023-01-01

## 2023-01-01 RX ORDER — ENOXAPARIN SODIUM 100 MG/ML
1 INJECTION SUBCUTANEOUS ONCE
Status: COMPLETED | OUTPATIENT
Start: 2023-01-01 | End: 2023-01-01

## 2023-01-01 RX ORDER — GLUCAGON 1 MG
1 KIT INJECTION ONCE
Status: COMPLETED | OUTPATIENT
Start: 2023-01-01 | End: 2023-01-01

## 2023-01-01 RX ORDER — ATROPINE SULFATE 0.4 MG/ML
INJECTION, SOLUTION ENDOTRACHEAL; INTRAMEDULLARY; INTRAMUSCULAR; INTRAVENOUS; SUBCUTANEOUS
Status: DISCONTINUED | OUTPATIENT
Start: 2023-01-01 | End: 2023-01-01

## 2023-01-01 RX ORDER — CHOLESTYRAMINE 4 G/4.8G
1 POWDER, FOR SUSPENSION ORAL 2 TIMES DAILY
COMMUNITY
Start: 2023-01-01 | End: 2023-01-01

## 2023-01-01 RX ORDER — METOCLOPRAMIDE HYDROCHLORIDE 5 MG/ML
10 INJECTION INTRAMUSCULAR; INTRAVENOUS EVERY 10 MIN PRN
Status: DISCONTINUED | OUTPATIENT
Start: 2023-01-01 | End: 2023-01-01 | Stop reason: HOSPADM

## 2023-01-01 RX ORDER — POTASSIUM CHLORIDE 7.45 MG/ML
20 INJECTION INTRAVENOUS ONCE
Status: DISCONTINUED | OUTPATIENT
Start: 2023-01-01 | End: 2023-01-01

## 2023-01-01 RX ADMIN — PIPERACILLIN AND TAZOBACTAM 4.5 G: 4; .5 INJECTION, POWDER, LYOPHILIZED, FOR SOLUTION INTRAVENOUS; PARENTERAL at 01:06

## 2023-01-01 RX ADMIN — IPRATROPIUM BROMIDE AND ALBUTEROL SULFATE 3 ML: 2.5; .5 SOLUTION RESPIRATORY (INHALATION) at 06:07

## 2023-01-01 RX ADMIN — ALBUMIN HUMAN 50 G: 250 SOLUTION INTRAVENOUS at 11:12

## 2023-01-01 RX ADMIN — METHYLPREDNISOLONE SODIUM SUCCINATE 20 MG: 40 INJECTION, POWDER, FOR SOLUTION INTRAMUSCULAR; INTRAVENOUS at 04:06

## 2023-01-01 RX ADMIN — MUPIROCIN 1 G: 20 OINTMENT TOPICAL at 08:12

## 2023-01-01 RX ADMIN — HYDROCORTISONE 20 MG: 10 TABLET ORAL at 08:07

## 2023-01-01 RX ADMIN — METHYLPREDNISOLONE SODIUM SUCCINATE 20 MG: 40 INJECTION, POWDER, FOR SOLUTION INTRAMUSCULAR; INTRAVENOUS at 08:06

## 2023-01-01 RX ADMIN — SODIUM CHLORIDE: 9 INJECTION, SOLUTION INTRAVENOUS at 07:06

## 2023-01-01 RX ADMIN — ENOXAPARIN SODIUM 40 MG: 40 INJECTION SUBCUTANEOUS at 10:06

## 2023-01-01 RX ADMIN — GABAPENTIN 300 MG: 300 CAPSULE ORAL at 08:07

## 2023-01-01 RX ADMIN — ALBUMIN HUMAN 50 G: 250 SOLUTION INTRAVENOUS at 08:12

## 2023-01-01 RX ADMIN — METRONIDAZOLE 500 MG: 500 INJECTION, SOLUTION INTRAVENOUS at 04:06

## 2023-01-01 RX ADMIN — CEFTRIAXONE SODIUM 1 G: 1 INJECTION, POWDER, FOR SOLUTION INTRAMUSCULAR; INTRAVENOUS at 05:12

## 2023-01-01 RX ADMIN — FAMOTIDINE 20 MG: 10 INJECTION, SOLUTION INTRAVENOUS at 09:06

## 2023-01-01 RX ADMIN — PIPERACILLIN AND TAZOBACTAM 4.5 G: 4; .5 INJECTION, POWDER, LYOPHILIZED, FOR SOLUTION INTRAVENOUS; PARENTERAL at 05:12

## 2023-01-01 RX ADMIN — SODIUM CHLORIDE 1000 ML: 9 INJECTION, SOLUTION INTRAVENOUS at 09:07

## 2023-01-01 RX ADMIN — Medication 1 CAPSULE: at 05:06

## 2023-01-01 RX ADMIN — ENOXAPARIN SODIUM 80 MG: 80 INJECTION SUBCUTANEOUS at 09:07

## 2023-01-01 RX ADMIN — POTASSIUM CHLORIDE 20 MEQ: 750 TABLET, EXTENDED RELEASE ORAL at 11:12

## 2023-01-01 RX ADMIN — APIXABAN 5 MG: 5 TABLET, FILM COATED ORAL at 09:07

## 2023-01-01 RX ADMIN — ENOXAPARIN SODIUM 80 MG: 100 INJECTION SUBCUTANEOUS at 04:06

## 2023-01-01 RX ADMIN — ENOXAPARIN SODIUM 40 MG: 40 INJECTION SUBCUTANEOUS at 08:06

## 2023-01-01 RX ADMIN — SUCRALFATE 1 G: 1 TABLET ORAL at 05:12

## 2023-01-01 RX ADMIN — POTASSIUM CHLORIDE, DEXTROSE MONOHYDRATE AND SODIUM CHLORIDE: 150; 5; 450 INJECTION, SOLUTION INTRAVENOUS at 10:06

## 2023-01-01 RX ADMIN — SUCRALFATE 1 G: 1 TABLET ORAL at 08:12

## 2023-01-01 RX ADMIN — PIPERACILLIN AND TAZOBACTAM 4.5 G: 4; .5 INJECTION, POWDER, LYOPHILIZED, FOR SOLUTION INTRAVENOUS; PARENTERAL at 06:06

## 2023-01-01 RX ADMIN — GABAPENTIN 300 MG: 300 CAPSULE ORAL at 08:06

## 2023-01-01 RX ADMIN — SUCRALFATE 1 G: 1 TABLET ORAL at 10:06

## 2023-01-01 RX ADMIN — MUPIROCIN: 20 OINTMENT TOPICAL at 09:12

## 2023-01-01 RX ADMIN — SUCRALFATE 1 G: 1 TABLET ORAL at 03:06

## 2023-01-01 RX ADMIN — SUCRALFATE 1 G: 1 TABLET ORAL at 09:12

## 2023-01-01 RX ADMIN — LIDOCAINE HYDROCHLORIDE 6 ML: 20 INJECTION, SOLUTION INFILTRATION; PERINEURAL at 09:05

## 2023-01-01 RX ADMIN — FAMOTIDINE 20 MG: 20 TABLET, FILM COATED ORAL at 08:07

## 2023-01-01 RX ADMIN — METOPROLOL TARTRATE 25 MG: 25 TABLET, FILM COATED ORAL at 09:12

## 2023-01-01 RX ADMIN — ENOXAPARIN SODIUM 40 MG: 40 INJECTION SUBCUTANEOUS at 05:12

## 2023-01-01 RX ADMIN — IPRATROPIUM BROMIDE AND ALBUTEROL SULFATE 3 ML: 2.5; .5 SOLUTION RESPIRATORY (INHALATION) at 06:06

## 2023-01-01 RX ADMIN — MULTIPLE VITAMINS W/ MINERALS TAB 1 TABLET: TAB at 08:12

## 2023-01-01 RX ADMIN — ATENOLOL 25 MG: 25 TABLET ORAL at 11:07

## 2023-01-01 RX ADMIN — METRONIDAZOLE 500 MG: 500 INJECTION, SOLUTION INTRAVENOUS at 12:06

## 2023-01-01 RX ADMIN — METOPROLOL TARTRATE 5 MG: 1 INJECTION, SOLUTION INTRAVENOUS at 06:06

## 2023-01-01 RX ADMIN — METOPROLOL TARTRATE 5 MG: 1 INJECTION, SOLUTION INTRAVENOUS at 12:06

## 2023-01-01 RX ADMIN — MORPHINE SULFATE 2 MG: 2 INJECTION, SOLUTION INTRAMUSCULAR; INTRAVENOUS at 02:06

## 2023-01-01 RX ADMIN — METHYLPREDNISOLONE SODIUM SUCCINATE 20 MG: 40 INJECTION, POWDER, FOR SOLUTION INTRAMUSCULAR; INTRAVENOUS at 05:06

## 2023-01-01 RX ADMIN — FAMOTIDINE 20 MG: 10 INJECTION, SOLUTION INTRAVENOUS at 08:06

## 2023-01-01 RX ADMIN — IPRATROPIUM BROMIDE AND ALBUTEROL SULFATE 3 ML: 2.5; .5 SOLUTION RESPIRATORY (INHALATION) at 12:06

## 2023-01-01 RX ADMIN — SUCRALFATE 1 G: 1 TABLET ORAL at 04:12

## 2023-01-01 RX ADMIN — ENOXAPARIN SODIUM 30 MG: 30 INJECTION SUBCUTANEOUS at 05:12

## 2023-01-01 RX ADMIN — SODIUM CHLORIDE: 9 INJECTION, SOLUTION INTRAVENOUS at 03:06

## 2023-01-01 RX ADMIN — SUCRALFATE 1 G: 1 TABLET ORAL at 12:12

## 2023-01-01 RX ADMIN — PIPERACILLIN AND TAZOBACTAM 4.5 G: 4; .5 INJECTION, POWDER, LYOPHILIZED, FOR SOLUTION INTRAVENOUS; PARENTERAL at 08:06

## 2023-01-01 RX ADMIN — ATENOLOL 25 MG: 25 TABLET ORAL at 08:06

## 2023-01-01 RX ADMIN — METOPROLOL TARTRATE 5 MG: 1 INJECTION, SOLUTION INTRAVENOUS at 11:06

## 2023-01-01 RX ADMIN — LOSARTAN POTASSIUM 50 MG: 25 TABLET, FILM COATED ORAL at 08:06

## 2023-01-01 RX ADMIN — Medication 10 ML: at 08:06

## 2023-01-01 RX ADMIN — MEGESTROL ACETATE 200 MG: 400 SUSPENSION ORAL at 08:12

## 2023-01-01 RX ADMIN — METRONIDAZOLE 500 MG: 5 INJECTION, SOLUTION INTRAVENOUS at 09:06

## 2023-01-01 RX ADMIN — POTASSIUM CHLORIDE 40 MEQ: 750 TABLET, EXTENDED RELEASE ORAL at 03:12

## 2023-01-01 RX ADMIN — IPRATROPIUM BROMIDE AND ALBUTEROL SULFATE 3 ML: 2.5; .5 SOLUTION RESPIRATORY (INHALATION) at 05:06

## 2023-01-01 RX ADMIN — CEFTRIAXONE SODIUM 1 G: 1 INJECTION, POWDER, FOR SOLUTION INTRAMUSCULAR; INTRAVENOUS at 04:06

## 2023-01-01 RX ADMIN — IPRATROPIUM BROMIDE AND ALBUTEROL SULFATE 3 ML: 2.5; .5 SOLUTION RESPIRATORY (INHALATION) at 12:07

## 2023-01-01 RX ADMIN — SUCRALFATE 1 G: 1 TABLET ORAL at 11:12

## 2023-01-01 RX ADMIN — ACETAMINOPHEN 650 MG: 325 TABLET, FILM COATED ORAL at 01:12

## 2023-01-01 RX ADMIN — ALBUMIN (HUMAN) 12.5 G: 5 SOLUTION INTRAVENOUS at 05:12

## 2023-01-01 RX ADMIN — SUCRALFATE 1 G: 1 TABLET ORAL at 06:12

## 2023-01-01 RX ADMIN — LEUCINE, PHENYLALANINE, LYSINE, METHIONINE, ISOLEUCINE, VALINE, HISTIDINE, THREONINE, TRYPTOPHAN, ALANINE, GLYCINE, ARGININE, PROLINE, SERINE, TYROSINE, SODIUM ACETATE, DIBASIC POTASSIUM PHOSPHATE, MAGNESIUM CHLORIDE, SODIUM CHLORIDE, CALCIUM CHLORIDE, DEXTROSE
311; 238; 247; 170; 255; 247; 204; 179; 77; 880; 438; 489; 289; 213; 17; 297; 261; 51; 77; 33; 5 INJECTION INTRAVENOUS at 04:12

## 2023-01-01 RX ADMIN — HYDROMORPHONE HYDROCHLORIDE 0.5 MG: 2 INJECTION, SOLUTION INTRAMUSCULAR; INTRAVENOUS; SUBCUTANEOUS at 11:06

## 2023-01-01 RX ADMIN — MORPHINE SULFATE 2 MG: 2 INJECTION, SOLUTION INTRAMUSCULAR; INTRAVENOUS at 01:06

## 2023-01-01 RX ADMIN — LIDOCAINE HYDROCHLORIDE 4 ML: 20 INJECTION, SOLUTION EPIDURAL; INFILTRATION; INTRACAUDAL; PERINEURAL at 07:09

## 2023-01-01 RX ADMIN — METRONIDAZOLE 500 MG: 5 INJECTION, SOLUTION INTRAVENOUS at 02:06

## 2023-01-01 RX ADMIN — LOSARTAN POTASSIUM 50 MG: 25 TABLET, FILM COATED ORAL at 09:07

## 2023-01-01 RX ADMIN — SODIUM CHLORIDE 1000 ML: 9 INJECTION, SOLUTION INTRAVENOUS at 12:06

## 2023-01-01 RX ADMIN — SODIUM CHLORIDE, SODIUM GLUCONATE, SODIUM ACETATE, POTASSIUM CHLORIDE AND MAGNESIUM CHLORIDE: 526; 502; 368; 37; 30 INJECTION, SOLUTION INTRAVENOUS at 07:09

## 2023-01-01 RX ADMIN — METRONIDAZOLE 500 MG: 500 INJECTION, SOLUTION INTRAVENOUS at 11:06

## 2023-01-01 RX ADMIN — METOPROLOL TARTRATE 5 MG: 1 INJECTION, SOLUTION INTRAVENOUS at 01:06

## 2023-01-01 RX ADMIN — SODIUM CHLORIDE 250 ML: 9 INJECTION, SOLUTION INTRAVENOUS at 09:06

## 2023-01-01 RX ADMIN — METOPROLOL TARTRATE 25 MG: 25 TABLET, FILM COATED ORAL at 08:12

## 2023-01-01 RX ADMIN — PIPERACILLIN AND TAZOBACTAM 4.5 G: 4; .5 INJECTION, POWDER, LYOPHILIZED, FOR SOLUTION INTRAVENOUS; PARENTERAL at 10:06

## 2023-01-01 RX ADMIN — LEUCINE, PHENYLALANINE, LYSINE, METHIONINE, ISOLEUCINE, VALINE, HISTIDINE, THREONINE, TRYPTOPHAN, ALANINE, GLYCINE, ARGININE, PROLINE, SERINE, TYROSINE, SODIUM ACETATE, DIBASIC POTASSIUM PHOSPHATE, MAGNESIUM CHLORIDE, SODIUM CHLORIDE, CALCIUM CHLORIDE, DEXTROSE
311; 238; 247; 170; 255; 247; 204; 179; 77; 880; 438; 489; 289; 213; 17; 297; 261; 51; 77; 33; 5 INJECTION INTRAVENOUS at 11:12

## 2023-01-01 RX ADMIN — SODIUM CHLORIDE: 9 INJECTION, SOLUTION INTRAVENOUS at 05:06

## 2023-01-01 RX ADMIN — PIPERACILLIN AND TAZOBACTAM 4.5 G: 4; .5 INJECTION, POWDER, LYOPHILIZED, FOR SOLUTION INTRAVENOUS; PARENTERAL at 05:07

## 2023-01-01 RX ADMIN — SUCRALFATE 1 G: 1 TABLET ORAL at 03:12

## 2023-01-01 RX ADMIN — ATROPINE SULFATE 0.4 MG: 0.4 INJECTION, SOLUTION INTRAMUSCULAR; INTRAVENOUS; SUBCUTANEOUS at 11:06

## 2023-01-01 RX ADMIN — IPRATROPIUM BROMIDE AND ALBUTEROL SULFATE 3 ML: 2.5; .5 SOLUTION RESPIRATORY (INHALATION) at 05:07

## 2023-01-01 RX ADMIN — HYDROMORPHONE HYDROCHLORIDE 1 MG: 2 INJECTION, SOLUTION INTRAMUSCULAR; INTRAVENOUS; SUBCUTANEOUS at 04:06

## 2023-01-01 RX ADMIN — HYDROCORTISONE 20 MG: 10 TABLET ORAL at 11:07

## 2023-01-01 RX ADMIN — ATENOLOL 25 MG: 25 TABLET ORAL at 05:06

## 2023-01-01 RX ADMIN — MAGNESIUM SULFATE IN DEXTROSE 1 G: 10 INJECTION, SOLUTION INTRAVENOUS at 08:12

## 2023-01-01 RX ADMIN — SODIUM CHLORIDE 500 ML: 9 INJECTION, SOLUTION INTRAVENOUS at 01:06

## 2023-01-01 RX ADMIN — FUROSEMIDE 60 MG: 10 INJECTION, SOLUTION INTRAMUSCULAR; INTRAVENOUS at 04:12

## 2023-01-01 RX ADMIN — SUCRALFATE 1 G: 1 TABLET ORAL at 05:06

## 2023-01-01 RX ADMIN — POTASSIUM CHLORIDE, DEXTROSE MONOHYDRATE AND SODIUM CHLORIDE: 150; 5; 450 INJECTION, SOLUTION INTRAVENOUS at 03:06

## 2023-01-01 RX ADMIN — SODIUM CHLORIDE 1000 ML: 9 INJECTION, SOLUTION INTRAVENOUS at 03:06

## 2023-01-01 RX ADMIN — SODIUM CHLORIDE 500 ML: 9 INJECTION, SOLUTION INTRAVENOUS at 09:12

## 2023-01-01 RX ADMIN — FAMOTIDINE 20 MG: 10 INJECTION, SOLUTION INTRAVENOUS at 10:06

## 2023-01-01 RX ADMIN — MEGESTROL ACETATE 200 MG: 400 SUSPENSION ORAL at 03:12

## 2023-01-01 RX ADMIN — MUPIROCIN 1 G: 20 OINTMENT TOPICAL at 09:12

## 2023-01-01 RX ADMIN — PIPERACILLIN AND TAZOBACTAM 4.5 G: 4; .5 INJECTION, POWDER, LYOPHILIZED, FOR SOLUTION INTRAVENOUS; PARENTERAL at 02:06

## 2023-01-01 RX ADMIN — PROPOFOL 50 MG: 10 INJECTION, EMULSION INTRAVENOUS at 07:09

## 2023-01-01 RX ADMIN — HYDROCORTISONE 20 MG: 10 TABLET ORAL at 09:07

## 2023-01-01 RX ADMIN — POLYETHYLENE GLYCOL 3350 17 G: 17 POWDER, FOR SOLUTION ORAL at 08:06

## 2023-01-01 RX ADMIN — SUCRALFATE 1 G: 1 TABLET ORAL at 06:06

## 2023-01-01 RX ADMIN — Medication 6 MG: at 08:12

## 2023-01-01 RX ADMIN — METRONIDAZOLE 500 MG: 500 INJECTION, SOLUTION INTRAVENOUS at 05:06

## 2023-01-01 RX ADMIN — METHYLPREDNISOLONE SODIUM SUCCINATE 20 MG: 40 INJECTION, POWDER, FOR SOLUTION INTRAMUSCULAR; INTRAVENOUS at 05:07

## 2023-01-01 RX ADMIN — POTASSIUM CHLORIDE 20 MEQ: 750 TABLET, EXTENDED RELEASE ORAL at 02:12

## 2023-01-01 RX ADMIN — HYDROMORPHONE HYDROCHLORIDE 0.5 MG: 2 INJECTION, SOLUTION INTRAMUSCULAR; INTRAVENOUS; SUBCUTANEOUS at 07:06

## 2023-01-01 RX ADMIN — SUCRALFATE 1 G: 1 TABLET ORAL at 08:06

## 2023-01-01 RX ADMIN — ENOXAPARIN SODIUM 30 MG: 30 INJECTION SUBCUTANEOUS at 04:06

## 2023-01-01 RX ADMIN — METRONIDAZOLE 500 MG: 5 INJECTION, SOLUTION INTRAVENOUS at 05:06

## 2023-01-01 RX ADMIN — POLYETHYLENE GLYCOL 3350, SODIUM SULFATE ANHYDROUS, SODIUM BICARBONATE, SODIUM CHLORIDE, POTASSIUM CHLORIDE 4000 ML: 236; 22.74; 6.74; 5.86; 2.97 POWDER, FOR SOLUTION ORAL at 04:06

## 2023-01-01 RX ADMIN — LEUCINE, PHENYLALANINE, LYSINE, METHIONINE, ISOLEUCINE, VALINE, HISTIDINE, THREONINE, TRYPTOPHAN, ALANINE, GLYCINE, ARGININE, PROLINE, SERINE, TYROSINE, SODIUM ACETATE, DIBASIC POTASSIUM PHOSPHATE, MAGNESIUM CHLORIDE, SODIUM CHLORIDE, CALCIUM CHLORIDE, DEXTROSE
311; 238; 247; 170; 255; 247; 204; 179; 77; 880; 438; 489; 289; 213; 17; 297; 261; 51; 77; 33; 5 INJECTION INTRAVENOUS at 05:12

## 2023-01-01 RX ADMIN — SODIUM CHLORIDE: 9 INJECTION, SOLUTION INTRAVENOUS at 08:06

## 2023-01-01 RX ADMIN — ENOXAPARIN SODIUM 40 MG: 40 INJECTION SUBCUTANEOUS at 05:06

## 2023-01-01 RX ADMIN — POTASSIUM CHLORIDE 30 MEQ: 7.46 INJECTION, SOLUTION INTRAVENOUS at 02:06

## 2023-01-01 RX ADMIN — LEUCINE, PHENYLALANINE, LYSINE, METHIONINE, ISOLEUCINE, VALINE, HISTIDINE, THREONINE, TRYPTOPHAN, ALANINE, GLYCINE, ARGININE, PROLINE, SERINE, TYROSINE, SODIUM ACETATE, DIBASIC POTASSIUM PHOSPHATE, MAGNESIUM CHLORIDE, SODIUM CHLORIDE, CALCIUM CHLORIDE, DEXTROSE
311; 238; 247; 170; 255; 247; 204; 179; 77; 880; 438; 489; 289; 213; 17; 297; 261; 51; 77; 33; 5 INJECTION INTRAVENOUS at 08:06

## 2023-01-01 RX ADMIN — ALBUMIN (HUMAN) 12.5 G: 12.5 SOLUTION INTRAVENOUS at 01:12

## 2023-01-01 RX ADMIN — FAMOTIDINE 20 MG: 10 INJECTION, SOLUTION INTRAVENOUS at 09:07

## 2023-01-01 RX ADMIN — METRONIDAZOLE 500 MG: 500 INJECTION, SOLUTION INTRAVENOUS at 08:06

## 2023-01-01 RX ADMIN — POTASSIUM CHLORIDE, DEXTROSE MONOHYDRATE AND SODIUM CHLORIDE: 150; 5; 450 INJECTION, SOLUTION INTRAVENOUS at 08:06

## 2023-01-01 RX ADMIN — ENOXAPARIN SODIUM 30 MG: 30 INJECTION SUBCUTANEOUS at 04:12

## 2023-01-01 RX ADMIN — PIPERACILLIN AND TAZOBACTAM 4.5 G: 4; .5 INJECTION, POWDER, LYOPHILIZED, FOR SOLUTION INTRAVENOUS; PARENTERAL at 04:07

## 2023-01-01 RX ADMIN — LIDOCAINE HYDROCHLORIDE 20 MG: 10 INJECTION, SOLUTION EPIDURAL; INFILTRATION; INTRACAUDAL; PERINEURAL at 11:06

## 2023-01-01 RX ADMIN — METOPROLOL TARTRATE 5 MG: 1 INJECTION, SOLUTION INTRAVENOUS at 05:12

## 2023-01-01 RX ADMIN — PIPERACILLIN AND TAZOBACTAM 4.5 G: 4; .5 INJECTION, POWDER, LYOPHILIZED, FOR SOLUTION INTRAVENOUS; PARENTERAL at 03:06

## 2023-01-01 RX ADMIN — PIPERACILLIN AND TAZOBACTAM 4.5 G: 4; .5 INJECTION, POWDER, LYOPHILIZED, FOR SOLUTION INTRAVENOUS; PARENTERAL at 09:12

## 2023-01-01 RX ADMIN — SODIUM CHLORIDE: 9 INJECTION, SOLUTION INTRAVENOUS at 12:06

## 2023-01-01 RX ADMIN — GLUCAGON 1 MG: KIT at 12:12

## 2023-01-01 RX ADMIN — ATENOLOL 25 MG: 25 TABLET ORAL at 09:07

## 2023-01-01 RX ADMIN — FAMOTIDINE 20 MG: 20 TABLET, FILM COATED ORAL at 12:06

## 2023-01-01 RX ADMIN — IPRATROPIUM BROMIDE AND ALBUTEROL SULFATE 3 ML: 2.5; .5 SOLUTION RESPIRATORY (INHALATION) at 02:06

## 2023-01-01 RX ADMIN — LOSARTAN POTASSIUM 50 MG: 25 TABLET, FILM COATED ORAL at 05:06

## 2023-01-01 RX ADMIN — PIPERACILLIN AND TAZOBACTAM 4.5 G: 4; .5 INJECTION, POWDER, LYOPHILIZED, FOR SOLUTION INTRAVENOUS; PARENTERAL at 05:06

## 2023-01-01 RX ADMIN — GABAPENTIN 300 MG: 300 CAPSULE ORAL at 03:06

## 2023-01-01 RX ADMIN — FAMOTIDINE 20 MG: 20 TABLET, FILM COATED ORAL at 09:07

## 2023-01-01 RX ADMIN — IPRATROPIUM BROMIDE AND ALBUTEROL SULFATE 3 ML: 2.5; .5 SOLUTION RESPIRATORY (INHALATION) at 06:12

## 2023-01-01 RX ADMIN — MEGESTROL ACETATE 200 MG: 400 SUSPENSION ORAL at 01:12

## 2023-01-01 RX ADMIN — APIXABAN 5 MG: 5 TABLET, FILM COATED ORAL at 08:12

## 2023-01-01 RX ADMIN — PIPERACILLIN AND TAZOBACTAM 4.5 G: 4; .5 INJECTION, POWDER, LYOPHILIZED, FOR SOLUTION INTRAVENOUS; PARENTERAL at 08:12

## 2023-01-01 RX ADMIN — ENOXAPARIN SODIUM 30 MG: 30 INJECTION SUBCUTANEOUS at 05:06

## 2023-01-01 RX ADMIN — APIXABAN 5 MG: 5 TABLET, FILM COATED ORAL at 09:12

## 2023-01-01 RX ADMIN — METOPROLOL TARTRATE 5 MG: 1 INJECTION, SOLUTION INTRAVENOUS at 05:07

## 2023-01-01 RX ADMIN — PIPERACILLIN AND TAZOBACTAM 4.5 G: 4; .5 INJECTION, POWDER, LYOPHILIZED, FOR SOLUTION INTRAVENOUS; PARENTERAL at 08:07

## 2023-01-01 RX ADMIN — SODIUM CHLORIDE: 9 INJECTION, SOLUTION INTRAVENOUS at 04:06

## 2023-01-01 RX ADMIN — POTASSIUM CHLORIDE, DEXTROSE MONOHYDRATE AND SODIUM CHLORIDE: 150; 5; 450 INJECTION, SOLUTION INTRAVENOUS at 07:06

## 2023-01-01 RX ADMIN — ENOXAPARIN SODIUM 40 MG: 40 INJECTION SUBCUTANEOUS at 09:06

## 2023-01-01 RX ADMIN — LEUCINE, PHENYLALANINE, LYSINE, METHIONINE, ISOLEUCINE, VALINE, HISTIDINE, THREONINE, TRYPTOPHAN, ALANINE, GLYCINE, ARGININE, PROLINE, SERINE, TYROSINE, DEXTROSE: 311; 238; 247; 170; 255; 247; 204; 179; 77; 880; 438; 489; 289; 213; 17; 5 INJECTION INTRAVENOUS at 04:06

## 2023-01-01 RX ADMIN — METRONIDAZOLE 500 MG: 5 INJECTION, SOLUTION INTRAVENOUS at 10:06

## 2023-01-01 RX ADMIN — ONDANSETRON 4 MG: 4 TABLET, ORALLY DISINTEGRATING ORAL at 11:06

## 2023-01-01 RX ADMIN — METOPROLOL TARTRATE 5 MG: 1 INJECTION, SOLUTION INTRAVENOUS at 07:06

## 2023-01-01 RX ADMIN — APIXABAN 5 MG: 5 TABLET, FILM COATED ORAL at 10:12

## 2023-01-01 RX ADMIN — Medication 10 ML: at 09:06

## 2023-01-01 RX ADMIN — MAGNESIUM SULFATE IN DEXTROSE 1 G: 10 INJECTION, SOLUTION INTRAVENOUS at 04:06

## 2023-01-01 RX ADMIN — APIXABAN 5 MG: 5 TABLET, FILM COATED ORAL at 08:07

## 2023-01-01 RX ADMIN — PIPERACILLIN AND TAZOBACTAM 4.5 G: 4; .5 INJECTION, POWDER, LYOPHILIZED, FOR SOLUTION INTRAVENOUS; PARENTERAL at 04:06

## 2023-01-01 RX ADMIN — SUCRALFATE 1 G: 1 TABLET ORAL at 04:06

## 2023-01-01 RX ADMIN — PROPOFOL 60 MG: 10 INJECTION, EMULSION INTRAVENOUS at 07:09

## 2023-01-01 RX ADMIN — BUMETANIDE 1 MG: 0.25 INJECTION INTRAMUSCULAR; INTRAVENOUS at 01:12

## 2023-01-01 RX ADMIN — FUROSEMIDE 40 MG: 10 INJECTION, SOLUTION INTRAMUSCULAR; INTRAVENOUS at 06:12

## 2023-01-01 RX ADMIN — GABAPENTIN 300 MG: 300 CAPSULE ORAL at 02:07

## 2023-01-01 RX ADMIN — POTASSIUM CHLORIDE 30 MEQ: 7.46 INJECTION, SOLUTION INTRAVENOUS at 12:06

## 2023-01-01 RX ADMIN — ENOXAPARIN SODIUM 40 MG: 40 INJECTION SUBCUTANEOUS at 12:06

## 2023-01-01 RX ADMIN — METOPROLOL TARTRATE 25 MG: 25 TABLET, FILM COATED ORAL at 10:12

## 2023-01-01 RX ADMIN — ENOXAPARIN SODIUM 30 MG: 30 INJECTION SUBCUTANEOUS at 08:12

## 2023-01-01 RX ADMIN — GABAPENTIN 300 MG: 300 CAPSULE ORAL at 02:06

## 2023-01-01 RX ADMIN — MULTIPLE VITAMINS W/ MINERALS TAB 1 TABLET: TAB at 01:12

## 2023-01-01 RX ADMIN — PIPERACILLIN AND TAZOBACTAM 4.5 G: 4; .5 INJECTION, POWDER, LYOPHILIZED, FOR SOLUTION INTRAVENOUS; PARENTERAL at 02:07

## 2023-01-01 RX ADMIN — ONDANSETRON HYDROCHLORIDE 4 MG: 2 SOLUTION INTRAMUSCULAR; INTRAVENOUS at 07:06

## 2023-01-01 RX ADMIN — POTASSIUM CHLORIDE, DEXTROSE MONOHYDRATE AND SODIUM CHLORIDE: 150; 5; 450 INJECTION, SOLUTION INTRAVENOUS at 11:06

## 2023-01-01 RX ADMIN — METOPROLOL TARTRATE 5 MG: 1 INJECTION, SOLUTION INTRAVENOUS at 05:06

## 2023-01-01 RX ADMIN — PIPERACILLIN AND TAZOBACTAM 4.5 G: 4; .5 INJECTION, POWDER, LYOPHILIZED, FOR SOLUTION INTRAVENOUS; PARENTERAL at 09:06

## 2023-01-01 RX ADMIN — METOPROLOL TARTRATE 5 MG: 1 INJECTION, SOLUTION INTRAVENOUS at 11:12

## 2023-01-01 RX ADMIN — SODIUM CHLORIDE: 9 INJECTION, SOLUTION INTRAVENOUS at 11:06

## 2023-01-01 RX ADMIN — PIPERACILLIN AND TAZOBACTAM 4.5 G: 4; .5 INJECTION, POWDER, LYOPHILIZED, FOR SOLUTION INTRAVENOUS; PARENTERAL at 01:12

## 2023-01-01 RX ADMIN — METRONIDAZOLE 500 MG: 5 INJECTION, SOLUTION INTRAVENOUS at 01:06

## 2023-01-01 RX ADMIN — PROPOFOL 50 MG: 10 INJECTION, EMULSION INTRAVENOUS at 11:06

## 2023-01-01 RX ADMIN — LOSARTAN POTASSIUM 50 MG: 25 TABLET, FILM COATED ORAL at 11:07

## 2023-01-01 RX ADMIN — POTASSIUM CHLORIDE 40 MEQ: 7.46 INJECTION, SOLUTION INTRAVENOUS at 06:12

## 2023-01-01 RX ADMIN — IPRATROPIUM BROMIDE AND ALBUTEROL SULFATE 3 ML: 2.5; .5 SOLUTION RESPIRATORY (INHALATION) at 08:06

## 2023-01-01 RX ADMIN — ENOXAPARIN SODIUM 80 MG: 80 INJECTION SUBCUTANEOUS at 11:07

## 2023-01-01 RX ADMIN — GABAPENTIN 300 MG: 300 CAPSULE ORAL at 11:07

## 2023-01-01 RX ADMIN — POTASSIUM CHLORIDE, DEXTROSE MONOHYDRATE AND SODIUM CHLORIDE: 150; 5; 450 INJECTION, SOLUTION INTRAVENOUS at 12:06

## 2023-02-16 PROBLEM — Z76.89 ESTABLISHING CARE WITH NEW DOCTOR, ENCOUNTER FOR: Status: ACTIVE | Noted: 2023-01-01

## 2023-02-16 PROBLEM — G62.9 NEUROPATHY: Status: ACTIVE | Noted: 2023-01-01

## 2023-02-16 PROBLEM — Z85.038 HISTORY OF COLON CANCER: Status: ACTIVE | Noted: 2023-01-01

## 2023-02-16 PROBLEM — I10 HYPERTENSION: Status: ACTIVE | Noted: 2023-01-01

## 2023-02-16 NOTE — PROGRESS NOTES
Subjective:       Patient ID: Supa Carmen is a 82 y.o. male.    Chief Complaint: Establish Care    Patient is a 82-year-old male here today to establish care.  Patient has past medical history of hypertension, neuropathy, and colon CA. no complaints at this time.  Review of Systems   Constitutional:  Negative for chills, fatigue and fever.   Musculoskeletal:  Positive for arthralgias.   All other systems reviewed and are negative.      Objective:      Physical Exam  Vitals reviewed.   Constitutional:       Appearance: Normal appearance.   HENT:      Head: Normocephalic.      Right Ear: Tympanic membrane normal.      Left Ear: Tympanic membrane normal.      Nose: Nose normal.      Mouth/Throat:      Mouth: Mucous membranes are moist.   Eyes:      Pupils: Pupils are equal, round, and reactive to light.   Cardiovascular:      Rate and Rhythm: Normal rate and regular rhythm.      Pulses: Normal pulses.      Heart sounds: Normal heart sounds.   Pulmonary:      Effort: Pulmonary effort is normal.      Breath sounds: Normal breath sounds.   Abdominal:      General: Abdomen is flat. There is no distension.      Palpations: Abdomen is soft. There is no mass.      Tenderness: There is no abdominal tenderness. There is no rebound.      Hernia: No hernia is present.   Musculoskeletal:         General: Normal range of motion.      Right hand: Tenderness present.      Left hand: Tenderness present.      Cervical back: Normal range of motion and neck supple.   Skin:     General: Skin is warm.      Capillary Refill: Capillary refill takes less than 2 seconds.   Neurological:      General: No focal deficit present.      Mental Status: He is alert.   Psychiatric:         Mood and Affect: Mood normal.         Behavior: Behavior normal.         Thought Content: Thought content normal.         Judgment: Judgment normal.       Assessment:       Problem List Items Addressed This Visit          Neuro    Neuropathy        Cardiac/Vascular    Hypertension       Oncology    History of colon cancer       Other    Establishing care with new doctor, encounter for - Primary    I spent a total of 30 minutes on the day of the visit.This includes face to face time and non-face to face time preparing to see the patient (eg, review of tests), obtaining and/or reviewing separately obtained history, documenting clinical information in the electronic or other health record, independently interpreting results and communicating results to the patient/family/caregiver, or care coordinator.       Plan:   Hypertension-low-sodium diet discussed with patient, blood pressure controlled on current medication regimen, continue.    Neuropathy-take gabapentin as previously prescribed, report any changes in level of pain.    Return to clinic in 1 month for wellness exam, labs to be completed prior to visit.

## 2023-02-16 NOTE — PROGRESS NOTES
Subjective:       Patient ID: Supa Carmen is a 82 y.o. male.    Chief Complaint: No chief complaint on file.    HPI  Review of Systems      Objective:      Physical Exam    Assessment:       Problem List Items Addressed This Visit    None  Visit Diagnoses       Establishing care with new doctor, encounter for    -  Primary    Hypertension, unspecified type                  Plan:   HTN  ***

## 2023-02-17 NOTE — TELEPHONE ENCOUNTER
----- Message from CELE Truong sent at 2/17/2023  9:22 AM CST -----  Can you cancel those orders, thanks  ----- Message -----  From: Sunitha Del Rio LPN  Sent: 2/17/2023   9:05 AM CST  To: CELE Truong    You saw this patient yesterday just letting you know. The lab work you ordered he is liking not going to do.  ----- Message -----  From: Natalia Rome  Sent: 2/17/2023   8:24 AM CST  To: Sunitha Del Rio LPN    Wife called stating that Mr. Cowart does not want any testing that Dr. Evans wants to order.  Please do not schedule them.

## 2023-05-12 NOTE — ED NOTES
Pt came in via ambulance. Pt states he was in the shower and slipped and fell in the shower and hit his head. Pt denies LOC. Pt has GCS of 15. Pt rates pain to head 2/10. Pt has a laceration to posterior aspect of scalp approximately 1 inch long. Pt denies needs at this time. Wound cleaned and prepped for MD

## 2023-05-12 NOTE — ED PROVIDER NOTES
Encounter Date: 5/12/2023       History     Chief Complaint   Patient presents with    Fall     Slipped and fell in shower this am. Laceration to back of head. Denies any LOC     This patient is a 82-year-old male who comes in with laceration to the back of the head after slipping and falling in the shower.  Patient states he did not have loss of consciousness.  There was a lot of bleeding.  No blood thinners.  Patient came in by ambulance    The history is provided by the patient.   Fall  The accident occurred just prior to arrival. The fall occurred while standing. He fell from a height of 1 to 2 ft. Impact surface: Hard bathtub. The volume of blood lost was moderate. The point of impact was the head.   Review of patient's allergies indicates:  No Known Allergies  Past Medical History:   Diagnosis Date    CAD (coronary artery disease)     Chemotherapy-induced neuropathy     Diverticulosis     DJD (degenerative joint disease)     Gout     History of colon cancer, stage III 05/20/2011    HLD (hyperlipidemia)     HTN (hypertension)     Personal history of colonic polyps 10/24/2017    s/p polypectomy - Dr Kenny Vargas     Past Surgical History:   Procedure Laterality Date    CARDIAC CATHETERIZATION  10/14/2009    Dr Tim Bryan    COLONOSCOPY W/ BIOPSIES AND POLYPECTOMY  05/30/2012    3mm polyp - Dr Kenny Vargas    COLONOSCOPY W/ BIOPSIES AND POLYPECTOMY  10/24/2017    2 polyps - Dr Kenny Vargas    COLONOSCOPY W/ BIOPSIES AND POLYPECTOMY  07/22/2014    2 polyps - Dr Kenny Vargas    HEMICOLECTOMY, RIGHT, LAPAROSCOPIC  05/20/2011    Dr Elijah Huntley    LUMBAR DISCECTOMY      MEDIPORT INSERTION, SINGLE  06/16/2011    Dr Elijah Huntley     Family History   Problem Relation Age of Onset    No Known Problems Mother     Other Father 42        intestinal rupture - Cause of death     Social History     Tobacco Use    Smoking status: Former     Types: Cigarettes    Smokeless tobacco: Never   Substance Use  Topics    Alcohol use: Yes    Drug use: Never     Review of Systems   Constitutional: Negative.    HENT: Negative.     Respiratory: Negative.     Cardiovascular: Negative.    Endocrine: Negative.    Musculoskeletal: Negative.    Skin: Negative.    Neurological:  Positive for light-headedness.   Psychiatric/Behavioral: Negative.     All other systems reviewed and are negative.    Physical Exam     Initial Vitals [05/12/23 0950]   BP Pulse Resp Temp SpO2   (!) 141/74 (!) 58 18 98.6 °F (37 °C) 96 %      MAP       --         Physical Exam    Nursing note and vitals reviewed.  Constitutional: He appears well-developed and well-nourished.   HENT:   Head: Normocephalic.       Patient has a 3 cm laceration to the back of the head that is oozing blood.   Eyes: Pupils are equal, round, and reactive to light.   Neck:   Normal range of motion.  Cardiovascular:  Normal rate and regular rhythm.           Pulmonary/Chest: Breath sounds normal.   Abdominal: Abdomen is soft. Bowel sounds are normal.   Musculoskeletal:         General: Normal range of motion.      Cervical back: Normal range of motion.     Neurological: He is alert and oriented to person, place, and time. He has normal strength. No cranial nerve deficit or sensory deficit.   Skin: Skin is warm and dry.   Psychiatric: He has a normal mood and affect.       ED Course   Lac Repair    Date/Time: 5/12/2023 11:45 AM  Performed by: Damaris Maxwell MD  Authorized by: Damaris Maxwell MD     Consent:     Consent obtained:  Verbal    Consent given by:  Patient    Risks, benefits, and alternatives were discussed: yes      Risks discussed:  Infection and pain  Universal protocol:     Procedure explained and questions answered to patient or proxy's satisfaction: yes      Patient identity confirmed:  Verbally with patient  Anesthesia:     Anesthesia method:  Local infiltration    Local anesthetic:  Lidocaine 2% w/o epi  Laceration details:     Location:  Scalp     Length (cm):  3    Depth (mm):  1.5  Pre-procedure details:     Preparation:  Patient was prepped and draped in usual sterile fashion  Treatment:     Area cleansed with:  Povidone-iodine    Amount of cleaning:  Standard    Irrigation solution:  Sterile saline  Skin repair:     Repair method:  Staples    Number of staples:  5  Approximation:     Approximation:  Close  Repair type:     Repair type:  Simple  Post-procedure details:     Dressing:  Antibiotic ointment    Procedure completion:  Tolerated well, no immediate complications  Labs Reviewed - No data to display       Imaging Results              CT Head Without Contrast (Final result)  Result time 05/12/23 11:30:34      Final result by Mana Del Rio MD (05/12/23 11:30:34)                   Impression:      1. No acute intracranial abnormality.  2. Chronic microvascular ischemic changes.      Electronically signed by: Mana Del Rio  Date:    05/12/2023  Time:    11:30               Narrative:    EXAMINATION:  CT HEAD WITHOUT CONTRAST    CLINICAL HISTORY:  Head trauma, minor (Age >= 65y);    TECHNIQUE:  Axial scans were obtained from skull base to the vertex.    Coronal and sagittal reconstructions obtained from the axial data.    Automatic exposure control was utilized to limit radiation dose.    Contrast: None    Radiation Dose:    Total DLP: 864 mGy*cm    COMPARISON:  None    FINDINGS:  There is no acute intracranial hemorrhage or edema. The gray-white matter differentiation is preserved.  Patchy hypodensities in the subcortical and periventricular white matter likely represent chronic microvascular ischemic changes    There is no mass effect or midline shift.  There is diffuse parenchymal volume loss.  The basal cisterns are patent. There is no abnormal extra-axial fluid collection.  Carotid artery calcifications are noted.    There is soft tissue swelling in the right posterior scalp.  The calvarium and skull base are intact.  There is mild  scattered paranasal sinus mucosal thickening.                                       Medications   LIDOcaine HCL 20 mg/ml (2%) injection 6 mL (6 mLs Infiltration Given 5/12/23 2108)     Medical Decision Making:   Initial Assessment:   This patient is a 82-year-old male who is brought to the ER by ambulance after having a slip and fall while taking a shower.  He hit back of his head against the bathtub and there is a 3 cm laceration there that is oozing blood.  Patient denies being on any blood thinners  Differential Diagnosis:   Last serration to the posterior part of the head, minor head trauma  Clinical Tests:   Radiological Study: Ordered and Reviewed  ED Management:  CT of the head was done on this patient and no acute abnormality was found.  Patient does have chronic microvascular ischemic changes.  Laceration repair was done on the back of the head with staple                        Clinical Impression:   Final diagnoses:  [S09.90XA] Injury of head, initial encounter (Primary)  [S01.01XA] Laceration of occipital scalp, initial encounter  [W19.XXXA] Fall, initial encounter        ED Disposition Condition    Discharge Stable          ED Prescriptions    None       Follow-up Information       Follow up With Specialties Details Why Contact Info    Randy Evans DO Family Medicine, Hospitalist Schedule an appointment as soon as possible for a visit in 1 week  [address] [facility] [address]  [phone]               Damaris Maxwell MD  05/12/23 8750

## 2023-05-19 NOTE — PROGRESS NOTES
Patient ID: [id number]     Chief Complaint: Suture / Staple Removal        HPI:     Supa Carmen is a 82 y.o. male here today for Suture / Staple Removal No other complaints today.         ----------------------------  CAD (coronary artery disease)  Chemotherapy-induced neuropathy  Diverticulosis  DJD (degenerative joint disease)  Gout  History of colon cancer, stage III  HLD (hyperlipidemia)  HTN (hypertension)  Personal history of colonic polyps      Comment:  s/p polypectomy - Dr Kenny Vargas     Past Surgical History:   Procedure Laterality Date    CARDIAC CATHETERIZATION  10/14/2009    Dr Tim Bryan    COLONOSCOPY W/ BIOPSIES AND POLYPECTOMY  05/30/2012    3mm polyp - Dr Kenny Vargas    COLONOSCOPY W/ BIOPSIES AND POLYPECTOMY  10/24/2017    2 polyps - Dr Kenny Vargas    COLONOSCOPY W/ BIOPSIES AND POLYPECTOMY  07/22/2014    2 polyps - Dr Kenny Vargas    HEMICOLECTOMY, RIGHT, LAPAROSCOPIC  05/20/2011    Dr Elijah Huntley    LUMBAR DISCECTOMY      MEDIPORT INSERTION, SINGLE  06/16/2011    Dr Elijah Huntley       Review of patient's allergies indicates:  No Known Allergies    Outpatient Medications Marked as Taking for the 5/19/23 encounter (Office Visit) with Randy Evans, DO   Medication Sig Dispense Refill    atenoloL (TENORMIN) 25 MG tablet TAKE 1 TABLET BY MOUTH EVERY DAY 90 tablet 0    gabapentin (NEURONTIN) 300 MG capsule Take 1 capsule (300 mg total) by mouth 3 (three) times daily. 90 capsule 2    losartan (COZAAR) 50 MG tablet TAKE 1 TABLET BY MOUTH EVERY DAY 90 tablet 3       Social History     Socioeconomic History    Marital status:    Tobacco Use    Smoking status: Former     Types: Cigarettes    Smokeless tobacco: Never   Substance and Sexual Activity    Alcohol use: Yes    Drug use: Never    Sexual activity: Not Currently     Social Determinants of Health     Financial Resource Strain: Low Risk     Difficulty of Paying Living Expenses: Not hard at all   Food  Insecurity: No Food Insecurity    Worried About Running Out of Food in the Last Year: Never true    Ran Out of Food in the Last Year: Never true   Transportation Needs: No Transportation Needs    Lack of Transportation (Medical): No    Lack of Transportation (Non-Medical): No   Physical Activity: Insufficiently Active    Days of Exercise per Week: 4 days    Minutes of Exercise per Session: 20 min   Stress: No Stress Concern Present    Feeling of Stress : Only a little   Social Connections: Moderately Integrated    Frequency of Communication with Friends and Family: Three times a week    Frequency of Social Gatherings with Friends and Family: Twice a week    Attends [employer] Services: More than 4 times per year    Active Member of Clubs or Organizations: Yes    Attends Club or Organization Meetings: 1 to 4 times per year    Marital Status:    Housing Stability: Low Risk     Unable to Pay for Housing in the Last Year: No    Number of Places Lived in the Last Year: 1    Unstable Housing in the Last Year: No        Family History   Problem Relation Age of Onset    No Known Problems Mother     Other Father 42        intestinal rupture - Cause of death        Patient Care Team:  Randy Evans DO as PCP - General (Family Medicine)  Tim Bryan MD as Consulting Physician (Cardiovascular Disease)  Kenny Vargas MD as Consulting Physician (Gastroenterology)  Elijah Huntley MD as Consulting Physician (Colon and Rectal Surgery)  Kristal Huston MD as Consulting Physician (Interventional Cardiology)     Subjective:     Review of Systems   Constitutional:  Negative for chills and fever.   Respiratory:  Negative for shortness of breath.    Cardiovascular:  Negative for chest pain.   Gastrointestinal:  Negative for constipation and diarrhea.   Neurological:  Negative for headaches.   Psychiatric/Behavioral:  The patient does not have insomnia.      See HPI for details  All Other ROS: Negative except  "as stated in HPI.       Objective:     /64   Pulse 60   Temp 97.3 °F (36.3 °C) (Tympanic)   Resp 16   Ht 5' 4.96" (1.65 m)   Wt 77.3 kg (170 lb 6.4 oz)   SpO2 98%   BMI 28.39 kg/m²     Physical Exam  Vitals reviewed.   Constitutional:       General: He is not in acute distress.     Appearance: Normal appearance. He is not ill-appearing.   Cardiovascular:      Rate and Rhythm: Normal rate and regular rhythm.      Pulses: Normal pulses.      Heart sounds: Normal heart sounds. No murmur heard.    No friction rub. No gallop.   Pulmonary:      Effort: No respiratory distress.      Breath sounds: No wheezing, rhonchi or rales.   Musculoskeletal:         General: No swelling.      Right lower leg: No edema.      Left lower leg: No edema.   Skin:     General: Skin is warm and dry.   Neurological:      General: No focal deficit present.      Mental Status: He is alert.   Psychiatric:         Mood and Affect: Mood normal.         Behavior: Behavior normal.       Assessment/Plan:     1. Laceration of skin of scalp, subsequent encounter    Other orders  -     mupirocin (BACTROBAN) 2 % ointment; Apply topically 2 (two) times daily.  Dispense: 30 g; Refill: 0      Removed staples from scalp. Antibiotic ointment for infection prophylaxis. 1 staple applied to region of scalp not closed, return in 1 week to re-assess.   Follow up:     No follow-ups on file. In addition to their scheduled follow up, the patient has also been instructed to follow up on as needed basis.         "

## 2023-06-15 PROBLEM — D72.829 LEUKOCYTOSIS: Status: ACTIVE | Noted: 2023-01-01

## 2023-06-15 PROBLEM — E87.20 LACTIC ACIDOSIS: Status: ACTIVE | Noted: 2023-01-01

## 2023-06-15 PROBLEM — E86.0 DEHYDRATION: Status: ACTIVE | Noted: 2023-01-01

## 2023-06-15 NOTE — ED NOTES
Pt brought into room 2 via wheelchair. Pt states he started having abdominal pain yesterday. Pt states his abdominal pain is 8/10. Upon palpation, pt states the pain is localized to his lower right abdominal region. Pt states he threw up once this morning. Pt denies needs at this time. Pt family at bedside.

## 2023-06-15 NOTE — ED PROVIDER NOTES
Encounter Date: 6/15/2023       History     Chief Complaint   Patient presents with    Abdominal Pain     Left lower abdominal pain since yesterday.       Left lower abdominal pain since yesterday.  Last BM was 3 days ago. No N/V. No fevers    The history is provided by the patient.   Abdominal Pain  The current episode started yesterday. The onset of the illness was gradual. The abdominal pain is located in the LLQ. The abdominal pain does not radiate. The severity of the abdominal pain is 5/10. The abdominal pain is relieved by nothing. The other symptoms of the illness do not include nausea, vomiting or diarrhea.   The patient states that she believes she is currently not pregnant. The patient has had a change in bowel habit. Risk factors for an acute abdominal problem include being elderly. Additional symptoms associated with the illness include constipation. Symptoms associated with the illness do not include chills.   Review of patient's allergies indicates:  No Known Allergies  Past Medical History:   Diagnosis Date    CAD (coronary artery disease)     Chemotherapy-induced neuropathy     Diverticulosis     DJD (degenerative joint disease)     Gout     History of colon cancer, stage III 05/20/2011    HLD (hyperlipidemia)     HTN (hypertension)     Personal history of colonic polyps 10/24/2017    s/p polypectomy - Dr Kenny Vargas     Past Surgical History:   Procedure Laterality Date    CARDIAC CATHETERIZATION  10/14/2009    Dr Tim Bryan    COLONOSCOPY N/A 6/27/2023    Procedure: COLONOSCOPY;  Surgeon: Kenny Varags MD;  Location: [facility];  Service: Gastroenterology;  Laterality: N/A;    COLONOSCOPY W/ BIOPSIES AND POLYPECTOMY  05/30/2012    3mm polyp - Dr Kenny Vargas    COLONOSCOPY W/ BIOPSIES AND POLYPECTOMY  10/24/2017    2 polyps - Dr Kenny Vargas    COLONOSCOPY W/ BIOPSIES AND POLYPECTOMY  07/22/2014    2 polyps - Dr Kenny Vargas    HEMICOLECTOMY, RIGHT, LAPAROSCOPIC  05/20/2011     Dr Elijah Huntley    LUMBAR DISCECTOMY      MEDIPORT INSERTION, SINGLE  06/16/2011    Dr Elijah Huntley     Family History   Problem Relation Age of Onset    No Known Problems Mother     Other Father 42        intestinal rupture - Cause of death     Social History     Tobacco Use    Smoking status: Former     Types: Cigarettes    Smokeless tobacco: Never   Substance Use Topics    Alcohol use: Yes    Drug use: Never     Review of Systems   Constitutional: Negative.  Negative for chills.   HENT: Negative.     Eyes: Negative.    Respiratory: Negative.     Cardiovascular: Negative.    Gastrointestinal:  Positive for abdominal pain and constipation. Negative for diarrhea, nausea and vomiting.   Endocrine: Negative.    Genitourinary: Negative.    Musculoskeletal: Negative.    Allergic/Immunologic: Negative.    Neurological: Negative.    Hematological: Negative.    Psychiatric/Behavioral: Negative.     All other systems reviewed and are negative.    Physical Exam     Initial Vitals [06/15/23 1043]   BP Pulse Resp Temp SpO2   (!) 171/85 72 20 97.5 °F (36.4 °C) 97 %      MAP       --         Physical Exam    Nursing note and vitals reviewed.  Constitutional: He appears well-developed and well-nourished.   HENT:   Head: Normocephalic and atraumatic.   Eyes: EOM are normal. Pupils are equal, round, and reactive to light.   Neck: Neck supple.   Normal range of motion.  Cardiovascular:  Normal rate, regular rhythm, normal heart sounds and intact distal pulses.           Pulmonary/Chest: Breath sounds normal.   Abdominal: Abdomen is soft and flat. Bowel sounds are normal. He exhibits no distension. There is generalized abdominal tenderness and tenderness in the left lower quadrant. There is no rebound and no guarding.   Musculoskeletal:         General: Normal range of motion.      Cervical back: Normal range of motion and neck supple.     Neurological: He is alert and oriented to person, place, and time. He has normal  strength. GCS score is 15. GCS eye subscore is 4. GCS verbal subscore is 5. GCS motor subscore is 6.   Skin: Skin is warm and dry.   Psychiatric: He has a normal mood and affect. His behavior is normal. Judgment and thought content normal.       ED Course   Critical Care    Date/Time: 7/14/2023 2:02 AM  Performed by: Antoni Yi MD  Authorized by: Randy Evans DO   Direct patient critical care time: 5 minutes  Additional history critical care time: 5 minutes  Ordering / reviewing critical care time: 5 minutes  Documentation critical care time: 10 minutes  Consulting other physicians critical care time: 5 minutes  Consult with family critical care time: 5 minutes  Total critical care time (exclusive of procedural time) : 35 minutes  Critical care was necessary to treat or prevent imminent or life-threatening deterioration of the following conditions: circulatory failure.  Critical care was time spent personally by me on the following activities: blood draw for specimens, interpretation of cardiac output measurements, evaluation of patient's response to treatment, examination of patient, obtaining history from patient or surrogate, ordering and performing treatments and interventions, ordering and review of laboratory studies, ordering and review of radiographic studies, re-evaluation of patient's condition, review of old charts and pulse oximetry.      Labs Reviewed   COMPREHENSIVE METABOLIC PANEL - Abnormal; Notable for the following components:       Result Value    Potassium Level 5.2 (*)     Glucose Level 179 (*)     Creatinine 1.76 (*)     Calcium Level Total 10.1 (*)     All other components within normal limits   CBC WITH DIFFERENTIAL - Abnormal; Notable for the following components:    WBC 21.84 (*)     All other components within normal limits   MANUAL DIFFERENTIAL - Abnormal; Notable for the following components:    Neutrophils % 86 (*)     Lymphs % 2 (*)     Neutrophils Abs Calc 19.2192 (*)      Lymphs Abs 0.4368 (*)     Eosinophils Abs 1.092 (*)     All other components within normal limits   LACTIC ACID, PLASMA - Abnormal; Notable for the following components:    Lactic Acid Level 6.0 (*)     All other components within normal limits   URINALYSIS, REFLEX TO URINE CULTURE - Abnormal; Notable for the following components:    Protein, UA Trace (*)     Blood, UA Trace (*)     All other components within normal limits   LACTIC ACID, PLASMA - Abnormal; Notable for the following components:    Lactic Acid Level 4.8 (*)     All other components within normal limits   CBC WITH DIFFERENTIAL - Abnormal; Notable for the following components:    WBC 22.77 (*)     Hgb 13.5 (*)     Hct 41.3 (*)     MCHC 32.7 (*)     All other components within normal limits   LACTIC ACID, PLASMA - Abnormal; Notable for the following components:    Lactic Acid Level 5.8 (*)     All other components within normal limits   MANUAL DIFFERENTIAL - Abnormal; Notable for the following components:    Neutrophils % 93 (*)     Neutrophils Abs Calc 21.1761 (*)     Monocytes Abs 1.5939 (*)     All other components within normal limits   AMYLASE - Normal   LIPASE - Normal   CK - Normal   URINALYSIS, MICROSCOPIC - Normal   CBC W/ AUTO DIFFERENTIAL    Narrative:     The following orders were created for panel order CBC auto differential.  Procedure                               Abnormality         Status                     ---------                               -----------         ------                     CBC with Differential[[id number]]        Abnormal            Final result               Manual Differential[[id number]]          Abnormal            Final result                 Please view results for these tests on the individual orders.   CBC W/ AUTO DIFFERENTIAL    Narrative:     The following orders were created for panel order CBC auto differential.  Procedure                               Abnormality         Status                      ---------                               -----------         ------                     CBC with Differential[[id number]]        Abnormal            Final result               Manual Differential[[id number]]          Abnormal            Final result                 Please view results for these tests on the individual orders.          Imaging Results              X-Ray Chest PA And Lateral (Final result)  Result time 06/15/23 12:52:49      Final result by Dawson Bolivar MD (06/15/23 12:52:49)                   Impression:      No acute findings.      Electronically signed by: Dawson Bolivar  Date:    06/15/2023  Time:    12:52               Narrative:    EXAMINATION:  XR CHEST PA AND LATERAL    CLINICAL HISTORY:  Elevated white blood cell count, unspecified    COMPARISON:  No priors    FINDINGS:  PA and lateral views of the chest were obtained. Heart is not significantly enlarged.  There is aortic atherosclerosis.  The lungs are clear.  No pneumothorax or significant pleural effusion.                                       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  No contrast was administered.  Automatic exposure control is utilized to reduce patient radiation exposure.    COMPARISON:  None    FINDINGS:  The lung bases are clear.    There is a small hiatal hernia.    The liver appears normal.   No obvious liver mass or lesion is seen.    Gallbladder appears normal.  No gallstones are seen.    The pancreas appears normal.  No inflammatory changes are seen in the pancreatic region.    The spleen appears normal and adrenal glands appear normal.  No adrenal nodule is seen.    The kidneys are atrophic.  There is some medullary calcifications seen in the kidneys bilaterally.  No hydronephrosis or hydroureter seen.  No ureterolithiasis is seen.  There are some cysts in the right kidney.    No colitis is seen.  No diverticulitis is seen.  No appendicitis is seen.    No free air seen.  No free fluid is seen.  The urinary bladder appears normal.    Bones show no acute abnormality.  There are degenerative changes seen throughout the lumbar spine.                                       Medications   ondansetron disintegrating tablet 4 mg (4 mg Oral Given 6/15/23 1100)   0.9%  NaCl infusion (0 mLs Intravenous Stopped 6/15/23 1333)   piperacillin-tazobactam (ZOSYN) 4.5 g in dextrose 5 % in water (D5W) 5 % 100 mL IVPB (MB+) (0 g Intravenous Stopped 6/15/23 1433)   morphine injection 2 mg (2 mg Intravenous Given 6/15/23 1343)   sodium chloride 0.9% bolus 500 mL 500 mL (0 mLs Intravenous Stopped 6/15/23 1503)   0.9%  NaCl infusion (0 mLs Intravenous Stopped 6/15/23 1700)   morphine injection 2 mg (2 mg Intravenous Given 6/15/23 1442)   sodium chloride 0.9% bolus 1,000 mL 1,000 mL ( Intravenous Stopped 6/16/23 1628)   Lactobacillus acidophilus capsule 1 capsule (1 capsule Oral Given 6/19/23 1715)                 ED Course as of 07/14/23 0203   u Dagoberto 15, 2023   1301 White count is elevated and lactic acid has come back as 6.  Clinically the patient does not look very ill.  I am not sure why we are getting these numbers.  We are going to repeat the CBC in the lactic acid now to make sure this is not a spurious result. [PG]   1516 Case discussed with the primary care physician.  This may actually be a case of dehydration  resulting in hemo concentration and elevated lactic acid levels.  This can be considered especially given the fact that the lactic acid has improved since giving IV fluids.  However given the persistently elevated white cell count, we will place the patient in observation gently hydrate and switch the patient to Rocephin pending the results of cultures.  I discussed this with the primary care physician who will be admitting the patient and he is in agreement with the plan. [PG]      ED Course User Index  [PG] Antoni Yi MD                 Clinical Impression:   Final diagnoses:  [D72.829] Leukocytosis  [E87.20] Lactic acidosis (Primary)  [E86.0] Dehydration        ED Disposition Condition    Observation Stable                Antoni Yi MD  06/22/23 0721       Antoni Yi MD  07/14/23 0200

## 2023-06-15 NOTE — PLAN OF CARE
Problem: Adult Inpatient Plan of Care  Goal: Plan of Care Review  Outcome: Ongoing, Progressing  Goal: Patient-Specific Goal (Individualized)  Outcome: Ongoing, Progressing  Goal: Absence of Hospital-Acquired Illness or Injury  Outcome: Ongoing, Progressing  Goal: Optimal Comfort and Wellbeing  Outcome: Ongoing, Progressing  Goal: Readiness for Transition of Care  Outcome: Ongoing, Progressing     Problem: Skin Injury Risk Increased  Goal: Skin Health and Integrity  Outcome: Ongoing, Progressing     Problem: Hypertension Comorbidity  Goal: Blood Pressure in Desired Range  Outcome: Ongoing, Progressing     Problem: Fall Injury Risk  Goal: Absence of Fall and Fall-Related Injury  Outcome: Ongoing, Progressing     Problem: Pain Acute  Goal: Acceptable Pain Control and Functional Ability  Outcome: Ongoing, Progressing     Problem: Fatigue  Goal: Improved Activity Tolerance  Outcome: Ongoing, Progressing     Problem: Fluid Volume Deficit  Goal: Fluid Balance  Outcome: Ongoing, Progressing     Problem: Infection  Goal: Absence of Infection Signs and Symptoms  Outcome: Ongoing, Progressing

## 2023-06-16 PROBLEM — N17.9 AKI (ACUTE KIDNEY INJURY): Status: ACTIVE | Noted: 2023-01-01

## 2023-06-16 PROBLEM — R10.31 RIGHT LOWER QUADRANT ABDOMINAL PAIN: Status: ACTIVE | Noted: 2023-01-01

## 2023-06-16 NOTE — PROGRESS NOTES
"Inpatient Nutrition Evaluation    Admit Date: 6/15/2023   Total duration of encounter: 1 day    Nutrition Recommendation/Prescription     When medically able, advance diet to Heart Healthy.     Nutrition Assessment     Chart Review    Reason Seen: continuous nutrition monitoring     Malnutrition Screening Tool Results   Have you recently lost weight without trying?: No  Have you been eating poorly because of a decreased appetite?: No   MST Score: 0     Diagnosis:  Lactic Acidosis    Relevant Medical History: HTN, Chemo-induced neuropathy, HLD, Hx colon cancer stage III, gout, colonic polyps, DJD, CAD, diverticulosis    Nutrition-Related Medications: Miralax, Rocephin    Nutrition-Related Labs:  (6.16.23) WBC 20,000H H/H 12.9L/39.2L Cl 109H Cr 1.31HH glu 120H  Ca+ 8.6L Alk Phos 28L Alb 2.9L Lactate 2.3H (decreasing)    Diet Order: Diet NPO  Oral Supplement Order: none  Appetite/Oral Intake: good/% of meals  Factors Affecting Nutritional Intake: none identified  Food/Christianity/Cultural Preferences: none reported  Food Allergies: none reported    Skin Integrity: intact  Wound(s):   (6.16.23) Skin intact.    Comments    (6.16.23) Met with pt. Pt NPO for HIDA scan later today. Pt stated his appetite and intake are good. Consumed % of meals PTA. Reported no issues chewing or swallowing. Able to feed self with tray set-up. Stated %. No recent wt changes.     Anthropometrics    Height: 5' 5" (165.1 cm) Height Method: Stated  Last Weight: 76.9 kg (169 lb 8.5 oz) (06/15/23 1649) Weight Method: Bed Scale  BMI (Calculated): 28.2  BMI Classification: overweight (BMI 25-29.9)        Ideal Body Weight (IBW), Male: 136 lb     % Ideal Body Weight, Male (lb): 124.66 %                          Usual Weight Provided By: patient    Wt Readings from Last 5 Encounters:   06/15/23 76.9 kg (169 lb 8.5 oz)   05/19/23 77.3 kg (170 lb 6.4 oz)   05/12/23 77.1 kg (170 lb)   02/16/23 78.5 kg (173 lb)     Weight Change(s) Since " Admission:  Admit Weight: 75.8 kg (167 lb) (06/15/23 1043)      Patient Education    Not applicable.    Monitoring & Evaluation     Dietitian will monitor food and beverage intake.  Nutrition Risk/Follow-Up: low (follow-up in 5-7 days)  Patients assigned 'low nutrition risk' status do not qualify for a full nutritional assessment but will be monitored and re-evaluated in a 5-7 day time period. Please consult if re-evaluation needed sooner.

## 2023-06-16 NOTE — H&P
[facility] - Medical Surgical Unit  [facility] Medicine  History & Physical    Patient Name: Supa Carmen  MRN: [id number]  Patient Class: IP- Inpatient  Admission Date: 6/15/2023  Attending Physician: Randy Evans DO  Primary Care Provider: Randy Evans DO         Patient information was obtained from ER records, patient, and family.     Subjective:     Principal Problem:Right lower quadrant abdominal pain    Chief Complaint:   Chief Complaint   Patient presents with    Abdominal Pain     Left lower abdominal pain since yesterday.          HPI: Patient is an 82 year old male who presented to the ED on 6/15/23 with acute RLQ abdominal pain for 2 days. Patient was found to have leukocytosis with a WBC count over 20 with a left shift as well as severe lactic acidosis with an initial lactate over 6.0. He was given Zosyn, pain medication, ceftriaxone, and IV fluids. Lactic acid did improve with fluids and pain was initially not controlled with morphine but did improve with Dilaudid. UA negative, Chest X-ray negative and CT did not demonstrate any significant abnormalities to suggest the etiology of the abnormal labs and RLQ pain.     6/16/23: Placed in observation overnight  and IV fluids continued. This morning patient is feeling a little better but still having significant pain. WBC improved to 20 from 22 overnight and Lactate improved to 2.3.       Past Medical History:   Diagnosis Date    CAD (coronary artery disease)     Chemotherapy-induced neuropathy     Diverticulosis     DJD (degenerative joint disease)     Gout     History of colon cancer, stage III 05/20/2011    HLD (hyperlipidemia)     HTN (hypertension)     Personal history of colonic polyps 10/24/2017    s/p polypectomy - Dr Kenny Vargas       Past Surgical History:   Procedure Laterality Date    CARDIAC CATHETERIZATION  10/14/2009    Dr Tim Bryan    COLONOSCOPY W/ BIOPSIES AND POLYPECTOMY  05/30/2012    3mm polyp -   Kenny Vargas    COLONOSCOPY W/ BIOPSIES AND POLYPECTOMY  10/24/2017    2 polyps - Dr Kenny Vargas    COLONOSCOPY W/ BIOPSIES AND POLYPECTOMY  07/22/2014    2 polyps - Dr Kenny Vargas    HEMICOLECTOMY, RIGHT, LAPAROSCOPIC  05/20/2011    Dr Elijah Huntley    LUMBAR DISCECTOMY      MEDIPORT INSERTION, SINGLE  06/16/2011    Dr Elijah Huntley       Review of patient's allergies indicates:  No Known Allergies    No current facility-administered medications on file prior to encounter.     Current Outpatient Medications on File Prior to Encounter   Medication Sig    atenoloL (TENORMIN) 25 MG tablet TAKE 1 TABLET BY MOUTH EVERY DAY    losartan (COZAAR) 50 MG tablet TAKE 1 TABLET BY MOUTH EVERY DAY    gabapentin (NEURONTIN) 300 MG capsule Take 1 capsule (300 mg total) by mouth 3 (three) times daily.     Family History       Problem Relation (Age of Onset)    No Known Problems Mother    Other Father (42)          Tobacco Use    Smoking status: Former     Types: Cigarettes    Smokeless tobacco: Never   Substance and Sexual Activity    Alcohol use: Yes    Drug use: Never    Sexual activity: Not Currently     Review of Systems   Constitutional:  Negative for fatigue and fever.   HENT:  Negative for congestion and sore throat.    Respiratory:  Negative for chest tightness and shortness of breath.    Cardiovascular:  Negative for chest pain and palpitations.   Gastrointestinal:  Positive for abdominal pain, constipation and vomiting. Negative for abdominal distention, diarrhea, nausea and rectal pain.   Genitourinary:  Negative for difficulty urinating, dysuria, frequency, scrotal swelling and testicular pain.   Musculoskeletal:  Negative for arthralgias and myalgias.   Skin:  Negative for rash and wound.   Neurological:  Negative for dizziness and headaches.   Psychiatric/Behavioral:  Negative for confusion and decreased concentration.    Objective:     Vital Signs (Most Recent):  Temp: 98.4 °F (36.9  °C) (06/16/23 0724)  Pulse: 68 (06/16/23 0724)  Resp: 18 (06/16/23 0724)  BP: 111/62 (06/16/23 0845)  SpO2: (!) 92 % (06/16/23 0724) Vital Signs (24h Range):  Temp:  [98.1 °F (36.7 °C)-99.1 °F (37.3 °C)] 98.4 °F (36.9 °C)  Pulse:  [67-75] 68  Resp:  [18-26] 18  SpO2:  [92 %-95 %] 92 %  BP: (100-168)/(57-89) 111/62     Weight: 76.9 kg (169 lb 8.5 oz)  Body mass index is 28.21 kg/m².     Physical Exam  Vitals reviewed.   Constitutional:       General: He is not in acute distress.     Appearance: Normal appearance. He is not ill-appearing.   Eyes:      Extraocular Movements: EOM normal.      Pupils: Pupils are equal, round, and reactive to light.   Cardiovascular:      Rate and Rhythm: Normal rate and regular rhythm.      Pulses: Normal pulses.      Heart sounds: Normal heart sounds. No murmur heard.    No friction rub. No gallop.   Pulmonary:      Effort: No respiratory distress.      Breath sounds: No wheezing, rhonchi or rales.   Abdominal:      General: Bowel sounds are normal. There is no distension.      Palpations: There is no mass.      Tenderness: There is abdominal tenderness. There is no rebound.      Hernia: No hernia is present.      Comments: Right upper quadrant and RLQ pain to light palpation. Bowel sounds present in all quadrants.    Genitourinary:     Testes: Normal.   Musculoskeletal:         General: No swelling.      Right lower leg: No edema.      Left lower leg: No edema.   Skin:     General: Skin is warm and dry.   Neurological:      General: No focal deficit present.      Mental Status: He is alert.   Psychiatric:         Mood and Affect: Mood normal.         Behavior: Behavior normal.            CRANIAL NERVES     CN I  cranial nerve I not tested    CN II   Visual acuity: normal    CN III, IV, VI   Pupils are equal, round, and reactive to light.  Extraocular motions are normal.     CN V   Facial sensation intact.     CN VII   Facial expression full, symmetric.     CN VIII   CN VIII normal.      CN IX, X   CN IX normal.   CN X normal.     CN XI   CN XI normal.     CN XII   CN XII normal.      Significant Labs: All pertinent labs within the past 24 hours have been reviewed.    Significant Imaging: I have reviewed all pertinent imaging results/findings within the past 24 hours.    Assessment/Plan:     * Right lower quadrant abdominal pain  Unknown Etiology. Will consult general surgery. HIDA scan ordered after discussion of case with Surgeon. If HIDA scan negative, will consider repeat CT with IV contrast but this puts patient at increased risk of further kidney injury.      YOGESH (acute kidney injury)  Likely secondary to dehydration. Improving with IV fluids.       Leukocytosis  Improved somewhat overnight. Will start patient on Flagyl and restart Zosyn.       Lactic acidosis  Unknown etiology. Responding to fluids.       Dehydration  Unknown etiology. Improved with IV fluids. Will continue IV fluids at 100ml/hr (0.9% sodium chloride). Will give intermittent boluses of 1L over 1 hour and monitor patient for signs of volume overload.       Hypertension  Will resume home medications. BP initially elevated upon presentation to ED because patient did not take his medications the day he went to ED.       VTE Risk Mitigation (From admission, onward)         Ordered     enoxaparin injection 30 mg  Daily         06/15/23 1603     IP VTE HIGH RISK PATIENT  Once         06/15/23 1603     Place sequential compression device  Until discontinued         06/15/23 1603                           Randy Evans DO  Department of Hospital Medicine  [facility] - Medical Surgical Unit

## 2023-06-16 NOTE — ASSESSMENT & PLAN NOTE
Unknown etiology. Improved with IV fluids. Will continue IV fluids at 100ml/hr (0.9% sodium chloride). Will give intermittent boluses of 1L over 1 hour and monitor patient for signs of volume overload.

## 2023-06-16 NOTE — ASSESSMENT & PLAN NOTE
Will resume home medications. BP initially elevated upon presentation to ED because patient did not take his medications the day he went to ED.

## 2023-06-16 NOTE — SUBJECTIVE & OBJECTIVE
Past Medical History:   Diagnosis Date    CAD (coronary artery disease)     Chemotherapy-induced neuropathy     Diverticulosis     DJD (degenerative joint disease)     Gout     History of colon cancer, stage III 05/20/2011    HLD (hyperlipidemia)     HTN (hypertension)     Personal history of colonic polyps 10/24/2017    s/p polypectomy - Dr Kenny Vargas       Past Surgical History:   Procedure Laterality Date    CARDIAC CATHETERIZATION  10/14/2009    Dr Tim Bryan    COLONOSCOPY W/ BIOPSIES AND POLYPECTOMY  05/30/2012    3mm polyp - Dr Kenny Vargas    COLONOSCOPY W/ BIOPSIES AND POLYPECTOMY  10/24/2017    2 polyps - Dr Kenny Vargas    COLONOSCOPY W/ BIOPSIES AND POLYPECTOMY  07/22/2014    2 polyps - Dr Kenny Vargas    HEMICOLECTOMY, RIGHT, LAPAROSCOPIC  05/20/2011    Dr Elijah Huntley    LUMBAR DISCECTOMY      MEDIPORT INSERTION, SINGLE  06/16/2011    Dr Elijah Huntley       Review of patient's allergies indicates:  No Known Allergies    No current facility-administered medications on file prior to encounter.     Current Outpatient Medications on File Prior to Encounter   Medication Sig    atenoloL (TENORMIN) 25 MG tablet TAKE 1 TABLET BY MOUTH EVERY DAY    losartan (COZAAR) 50 MG tablet TAKE 1 TABLET BY MOUTH EVERY DAY    gabapentin (NEURONTIN) 300 MG capsule Take 1 capsule (300 mg total) by mouth 3 (three) times daily.     Family History       Problem Relation (Age of Onset)    No Known Problems Mother    Other Father (42)          Tobacco Use    Smoking status: Former     Types: Cigarettes    Smokeless tobacco: Never   Substance and Sexual Activity    Alcohol use: Yes    Drug use: Never    Sexual activity: Not Currently     Review of Systems   Constitutional:  Negative for fatigue and fever.   HENT:  Negative for congestion and sore throat.    Respiratory:  Negative for chest tightness and shortness of breath.    Cardiovascular:  Negative for chest pain and palpitations.   Gastrointestinal:   Positive for abdominal pain, constipation and vomiting. Negative for abdominal distention, diarrhea, nausea and rectal pain.   Genitourinary:  Negative for difficulty urinating, dysuria, frequency, scrotal swelling and testicular pain.   Musculoskeletal:  Negative for arthralgias and myalgias.   Skin:  Negative for rash and wound.   Neurological:  Negative for dizziness and headaches.   Psychiatric/Behavioral:  Negative for confusion and decreased concentration.    Objective:     Vital Signs (Most Recent):  Temp: 98.4 °F (36.9 °C) (06/16/23 0724)  Pulse: 68 (06/16/23 0724)  Resp: 18 (06/16/23 0724)  BP: 111/62 (06/16/23 0845)  SpO2: (!) 92 % (06/16/23 0724) Vital Signs (24h Range):  Temp:  [98.1 °F (36.7 °C)-99.1 °F (37.3 °C)] 98.4 °F (36.9 °C)  Pulse:  [67-75] 68  Resp:  [18-26] 18  SpO2:  [92 %-95 %] 92 %  BP: (100-168)/(57-89) 111/62     Weight: 76.9 kg (169 lb 8.5 oz)  Body mass index is 28.21 kg/m².     Physical Exam  Vitals reviewed.   Constitutional:       General: He is not in acute distress.     Appearance: Normal appearance. He is not ill-appearing.   Eyes:      Extraocular Movements: EOM normal.      Pupils: Pupils are equal, round, and reactive to light.   Cardiovascular:      Rate and Rhythm: Normal rate and regular rhythm.      Pulses: Normal pulses.      Heart sounds: Normal heart sounds. No murmur heard.    No friction rub. No gallop.   Pulmonary:      Effort: No respiratory distress.      Breath sounds: No wheezing, rhonchi or rales.   Abdominal:      General: Bowel sounds are normal. There is no distension.      Palpations: There is no mass.      Tenderness: There is abdominal tenderness. There is no rebound.      Hernia: No hernia is present.      Comments: Right upper quadrant and RLQ pain to light palpation. Bowel sounds present in all quadrants.    Genitourinary:     Testes: Normal.   Musculoskeletal:         General: No swelling.      Right lower leg: No edema.      Left lower leg: No edema.    Skin:     General: Skin is warm and dry.   Neurological:      General: No focal deficit present.      Mental Status: He is alert.   Psychiatric:         Mood and Affect: Mood normal.         Behavior: Behavior normal.            CRANIAL NERVES     CN I  cranial nerve I not tested    CN II   Visual acuity: normal    CN III, IV, VI   Pupils are equal, round, and reactive to light.  Extraocular motions are normal.     CN V   Facial sensation intact.     CN VII   Facial expression full, symmetric.     CN VIII   CN VIII normal.     CN IX, X   CN IX normal.   CN X normal.     CN XI   CN XI normal.     CN XII   CN XII normal.      Significant Labs: All pertinent labs within the past 24 hours have been reviewed.    Significant Imaging: I have reviewed all pertinent imaging results/findings within the past 24 hours.

## 2023-06-16 NOTE — ASSESSMENT & PLAN NOTE
Unknown Etiology. Will consult general surgery. HIDA scan ordered after discussion of case with Surgeon. If HIDA scan negative, will consider repeat CT with IV contrast but this puts patient at increased risk of further kidney injury.

## 2023-06-16 NOTE — PLAN OF CARE
Problem: Adult Inpatient Plan of Care  Goal: Plan of Care Review  Outcome: Ongoing, Progressing  Goal: Patient-Specific Goal (Individualized)  Outcome: Ongoing, Progressing  Goal: Absence of Hospital-Acquired Illness or Injury  Outcome: Ongoing, Progressing  Goal: Optimal Comfort and Wellbeing  Outcome: Ongoing, Progressing  Goal: Readiness for Transition of Care  Outcome: Ongoing, Progressing     Problem: Skin Injury Risk Increased  Goal: Skin Health and Integrity  Outcome: Ongoing, Progressing     Problem: Hypertension Comorbidity  Goal: Blood Pressure in Desired Range  Outcome: Ongoing, Progressing     Problem: Fall Injury Risk  Goal: Absence of Fall and Fall-Related Injury  Outcome: Ongoing, Progressing     Problem: Pain Acute  Goal: Acceptable Pain Control and Functional Ability  Outcome: Ongoing, Progressing     Problem: Fatigue  Goal: Improved Activity Tolerance  Outcome: Ongoing, Progressing     Problem: Fluid Volume Deficit  Goal: Fluid Balance  Outcome: Ongoing, Progressing     Problem: Infection  Goal: Absence of Infection Signs and Symptoms  Outcome: Ongoing, Progressing     Problem: Fluid and Electrolyte Imbalance (Acute Kidney Injury/Impairment)  Goal: Fluid and Electrolyte Balance  Outcome: Ongoing, Progressing     Problem: Oral Intake Inadequate (Acute Kidney Injury/Impairment)  Goal: Optimal Nutrition Intake  Outcome: Ongoing, Progressing     Problem: Renal Function Impairment (Acute Kidney Injury/Impairment)  Goal: Effective Renal Function  Outcome: Ongoing, Progressing

## 2023-06-16 NOTE — HPI
Patient is an 82 year old male who presented to the ED on 6/15/23 with acute RLQ abdominal pain for 2 days. Patient was found to have leukocytosis with a WBC count over 20 with a left shift as well as severe lactic acidosis with an initial lactate over 6.0. He was given Zosyn, pain medication, ceftriaxone, and IV fluids. Lactic acid did improve with fluids and pain was initially not controlled with morphine but did improve with Dilaudid. UA negative, Chest X-ray negative and CT did not demonstrate any significant abnormalities to suggest the etiology of the abnormal labs and RLQ pain.     6/16/23: Placed in observation overnight  and IV fluids continued. This morning patient is feeling a little better but still having significant pain. WBC improved to 20 from 22 overnight and Lactate improved to 2.3.

## 2023-06-17 NOTE — CONSULTS
[facility]  General Surgery  Consult Note    Consults  Subjective:     Chief Complaint/Reason for Admission:  Abdominal discomfort    History of Present Illness:  82-year-old white male admitted by primary care physician for evaluation of abdominal discomfort with associated leukocytosis, dehydration, and acute kidney injury.  Patient stated the pain discomfort began on Wednesday at night and has been persistent since that time.  He denies associated fevers sweats or chills.  He denies changes in bowel function and frequency.  He does have a previous history of colonic malignancy requiring right hemicolectomy.  Patient is uncertain of the stage but stated he did take chemotherapy which indicates at least stage III.  He is uncertain if he has been exposed to any recent sick contacts or ingested any questionable undergo food.  States that his colonoscopy is however up-to-date.  Indicates that the pain he is experiencing has been located in the right lower quadrant as well as the right back and flank.  CT scan was performed of the abdomen which exhibited no significant intra-abdominal pathology to explain his discomfort at this time.  He has been treated appropriately with empiric IV antibiotic therapy and has had a decreasing leukocytosis.  Patient admits that he feels better now compared to when he arrived at the hospital after 48 hours of hydration.    No current facility-administered medications on file prior to encounter.     Current Outpatient Medications on File Prior to Encounter   Medication Sig    atenoloL (TENORMIN) 25 MG tablet TAKE 1 TABLET BY MOUTH EVERY DAY    losartan (COZAAR) 50 MG tablet TAKE 1 TABLET BY MOUTH EVERY DAY    gabapentin (NEURONTIN) 300 MG capsule Take 1 capsule (300 mg total) by mouth 3 (three) times daily.       Review of patient's allergies indicates:  No Known Allergies    Past Medical History:   Diagnosis Date    CAD (coronary artery disease)      Chemotherapy-induced neuropathy     Diverticulosis     DJD (degenerative joint disease)     Gout     History of colon cancer, stage III 05/20/2011    HLD (hyperlipidemia)     HTN (hypertension)     Personal history of colonic polyps 10/24/2017    s/p polypectomy - Dr Kenny Vargas     Past Surgical History:   Procedure Laterality Date    CARDIAC CATHETERIZATION  10/14/2009    Dr Tim Bryan    COLONOSCOPY W/ BIOPSIES AND POLYPECTOMY  05/30/2012    3mm polyp - Dr Kenny Vargas    COLONOSCOPY W/ BIOPSIES AND POLYPECTOMY  10/24/2017    2 polyps - Dr Kenny Vargas    COLONOSCOPY W/ BIOPSIES AND POLYPECTOMY  07/22/2014    2 polyps - Dr Kenny Vargas    HEMICOLECTOMY, RIGHT, LAPAROSCOPIC  05/20/2011    Dr Elijah Huntley    LUMBAR DISCECTOMY      MEDIPORT INSERTION, SINGLE  06/16/2011    Dr Elijah Huntley     Family History       Problem Relation (Age of Onset)    No Known Problems Mother    Other Father (42)          Tobacco Use    Smoking status: Former     Types: Cigarettes    Smokeless tobacco: Never   Substance and Sexual Activity    Alcohol use: Yes    Drug use: Never    Sexual activity: Not Currently     Review of Systems  Objective:     Vital Signs (Most Recent):  Temp: 97.9 °F (36.6 °C) (06/17/23 0745)  Pulse: 60 (06/17/23 0745)  Resp: 20 (06/17/23 0745)  BP: 112/70 (06/17/23 0745)  SpO2: 95 % (06/17/23 0745) Vital Signs (24h Range):  Temp:  [97.9 °F (36.6 °C)-98.8 °F (37.1 °C)] 97.9 °F (36.6 °C)  Pulse:  [58-70] 60  Resp:  [18-20] 20  SpO2:  [94 %-95 %] 95 %  BP: (100-132)/(52-70) 112/70     Weight: 76.9 kg (169 lb 8.5 oz)  Body mass index is 28.21 kg/m².      Intake/Output Summary (Last 24 hours) at 6/17/2023 0924  Last data filed at 6/17/2023 0706  Gross per 24 hour   Intake 3619.73 ml   Output --   Net 3619.73 ml       Physical Exam  Abdominal:      General: There is no distension.      Palpations: Abdomen is soft. There is no mass.      Tenderness: There is abdominal tenderness. There is no  right CVA tenderness, left CVA tenderness, guarding or rebound.      Hernia: No hernia is present.      Comments: Patient does show some isolated signs of peritoneal irritation located upon the lateral abdominal wall and right flank, no mass effect noted       Significant Labs:  CBC:   Recent Labs   Lab 06/17/23  0501   WBC 14.33*   RBC 4.06*   HGB 11.7*   HCT 36.0*      MCV 88.7   MCH 28.8   MCHC 32.5*     CMP:   Recent Labs   Lab 06/17/23  0501   CALCIUM 8.0*   ALBUMIN 2.4*      K 3.6   CO2 20*   BUN 24.0   CREATININE 1.43*   ALKPHOS 36*   ALT 6   AST 16   BILITOT 0.7     Lactic Acid: No results for input(s): LACTATE in the last 48 hours.    Significant Diagnostics:  CT: I have reviewed all pertinent results/findings within the past 24 hours.  Agree with CT scan findings    Assessment/Plan:     Active Diagnoses:    Diagnosis Date Noted POA    PRINCIPAL PROBLEM:  Right lower quadrant abdominal pain [R10.31] 06/16/2023 Yes    YOGESH (acute kidney injury) [N17.9] 06/16/2023 Yes    Dehydration [E86.0] 06/15/2023 Yes    Lactic acidosis [E87.20] 06/15/2023 Yes    Leukocytosis [D72.829] 06/15/2023 Yes    Hypertension [I10] 02/16/2023 Yes      Problems Resolved During this Admission:   Continue with empiric IV antibiotic therapy  Stat stool PCR testing for evaluation of underlying infectious etiology  Continue with IV hydration  Encourage ambulation as tolerated  We will continue to monitor with clinical abdominal exam    Overall patient appears to be responding appropriately to empiric treatment and medical management.  Infectious testing will be performed for underlying causes of leukocytosis with associated abdominal pain in no pathologic changes noted on CT scan.    Thank you for your consult. I will follow-up with patient. Please contact us if you have any additional questions.    Marybeth Mora MD  General Surgery  [facility]

## 2023-06-17 NOTE — NURSING
Contacted tele due to differences noted on patient's monitor. Confirmed the change began around 4pm today. Patient having multiple runs of what appears to be PVCs and PACs. Will continue to monitor and communicate with telemetry techs. No c/o from patient. He resting comfortably. IV fluids and zosyn still infusing.

## 2023-06-17 NOTE — PROGRESS NOTES
Hospital Medicine Progress Note        Chief Complaint: Inpatient Follow-up for     HPI: Patient is an 82 year old male who presented to the ED on 6/15/23 with acute RLQ abdominal pain for 2 days. Patient was found to have leukocytosis with a WBC count over 20 with a left shift as well as severe lactic acidosis with an initial lactate over 6.0. He was given Zosyn, pain medication, ceftriaxone, and IV fluids. Lactic acid did improve with fluids and pain was initially not controlled with morphine but did improve with Dilaudid. UA negative, Chest X-ray negative and CT did not demonstrate any significant abnormalities to suggest the etiology of the abnormal labs and RLQ pain.      6/16/23: Placed in observation overnight  and IV fluids continued. This morning patient is feeling a little better but still having significant pain. WBC improved to 20 from 22 overnight and Lactate improved to 2.3.        Interval Hx:     6/17- pt seen and examined this morning. Continues to have abd pain.but slightly better than yesterday  . Also reports traces of fresh blood in stool, noticed this morning, afebrile. Hida scan shows mild dyskinesia otherwise nl. Surgery is on board . vss    Objective/physical exam:  General: In no acute distress, afebrile  Chest: Clear to auscultation bilaterally  Heart: RRR, +S1, S2, no appreciable murmur  Abdomen: Soft,TTP diffusely, no guarding or rigidity  MSK: Warm, no lower extremity edema, no clubbing or cyanosis  Neurologic: Alert and oriented x4, Cranial nerve II-XII intact, Strength 5/5 in all 4 extremities    VITAL SIGNS: 24 HRS MIN & MAX LAST   Temp  Min: 97.9 °F (36.6 °C)  Max: 98.8 °F (37.1 °C) 97.9 °F (36.6 °C)   BP  Min: 100/52  Max: 132/69 112/70   Pulse  Min: 58  Max: 70  60   Resp  Min: 18  Max: 20 20   SpO2  Min: 94 %  Max: 95 % 95 %     I have reviewed the following labs:    Recent Labs   Lab 06/15/23  1312 06/16/23  0343 06/17/23  0501   WBC 22.77* 20.00* 14.33*   RBC 4.70 4.47* 4.06*    HGB 13.5* 12.9* 11.7*   HCT 41.3* 39.2* 36.0*   MCV 87.9 87.7 88.7   MCH 28.7 28.9 28.8   MCHC 32.7* 32.9* 32.5*   RDW 13.7 14.1 14.4    177 156   MPV 10.1 10.3 10.7*       Recent Labs   Lab 06/15/23  1913 06/16/23  0343 06/17/23  0501    141 141   K 4.8 4.3 3.6   CO2 24 23 20*   BUN 23.0 22.0 24.0   CREATININE 1.63* 1.31* 1.43*   CALCIUM 9.2 8.6* 8.0*   ALBUMIN 3.6 2.9* 2.4*   ALKPHOS 47 38* 36*   ALT 9 7 6   AST 23 21 16   BILITOT 0.8 0.8 0.7          Microbiology Results (last 7 days)       Procedure Component Value Units Date/Time    Blood Culture #1 **CANNOT BE ORDERED STAT** [[id number]]  (Normal) Collected: 06/15/23 1220    Order Status: Completed Specimen: Blood from Arm, Left Updated: 06/16/23 1900     CULTURE, BLOOD ([facility]) No Growth At 24 Hours    Blood Culture #2 **CANNOT BE ORDERED STAT** [[id number]]  (Normal) Collected: 06/15/23 1230    Order Status: Completed Specimen: Blood from Arm, Right Updated: 06/16/23 1900     CULTURE, BLOOD ([facility]) No Growth At 24 Hours             See below for Radiology    Scheduled Med:   atenoloL  25 mg Oral Daily    enoxparin  30 mg Subcutaneous Daily    gabapentin  300 mg Oral TID    losartan  50 mg Oral Daily    metronidazole  500 mg Intravenous Q8H    piperacillin-tazobactam (Zosyn) IV (PEDS and ADULTS) (extended infusion is not appropriate)  4.5 g Intravenous Q8H    polyethylene glycol  17 g Oral Daily    sucralfate  1 g Oral QID (AC & HS)        Continuous Infusions:   sodium chloride 0.9% 125 mL/hr at 06/17/23 0833        PRN Meds:  hydrALAZINE, HYDROmorphone, melatonin, ondansetron, sodium chloride 0.9%       Assessment/Plan:  Enteritis  Abd pain   Sepsis   Leukocytosis-improving  Glynn  Htn    Plan  HIDA scan - mild dyskinesia otherwise nl  Surgery is on board  Stool cx ordered   Ordered fobt  Cont zosyn and flagyl  Cont ivf  Lactic acid improved to nl  Wbc count improving  Cont remaining meds      VTE prophylaxis: lovenox         All diagnosis and  differential diagnosis have been reviewed; assessment and plan has been documented; I have personally reviewed the labs and test results that are presently available; I have reviewed the patients medication list; I have reviewed the consulting providers response and recommendations. I have reviewed or attempted to review medical records based upon their availability    All of the patient's questions have been  addressed and answered. Patient's is agreeable to the above stated plan. I will continue to monitor closely and make adjustments to medical management as needed.  _____________________________________________________________________    Nutrition Status:    Radiology:  I have personally reviewed the following imaging and agree with the radiologist.     NM Hepatobiliary Scan (HIDA)  EXAMINATION  [facility]  (HIDA)    CLINICAL HISTORY  Abdominal pain, acute, nonlocalized;    TECHNIQUE  Following the uneventful intravenous administration of 8 mCi non-HEU Tc-99m mebrofenin (Choletec), planar scintigraphic acquisition of the abdomen was obtained over the course 60 minutes, with static and dynamic images submitted for review.    A fatty meal (Ensure, 8oz) was then consumed to augment the study and evaluate ejection fraction over the course of approximately 30 minutes.    COMPARISON  None available at the time of initial interpretation.    FINDINGS  There is prompt homogeneous radiotracer uptake in the liver, with subsequent excretion into the intrahepatic and extrahepatic biliary system. Normal passage of radiotracer into the duodenum is also demonstrated.    Activity within the gallbladder is readily identified.    Normal onset of gallbladder contraction is noted following above-referenced fatty meal ingestion.  The calculated gallbladder ejection fraction is markedly low, 13% over the span of 20 minutes.    IMPRESSION  1. Unremarkable scintigraphic evaluation of the hepatobiliary uptake, and  cystic and common duct patency.  2. Delayed onset and minimal gallbladder contractility could reflect changes of biliary dyskinesia versus chronic cholecystitis.  3. Gallbladder ejection fraction 13%.  ==========    This report was flagged in Epic as abnormal.    Electronically signed by: Ian Champion  Date:    06/16/2023  Time:    15:49  X-Ray Abdomen AP 1 View  Narrative: EXAMINATION:  XR ABDOMEN AP 1 VIEW    CLINICAL HISTORY:  Abdominal pain, distention and hypoactive bowel sounds;    TECHNIQUE:  AP view of the abdomen.    COMPARISON:  None.    FINDINGS:  The bowel gas pattern is nonobstructive.  The lung bases are clear.  Impression: Nonobstructive bowel gas pattern.    Electronically signed by: Mana Del Rio  Date:    06/16/2023  Time:    07:51      Bethany Campbell MD   06/17/2023

## 2023-06-17 NOTE — NURSING
Assisted to restroom. Feces noted on bedding and patient. Linens changed and patient cleaned with wipes and given clean gown and a disposal brief. Patient was grateful. Feces appeared to have blood. Insert added to toilet to collect stool in case MD orders stool sample. Tele is still showing multiple abnormal runs. Printout obtained for MD. Charge nurse made aware of all changes.

## 2023-06-18 NOTE — PROGRESS NOTES
[facility]  General Surgery  Progress Note    Subjective:     Interval History:  No acute events reported by nursing staff the patient overnight.  Patient has had multiple bowel movements since yesterday without noticing gross blood.  Stool was tested for occult blood was positive x3.  Patient admits that the pain discomfort has essentially resolved.  He has minimal to no tenderness at this time.  Currently denying nausea.  Admits that his appetite has returned.  Has been treated empirically for infection and stool studies are pending for PCR evaluation.    Post-Op Info:  * No surgery found *          Medications:  Continuous Infusions:   sodium chloride 0.9% 125 mL/hr at 06/18/23 1102     Scheduled Meds:   atenoloL  25 mg Oral Daily    enoxparin  30 mg Subcutaneous Daily    gabapentin  300 mg Oral TID    losartan  50 mg Oral Daily    metronidazole  500 mg Intravenous Q8H    piperacillin-tazobactam (Zosyn) IV (PEDS and ADULTS) (extended infusion is not appropriate)  4.5 g Intravenous Q8H    sucralfate  1 g Oral QID (AC & HS)     PRN Meds:hydrALAZINE, HYDROmorphone, melatonin, ondansetron, polyethylene glycol, sodium chloride 0.9%     Objective:     Vital Signs (Most Recent):  Temp: 97.9 °F (36.6 °C) (06/18/23 1141)  Pulse: (!) 54 (06/18/23 1141)  Resp: (!) 21 (06/18/23 1141)  BP: 132/71 (06/18/23 1141)  SpO2: 97 % (06/18/23 1141) Vital Signs (24h Range):  Temp:  [97.9 °F (36.6 °C)-98.9 °F (37.2 °C)] 97.9 °F (36.6 °C)  Pulse:  [54-66] 54  Resp:  [18-21] 21  SpO2:  [96 %-98 %] 97 %  BP: (111-141)/(62-71) 132/71       Intake/Output Summary (Last 24 hours) at 6/18/2023 1331  Last data filed at 6/18/2023 1037  Gross per 24 hour   Intake 2971.68 ml   Output --   Net 2971.68 ml       Physical Exam    Significant Labs:  CBC:   Recent Labs   Lab 06/18/23  0753   WBC 9.90   RBC 4.00*   HGB 11.6*   HCT 36.0*      MCV 90.0   MCH 29.0   MCHC 32.2*     CMP:   Recent Labs   Lab  06/18/23  0750   CALCIUM 8.4*   ALBUMIN 2.7*      K 3.6   CO2 18*   BUN 19.0   CREATININE 1.31*   ALKPHOS 36*   ALT 5   AST 14   BILITOT 0.6       Significant Diagnostics:  None    Assessment/Plan:     Active Diagnoses:    Diagnosis Date Noted POA    PRINCIPAL PROBLEM:  Right lower quadrant abdominal pain [R10.31] 06/16/2023 Yes    YOGESH (acute kidney injury) [N17.9] 06/16/2023 Yes    Dehydration [E86.0] 06/15/2023 Yes    Lactic acidosis [E87.20] 06/15/2023 Yes    Leukocytosis [D72.829] 06/15/2023 Yes    Hypertension [I10] 02/16/2023 Yes      Problems Resolved During this Admission:   Will advance to a clear liquid diet with monitoring for persistent symptoms or recurrent symptoms  Awaiting stool PCR for further recommendations however at this time patient appears to be recovering appropriately with empiric IV antibiotic therapy and having a normalization of his leukocytosis  Consideration can be made for diagnostic colonoscopy.  Patient has gastroenterologist had repeated colonoscopy within the last 5 years with overall normal findings of the time.  Patient does have a previous history of colon cancer.      Marybeth Mora MD  General Surgery  [facility]

## 2023-06-18 NOTE — PROGRESS NOTES
Hospital Medicine Progress Note        Chief Complaint: Inpatient Follow-up for     HPI: Patient is an 82 year old male who presented to the ED on 6/15/23 with acute RLQ abdominal pain for 2 days. Patient was found to have leukocytosis with a WBC count over 20 with a left shift as well as severe lactic acidosis with an initial lactate over 6.0. He was given Zosyn, pain medication, ceftriaxone, and IV fluids. Lactic acid did improve with fluids and pain was initially not controlled with morphine but did improve with Dilaudid. UA negative, Chest X-ray negative and CT did not demonstrate any significant abnormalities to suggest the etiology of the abnormal labs and RLQ pain.      6/16/23: Placed in observation overnight  and IV fluids continued. This morning patient is feeling a little better but still having significant pain. WBC improved to 20 from 22 overnight and Lactate improved to 2.3.      6/17- pt seen and examined this morning. Continues to have abd pain.but slightly better than yesterday  . Also reports traces of fresh blood in stool, noticed this morning, afebrile. Hida scan shows mild dyskinesia otherwise nl. Surgery is on board . vss    Interval Hx:     6/18/2023- seen and examined this morning. No acute events overnight. Vss. Abd has almost resolved  Had BM   Fobt x 3 pos   Stool pcr pending  Afebrile   Reports h is hungry    Objective/physical exam:  General: In no acute distress, afebrile  Chest: Clear to auscultation bilaterally  Heart: RRR, +S1, S2, no appreciable murmur  Abdomen: Soft,TTP diffusely, no guarding or rigidity  MSK: Warm, no lower extremity edema, no clubbing or cyanosis  Neurologic: Alert and oriented x4, Cranial nerve II-XII intact, Strength 5/5 in all 4 extremities    VITAL SIGNS: 24 HRS MIN & MAX LAST   Temp  Min: 97.9 °F (36.6 °C)  Max: 98.9 °F (37.2 °C) 97.9 °F (36.6 °C)   BP  Min: 111/66  Max: 141/66 (!) 141/66   Pulse  Min: 57  Max: 74  (!) 58   Resp  Min: 18  Max: 20 20   SpO2   Min: 93 %  Max: 98 % 98 %     I have reviewed the following labs:    Recent Labs   Lab 06/16/23 0343 06/17/23  0501 06/18/23  0753   WBC 20.00* 14.33* 9.90   RBC 4.47* 4.06* 4.00*   HGB 12.9* 11.7* 11.6*   HCT 39.2* 36.0* 36.0*   MCV 87.7 88.7 90.0   MCH 28.9 28.8 29.0   MCHC 32.9* 32.5* 32.2*   RDW 14.1 14.4 14.4    156 163   MPV 10.3 10.7* 10.7*         Recent Labs   Lab 06/16/23 0343 06/17/23  0501 06/18/23  0750    141 143   K 4.3 3.6 3.6   CO2 23 20* 18*   BUN 22.0 24.0 19.0   CREATININE 1.31* 1.43* 1.31*   CALCIUM 8.6* 8.0* 8.4*   ALBUMIN 2.9* 2.4* 2.7*   ALKPHOS 38* 36* 36*   ALT 7 6 5   AST 21 16 14   BILITOT 0.8 0.7 0.6            Microbiology Results (last 7 days)       Procedure Component Value Units Date/Time    Blood Culture #1 **CANNOT BE ORDERED STAT** [[id number]]  (Normal) Collected: 06/15/23 1220    Order Status: Completed Specimen: Blood from Arm, Left Updated: 06/17/23 1900     CULTURE, BLOOD ([facility]) No Growth At 48 Hours    Blood Culture #2 **CANNOT BE ORDERED STAT** [[id number]]  (Normal) Collected: 06/15/23 1230    Order Status: Completed Specimen: Blood from Arm, Right Updated: 06/17/23 1900     CULTURE, BLOOD ([facility]) No Growth At 48 Hours             See below for Radiology    Scheduled Med:   atenoloL  25 mg Oral Daily    enoxparin  30 mg Subcutaneous Daily    gabapentin  300 mg Oral TID    losartan  50 mg Oral Daily    metronidazole  500 mg Intravenous Q8H    piperacillin-tazobactam (Zosyn) IV (PEDS and ADULTS) (extended infusion is not appropriate)  4.5 g Intravenous Q8H    sucralfate  1 g Oral QID (AC & HS)        Continuous Infusions:   sodium chloride 0.9% 125 mL/hr at 06/18/23 0030        PRN Meds:  hydrALAZINE, HYDROmorphone, melatonin, ondansetron, polyethylene glycol, sodium chloride 0.9%       Assessment/Plan:  Enteritis  Abd pain   Sepsis   Leukocytosis-improving  Glynn  Htn  Hx of colon ca s/p resection and chemo in 2011 and has been in remission  Occult stool positive      Plan  HIDA scan - mild dyskinesia otherwise nl  Surgery is on board  Stool cx ordered   Ordered fobt x 3 pos likely 2/2 infection but due to his prev hx of colon ca he will need close follow up w GI and repeat colonoscopy once infection resolves outpt   Last colonosopy 5 years ago   Cont zosyn and flagyl  Cont ivf  Lactic acid improved to nl  Wbc count improving  Cont remaining meds      VTE prophylaxis: lovenox held due to blood in stool . Cont scds         All diagnosis and differential diagnosis have been reviewed; assessment and plan has been documented; I have personally reviewed the labs and test results that are presently available; I have reviewed the patients medication list; I have reviewed the consulting providers response and recommendations. I have reviewed or attempted to review medical records based upon their availability    All of the patient's questions have been  addressed and answered. Patient's is agreeable to the above stated plan. I will continue to monitor closely and make adjustments to medical management as needed.  _____________________________________________________________________    Nutrition Status:    Radiology:  I have personally reviewed the following imaging and agree with the radiologist.     NM Hepatobiliary Scan (HIDA)  EXAMINATION  NM HEPATOBILIARY IMAGING INC GB (HIDA)    CLINICAL HISTORY  Abdominal pain, acute, nonlocalized;    TECHNIQUE  Following the uneventful intravenous administration of 8 mCi non-HEU Tc-99m mebrofenin (Choletec), planar scintigraphic acquisition of the abdomen was obtained over the course 60 minutes, with static and dynamic images submitted for review.    A fatty meal (Ensure, 8oz) was then consumed to augment the study and evaluate ejection fraction over the course of approximately 30 minutes.    COMPARISON  None available at the time of initial interpretation.    FINDINGS  There is prompt homogeneous radiotracer uptake in the liver, with  subsequent excretion into the intrahepatic and extrahepatic biliary system. Normal passage of radiotracer into the duodenum is also demonstrated.    Activity within the gallbladder is readily identified.    Normal onset of gallbladder contraction is noted following above-referenced fatty meal ingestion.  The calculated gallbladder ejection fraction is markedly low, 13% over the span of 20 minutes.    IMPRESSION  1. Unremarkable scintigraphic evaluation of the hepatobiliary uptake, and cystic and common duct patency.  2. Delayed onset and minimal gallbladder contractility could reflect changes of biliary dyskinesia versus chronic cholecystitis.  3. Gallbladder ejection fraction 13%.  ==========    This report was flagged in Epic as abnormal.    Electronically signed by: Ian Champion  Date:    06/16/2023  Time:    15:49  X-Ray Abdomen AP 1 View  Narrative: EXAMINATION:  XR ABDOMEN AP 1 VIEW    CLINICAL HISTORY:  Abdominal pain, distention and hypoactive bowel sounds;    TECHNIQUE:  AP view of the abdomen.    COMPARISON:  None.    FINDINGS:  The bowel gas pattern is nonobstructive.  The lung bases are clear.  Impression: Nonobstructive bowel gas pattern.    Electronically signed by: Mana Del Rio  Date:    06/16/2023  Time:    07:51      Bethany Campbell MD   06/18/2023

## 2023-06-18 NOTE — PLAN OF CARE
Problem: Adult Inpatient Plan of Care  Goal: Plan of Care Review  6/18/2023 0736 by Barbara Del Rio RN  Outcome: Ongoing, Progressing  6/18/2023 0657 by Barbara Del Rio RN  Outcome: Ongoing, Progressing  Goal: Patient-Specific Goal (Individualized)  6/18/2023 0736 by Barbara Del Rio RN  Outcome: Ongoing, Progressing  6/18/2023 0657 by Barbara Del Rio RN  Outcome: Ongoing, Progressing  Goal: Absence of Hospital-Acquired Illness or Injury  6/18/2023 0736 by Barbara Del Rio RN  Outcome: Ongoing, Progressing  6/18/2023 0657 by Barbara Del Rio RN  Outcome: Ongoing, Progressing  Goal: Optimal Comfort and Wellbeing  6/18/2023 0736 by Barbara Del Rio RN  Outcome: Ongoing, Progressing  6/18/2023 0657 by Barbara Del Rio RN  Outcome: Ongoing, Progressing  Goal: Readiness for Transition of Care  6/18/2023 0736 by Barbara Del Rio RN  Outcome: Ongoing, Progressing  6/18/2023 0657 by Barbara Del Rio RN  Outcome: Ongoing, Progressing     Problem: Skin Injury Risk Increased  Goal: Skin Health and Integrity  6/18/2023 0736 by Barbara Del Rio RN  Outcome: Ongoing, Progressing  6/18/2023 0657 by Barbara Del Rio RN  Outcome: Ongoing, Progressing     Problem: Hypertension Comorbidity  Goal: Blood Pressure in Desired Range  6/18/2023 0736 by Barbara Del Rio RN  Outcome: Ongoing, Progressing  6/18/2023 0657 by Barbara Del Rio RN  Outcome: Ongoing, Progressing     Problem: Fall Injury Risk  Goal: Absence of Fall and Fall-Related Injury  6/18/2023 0736 by Barbara Del Rio RN  Outcome: Ongoing, Progressing  6/18/2023 0657 by Barbara Del Rio RN  Outcome: Ongoing, Progressing     Problem: Pain Acute  Goal: Acceptable Pain Control and Functional Ability  6/18/2023 0736 by Barbara Del Rio RN  Outcome: Ongoing, Progressing  6/18/2023 0657 by Barbara Del Rio RN  Outcome: Ongoing, Progressing     Problem: Fatigue  Goal: Improved Activity Tolerance  6/18/2023 0736 by Barbara Del Rio RN  Outcome: Ongoing,  Progressing  6/18/2023 0657 by Barbara Del Rio RN  Outcome: Ongoing, Progressing     Problem: Fluid Volume Deficit  Goal: Fluid Balance  6/18/2023 0736 by Barbara Del Rio RN  Outcome: Ongoing, Progressing  6/18/2023 0657 by Barbara Del Rio RN  Outcome: Ongoing, Progressing     Problem: Infection  Goal: Absence of Infection Signs and Symptoms  6/18/2023 0736 by Barbara Del Rio RN  Outcome: Ongoing, Progressing  6/18/2023 0657 by Barbara Del Rio RN  Outcome: Ongoing, Progressing     Problem: Fluid and Electrolyte Imbalance (Acute Kidney Injury/Impairment)  Goal: Fluid and Electrolyte Balance  6/18/2023 0736 by Barbara Del Rio RN  Outcome: Ongoing, Progressing  6/18/2023 0657 by Barbara Del Rio RN  Outcome: Ongoing, Progressing     Problem: Oral Intake Inadequate (Acute Kidney Injury/Impairment)  Goal: Optimal Nutrition Intake  6/18/2023 0736 by Barbara Del Rio RN  Outcome: Ongoing, Progressing  6/18/2023 0657 by Barbara Del Rio RN  Outcome: Ongoing, Progressing     Problem: Renal Function Impairment (Acute Kidney Injury/Impairment)  Goal: Effective Renal Function  6/18/2023 0736 by Barbara Del Rio RN  Outcome: Ongoing, Progressing  6/18/2023 0657 by Barbara Del Rio RN  Outcome: Ongoing, Progressing

## 2023-06-19 NOTE — PROGRESS NOTES
[facility] Medical Surgical Unit  General Surgery  Progress Note    Subjective:     Interval History:  No acute events reported by nursing staff the patient overnight.  Patient did have 2 bowel movements without complaint of nausea but did admit that he felt somewhat bloated after beginning low residue diet.  Diet was advanced this morning.  Currently denying abdominal pain discomfort.  GI PCR study normal at this time.  CT scan was performed last night with oral contrast which did exhibit a small amount of transudative fluid without changes concerning for bowel perforation.  Small area of questionable ileus versus small-bowel however patient appears to be asymptomatic at this time.    Post-Op Info:  * No surgery found *          Medications:  Continuous Infusions:  Scheduled Meds:   atenoloL  25 mg Oral Daily    enoxparin  30 mg Subcutaneous Daily    gabapentin  300 mg Oral TID    losartan  50 mg Oral Daily    sucralfate  1 g Oral QID (AC & HS)     PRN Meds:hydrALAZINE, HYDROmorphone, melatonin, ondansetron, polyethylene glycol, sodium chloride 0.9%     Objective:     Vital Signs (Most Recent):  Temp: 97.7 °F (36.5 °C) (06/19/23 1215)  Pulse: 60 (06/19/23 1215)  Resp: 20 (06/19/23 0752)  BP: (!) 162/80 (06/19/23 1215)  SpO2: 98 % (06/19/23 1215) Vital Signs (24h Range):  Temp:  [97.5 °F (36.4 °C)-98 °F (36.7 °C)] 97.7 °F (36.5 °C)  Pulse:  [57-64] 60  Resp:  [18-22] 20  SpO2:  [95 %-99 %] 98 %  BP: (123-162)/(68-87) 162/80       Intake/Output Summary (Last 24 hours) at 6/19/2023 1527  Last data filed at 6/19/2023 1215  Gross per 24 hour   Intake 5195.59 ml   Output --   Net 5195.59 ml       Physical Exam  Abdominal:      General: There is no distension.      Palpations: Abdomen is soft. There is no mass.      Tenderness: There is no abdominal tenderness. There is no right CVA tenderness, left CVA tenderness, guarding or rebound.      Hernia: No hernia is present.      Comments: Significantly improved  clinical examination, no abdominal tenderness noted on deep or rebound examination at this time, mildly protuberant abdomen compared to admission       Significant Labs:  CBC:   Recent Labs   Lab 06/19/23  0445   WBC 7.85   RBC 3.94*   HGB 11.3*   HCT 35.7*      MCV 90.6   MCH 28.7   MCHC 31.7*     CMP:   Recent Labs   Lab 06/18/23  0750 06/19/23  0445   CALCIUM 8.4* 7.7*   ALBUMIN 2.7*  --     140   K 3.6 3.8   CO2 18* 13*   BUN 19.0 14.0   CREATININE 1.31* 1.20*   ALKPHOS 36*  --    ALT 5  --    AST 14  --    BILITOT 0.6  --        Significant Diagnostics:  CT: I have reviewed all pertinent results/findings within the past 24 hours.  Small amount of free fluid noted without concerning changes on bowel evaluation.  Patient is clinically exhibiting normal signs of bowel transit with passage of flatus and stool.    Assessment/Plan:     Active Diagnoses:    Diagnosis Date Noted POA    PRINCIPAL PROBLEM:  Right lower quadrant abdominal pain [R10.31] 06/16/2023 Yes    YOGESH (acute kidney injury) [N17.9] 06/16/2023 Yes    Dehydration [E86.0] 06/15/2023 Yes    Lactic acidosis [E87.20] 06/15/2023 Yes    Leukocytosis [D72.829] 06/15/2023 Yes    Hypertension [I10] 02/16/2023 Yes      Problems Resolved During this Admission:     Overall patient has shown significant clinical improvement after empiric IV antibiotic therapy and bowel rest.  Still uncertain at this time as to the exact etiology of the current condition.  At the moment the patient had stool studies performed he had already been on empiric IV antibiotic therapy for multiple days therefore infectious etiology could be false negative at this time.  Regardless patient has shown significant improvement and has been able to tolerate liquid diet for over 24 hours with slow advancement today.  I believe that if patient remains asymptomatic overnight with continuous bowel function following advancement of his diet he would be stable for discharge home tomorrow  morning.  CT scan performed exhibiting no changes concerning for bowel perforation or significant inflammation however CT scan could not be performed with IV contrast due to patient's renal atrophy and poor renal function.    Marybeth Mora MD  General Surgery  [facility]

## 2023-06-19 NOTE — ASSESSMENT & PLAN NOTE
Improved somewhat overnight. Will start patient on Flagyl and restart Zosyn.     6/19/23: Resolved. Will D/C ABXs.

## 2023-06-19 NOTE — SUBJECTIVE & OBJECTIVE
Review of Systems   Constitutional:  Negative for fatigue and fever.   Respiratory:  Negative for shortness of breath.    Cardiovascular:  Negative for chest pain.   Gastrointestinal:  Negative for abdominal pain, constipation, diarrhea and nausea.   Neurological:  Negative for dizziness and headaches.   Objective:     Vital Signs (Most Recent):  Temp: 97.7 °F (36.5 °C) (06/19/23 1215)  Pulse: 60 (06/19/23 1215)  Resp: 20 (06/19/23 0752)  BP: (!) 162/80 (06/19/23 1215)  SpO2: 98 % (06/19/23 1215) Vital Signs (24h Range):  Temp:  [97.5 °F (36.4 °C)-98 °F (36.7 °C)] 97.7 °F (36.5 °C)  Pulse:  [57-64] 60  Resp:  [18-22] 20  SpO2:  [95 %-99 %] 98 %  BP: (123-162)/(68-87) 162/80     Weight:  (NPO)  Body mass index is 28.21 kg/m².    Intake/Output Summary (Last 24 hours) at 6/19/2023 1536  Last data filed at 6/19/2023 1215  Gross per 24 hour   Intake 5295.89 ml   Output --   Net 5295.89 ml         Physical Exam  Vitals reviewed.   Constitutional:       General: He is not in acute distress.     Appearance: Normal appearance. He is not ill-appearing.   Cardiovascular:      Rate and Rhythm: Normal rate and regular rhythm.      Pulses: Normal pulses.      Heart sounds: Normal heart sounds. No murmur heard.    No friction rub. No gallop.   Pulmonary:      Effort: No respiratory distress.      Breath sounds: No wheezing, rhonchi or rales.   Abdominal:      General: Bowel sounds are decreased. There is distension.      Tenderness: There is no abdominal tenderness.      Comments: Hypoactive bowel sounds in all quadrants.    Musculoskeletal:         General: No swelling.      Right lower leg: No edema.      Left lower leg: No edema.   Skin:     General: Skin is warm and dry.   Neurological:      General: No focal deficit present.      Mental Status: He is alert.   Psychiatric:         Mood and Affect: Mood normal.         Behavior: Behavior normal.           Significant Labs: All pertinent labs within the past 24 hours have  been reviewed.    Significant Imaging: I have reviewed all pertinent imaging results/findings within the past 24 hours.

## 2023-06-19 NOTE — ASSESSMENT & PLAN NOTE
Unknown Etiology. Will consult general surgery. HIDA scan ordered after discussion of case with Surgeon. If HIDA scan negative, will consider repeat CT with IV contrast but this puts patient at increased risk of further kidney injury.    6/19/23: Patient reports resolution but bowel sounds are hypoactive today. Will reassess later today to see if they are normal. Based on this finding and patients reduced appetite in addition to recent repeat CT on 6/18/23 suggesting possible forming SBO vs ileus, concern that patient may be developing a SBO or ileus, either as a result of recent ABX use or secondary to cause of leukocytosis and abdominal pain that originally led to patient's current hospitalization.

## 2023-06-19 NOTE — NURSING
Dr Mora calls to follow up on patient status over the weekend.  Informed him patient is tolerating clear liquid diet, passing soft stools, and denies abdominal pain even with palpation.  A low residue diet was ordered and will follow up with toleration later today.

## 2023-06-19 NOTE — HOSPITAL COURSE
HPI: Patient is an 82 year old male who presented to the ED on 6/15/23 with acute RLQ abdominal pain for 2 days. Patient was found to have leukocytosis with a WBC count over 20 with a left shift as well as severe lactic acidosis with an initial lactate over 6.0. He was given Zosyn, pain medication, ceftriaxone, and IV fluids. Lactic acid did improve with fluids and pain was initially not controlled with morphine but did improve with Dilaudid. UA negative, Chest X-ray negative and CT did not demonstrate any significant abnormalities to suggest the etiology of the abnormal labs and RLQ pain.      6/16/23: Placed in observation overnight  and IV fluids continued. This morning patient is feeling a little better but still having significant pain. WBC improved to 20 from 22 overnight and Lactate improved to 2.3.     6/17- pt seen and examined this morning. Continues to have abd pain.but slightly better than yesterday  . Also reports traces of fresh blood in stool, noticed this morning, afebrile. Hida scan shows mild dyskinesia otherwise nl. Surgery is on board . vss     6/18/2023- seen and examined this morning. No acute events overnight. Vss. Abd has almost resolved    6/19/23: Patient feels better. Abdominal pain resolved. Bowel movement this morning but patient denies passing gas since. Reduced appetite. White count back to WNL.     6/20/23: Patient feeling better this morning. Feels ready to go home. Bowel sounds normal in all quadrants.     Physical Exam  Vitals reviewed.   Constitutional:       General: He is not in acute distress.     Appearance: Normal appearance. He is not ill-appearing.   Eyes:      Extraocular Movements: EOM normal.      Pupils: Pupils are equal, round, and reactive to light.   Cardiovascular:      Rate and Rhythm: Normal rate and regular rhythm.      Pulses: Normal pulses.      Heart sounds: Normal heart sounds. No murmur heard.    No friction rub. No gallop.   Pulmonary:      Effort: No  respiratory distress.      Breath sounds: No wheezing, rhonchi or rales.   Abdominal:      General: Bowel sounds are normal. There is no distension.      Palpations: There is no mass.      Tenderness: There is no abdominal tenderness There is no rebound.      Hernia: No hernia is present.      Comments: mild distension   Genitourinary:     Testes: Normal.   Musculoskeletal:         General: No swelling.      Right lower leg: No edema.      Left lower leg: No edema.   Skin:     General: Skin is warm and dry.   Neurological:      General: No focal deficit present.      Mental Status: He is alert.   Psychiatric:         Mood and Affect: Mood normal.         Behavior: Behavior normal.

## 2023-06-19 NOTE — PROGRESS NOTES
06/19/23 0753   Discharge Assessment   Assessment Type Discharge Planning Reassessment   Confirmed/corrected address, phone number and insurance Yes   Confirmed Demographics Correct on Facesheet   Source of Information patient   People in Home spouse   Do you expect to return to your current living situation? Yes   Do you have help at home or someone to help you manage your care at home? No   Prior to hospitilization cognitive status: Alert/Oriented   Current cognitive status: Alert/Oriented   Walking or Climbing Stairs ambulation difficulty, requires equipment   Mobility Management Single Cane   Equipment Currently Used at Home cane, straight   Readmission within 30 days? No   Patient currently being followed by outpatient case management? No   Do you currently have service(s) that help you manage your care at home? No   Do you take prescription medications? Yes   Do you have prescription coverage? Yes   Do you have any problems affording any of your prescribed medications? No   Is the patient taking medications as prescribed? yes   How do you get to doctors appointments? family or friend will provide   Are you on dialysis? No   Do you take coumadin? No   Discharge Plan A Home with family

## 2023-06-20 PROBLEM — N17.9 AKI (ACUTE KIDNEY INJURY): Status: RESOLVED | Noted: 2023-01-01 | Resolved: 2023-01-01

## 2023-06-20 PROBLEM — R10.31 RIGHT LOWER QUADRANT ABDOMINAL PAIN: Status: RESOLVED | Noted: 2023-01-01 | Resolved: 2023-01-01

## 2023-06-20 PROBLEM — E87.20 LACTIC ACIDOSIS: Status: RESOLVED | Noted: 2023-01-01 | Resolved: 2023-01-01

## 2023-06-20 PROBLEM — E86.0 DEHYDRATION: Status: RESOLVED | Noted: 2023-01-01 | Resolved: 2023-01-01

## 2023-06-20 PROBLEM — D72.829 LEUKOCYTOSIS: Status: RESOLVED | Noted: 2023-01-01 | Resolved: 2023-01-01

## 2023-06-20 NOTE — NURSING
"Pt sat up on edge of bed to take AM medications. Tachypnea and increased WOB noted. SpO2 97% on RA. Lungs CTA. Pt reports this happens when he "gets weak." Once patient put self back supine in bed, SOB decreased. Instructed pt to notify nurse if SOB increases. CBIR.    "

## 2023-06-20 NOTE — DISCHARGE SUMMARY
[facility] - Medical Surgical Unit  [facility] Medicine  Discharge Summary      Patient Name: Supa Carmen  MRN: [id number]  [facility]: [id number]  Patient Class: IP- Inpatient  Admission Date: 6/15/2023  Hospital Length of Stay: 4 days  Discharge Date and Time:  06/20/2023 8:19 AM  Attending Physician: Randy Evans DO   Discharging Provider: Randy Evans DO  Primary Care Provider: Randy Evans DO    Primary Care Team: Networked reference to record PCT     HPI:   Patient is an 82 year old male who presented to the ED on 6/15/23 with acute RLQ abdominal pain for 2 days. Patient was found to have leukocytosis with a WBC count over 20 with a left shift as well as severe lactic acidosis with an initial lactate over 6.0. He was given Zosyn, pain medication, ceftriaxone, and IV fluids. Lactic acid did improve with fluids and pain was initially not controlled with morphine but did improve with Dilaudid. UA negative, Chest X-ray negative and CT did not demonstrate any significant abnormalities to suggest the etiology of the abnormal labs and RLQ pain.     6/16/23: Placed in observation overnight  and IV fluids continued. This morning patient is feeling a little better but still having significant pain. WBC improved to 20 from 22 overnight and Lactate improved to 2.3.       * No surgery found *      Hospital Course:   HPI: Patient is an 82 year old male who presented to the ED on 6/15/23 with acute RLQ abdominal pain for 2 days. Patient was found to have leukocytosis with a WBC count over 20 with a left shift as well as severe lactic acidosis with an initial lactate over 6.0. He was given Zosyn, pain medication, ceftriaxone, and IV fluids. Lactic acid did improve with fluids and pain was initially not controlled with morphine but did improve with Dilaudid. UA negative, Chest X-ray negative and CT did not demonstrate any significant abnormalities to suggest the etiology of the abnormal labs and RLQ pain.       6/16/23: Placed in observation overnight  and IV fluids continued. This morning patient is feeling a little better but still having significant pain. WBC improved to 20 from 22 overnight and Lactate improved to 2.3.     6/17- pt seen and examined this morning. Continues to have abd pain.but slightly better than yesterday  . Also reports traces of fresh blood in stool, noticed this morning, afebrile. Hida scan shows mild dyskinesia otherwise nl. Surgery is on board . vss     6/18/2023- seen and examined this morning. No acute events overnight. Vss. Abd has almost resolved    6/19/23: Patient feels better. Abdominal pain resolved. Bowel movement this morning but patient denies passing gas since. Reduced appetite. White count back to WNL.     6/20/23: Patient feeling better this morning. Feels ready to go home. Bowel sounds normal in all quadrants.     Physical Exam  Vitals reviewed.   Constitutional:       General: He is not in acute distress.     Appearance: Normal appearance. He is not ill-appearing.   Eyes:      Extraocular Movements: EOM normal.      Pupils: Pupils are equal, round, and reactive to light.   Cardiovascular:      Rate and Rhythm: Normal rate and regular rhythm.      Pulses: Normal pulses.      Heart sounds: Normal heart sounds. No murmur heard.    No friction rub. No gallop.   Pulmonary:      Effort: No respiratory distress.      Breath sounds: No wheezing, rhonchi or rales.   Abdominal:      General: Bowel sounds are normal. There is no distension.      Palpations: There is no mass.      Tenderness: There is no abdominal tenderness There is no rebound.      Hernia: No hernia is present.      Comments: mild distension   Genitourinary:     Testes: Normal.   Musculoskeletal:         General: No swelling.      Right lower leg: No edema.      Left lower leg: No edema.   Skin:     General: Skin is warm and dry.   Neurological:      General: No focal deficit present.      Mental Status: He is alert.    Psychiatric:         Mood and Affect: Mood normal.         Behavior: Behavior normal.        Goals of Care Treatment Preferences:  Code Status: Full Code      Consults:   Consults (From admission, onward)        Status Ordering Provider     Inpatient consult to General Surgery  Once        Provider:  Marybeth Mora MD    Acknowledged JAYCE ECHEVARRIA          Renal/  Lactic acidosis-resolved as of 6/20/2023  Unknown etiology. Responding to fluids.     6/19/23: resolved    Oncology  Leukocytosis-resolved as of 6/20/2023  Improved somewhat overnight. Will start patient on Flagyl and restart Zosyn.     6/19/23: Resolved. Will D/C ABXs.       GI  * Right lower quadrant abdominal pain-resolved as of 6/20/2023  Unknown Etiology. Will consult general surgery. HIDA scan ordered after discussion of case with Surgeon. If HIDA scan negative, will consider repeat CT with IV contrast but this puts patient at increased risk of further kidney injury.    6/19/23: Patient reports resolution but bowel sounds are hypoactive today. Will reassess later today to see if they are normal. Based on this finding and patients reduced appetite in addition to recent repeat CT on 6/18/23 suggesting possible forming SBO vs ileus, concern that patient may be developing a SBO or ileus, either as a result of recent ABX use or secondary to cause of leukocytosis and abdominal pain that originally led to patient's current hospitalization.         Final Active Diagnoses:    Diagnosis Date Noted POA    Hypertension [I10] 02/16/2023 Yes      Problems Resolved During this Admission:    Diagnosis Date Noted Date Resolved POA    PRINCIPAL PROBLEM:  Right lower quadrant abdominal pain [R10.31] 06/16/2023 06/20/2023 Yes    YOGESH (acute kidney injury) [N17.9] 06/16/2023 06/20/2023 Yes    Dehydration [E86.0] 06/15/2023 06/20/2023 Yes    Lactic acidosis [E87.20] 06/15/2023 06/20/2023 Yes    Leukocytosis [D72.829] 06/15/2023 06/20/2023 Yes        Discharged Condition: good    Disposition:     Follow Up:   Follow-up Information     Randy Evans DO Follow up on 6/20/2023.    Specialty: Family Medicine  Contact information:  [address] [facility] [address]  [phone]             Randy Evans DO. Call today.    Specialty: Family Medicine  Contact information:  [address] [facility] [address]  [phone]                       Patient Instructions:   No discharge procedures on file.    Significant Diagnostic Studies: N/A    Pending Diagnostic Studies:     Procedure Component Value Units Date/Time    Occult blood x 1, stool [[id number]] Collected: 06/18/23 0659    Order Status: Sent Lab Status: No result     Specimen: Stool          Medications:  Reconciled Home Medications:      Medication List      CONTINUE taking these medications    atenoloL 25 MG tablet  Commonly known as: TENORMIN  TAKE 1 TABLET BY MOUTH EVERY DAY     gabapentin 300 MG capsule  Commonly known as: NEURONTIN  Take 1 capsule (300 mg total) by mouth 3 (three) times daily.     losartan 50 MG tablet  Commonly known as: COZAAR  TAKE 1 TABLET BY MOUTH EVERY DAY            Indwelling Lines/Drains at time of discharge:   Lines/Drains/Airways     None                 Time spent on the discharge of patient: 35 minutes         Randy Evans DO  Department of Hospital Medicine  [facility] - Medical Surgical Unit

## 2023-06-20 NOTE — PLAN OF CARE
Problem: Adult Inpatient Plan of Care  Goal: Plan of Care Review  Outcome: Met  Goal: Patient-Specific Goal (Individualized)  Outcome: Met  Goal: Absence of Hospital-Acquired Illness or Injury  Outcome: Met  Goal: Optimal Comfort and Wellbeing  Outcome: Met  Goal: Readiness for Transition of Care  Outcome: Met     Problem: Skin Injury Risk Increased  Goal: Skin Health and Integrity  Outcome: Met     Problem: Hypertension Comorbidity  Goal: Blood Pressure in Desired Range  Outcome: Met     Problem: Fall Injury Risk  Goal: Absence of Fall and Fall-Related Injury  Outcome: Met     Problem: Pain Acute  Goal: Acceptable Pain Control and Functional Ability  Outcome: Met     Problem: Fatigue  Goal: Improved Activity Tolerance  Outcome: Met     Problem: Fluid Volume Deficit  Goal: Fluid Balance  Outcome: Met     Problem: Infection  Goal: Absence of Infection Signs and Symptoms  Outcome: Met     Problem: Fluid and Electrolyte Imbalance (Acute Kidney Injury/Impairment)  Goal: Fluid and Electrolyte Balance  Outcome: Met     Problem: Oral Intake Inadequate (Acute Kidney Injury/Impairment)  Goal: Optimal Nutrition Intake  Outcome: Met     Problem: Renal Function Impairment (Acute Kidney Injury/Impairment)  Goal: Effective Renal Function  Outcome: Met

## 2023-06-21 NOTE — PROGRESS NOTES
C3 nurse spoke with Supa Carmen wife for a TCC post hospital discharge follow up call. The patient has a scheduled HOSFU appointment with Randy Evans DO  on 6/27/23 @ 8am.

## 2023-06-25 NOTE — H&P
Hospital Medicine History & Physical Examination       Patient Name: Supa Carmen  MRN: [id number]  Patient Class: IP- Inpatient   Admission Date: 6/25/2023   Admitting Physician:  Service   Length of Stay: 0  Attending Physician: Bethany Campbell MD  Primary Care Provider: Randy Evans DO  Face-to-Face encounter date: 06/25/2023  Code Status:full  Chief Complaint: Abdominal Pain (Abd pain generalized, denies any nausea and vomiting, has been having loose stool. Discharged from hospital on Friday with diagnoses of leukocytosis and elevated lactic acid level.)        Patient information was obtained from patient, patient's family, past medical records and ER records.  ED records were reviewed in detail and documented below    HISTORY OF PRESENT ILLNESS:   Supa Carmen is a 83 y.o. male who is a pt of   has a past medical history of CAD (coronary artery disease), Chemotherapy-induced neuropathy, Diverticulosis, DJD (degenerative joint disease), Gout, History of colon cancer, stage III (05/20/2011), HLD (hyperlipidemia), HTN (hypertension), and Personal history of colonic polyps (10/24/2017).. The patient presented to [facility] on 6/25/2023 with a primary complaint of right sided abdominal pain that started last night, severe in intensity , no radiation, no aggravating or relieving factors. Last BM this morning . No nausea or vomiting . Ct abd revealed terminal ileitis . Was started on zosyn and flagyl and admitted to floor .       PAST MEDICAL HISTORY:     Past Medical History:   Diagnosis Date    CAD (coronary artery disease)     Chemotherapy-induced neuropathy     Diverticulosis     DJD (degenerative joint disease)     Gout     History of colon cancer, stage III 05/20/2011    HLD (hyperlipidemia)     HTN (hypertension)     Personal history of colonic polyps 10/24/2017    s/p polypectomy - Dr Kenny Vargas       PAST SURGICAL HISTORY:     Past Surgical History:   Procedure Laterality  Date    CARDIAC CATHETERIZATION  10/14/2009    Dr Tim Bryan    COLONOSCOPY W/ BIOPSIES AND POLYPECTOMY  05/30/2012    3mm polyp - Dr Kenny Vargas    COLONOSCOPY W/ BIOPSIES AND POLYPECTOMY  10/24/2017    2 polyps - Dr Kenny Vargas    COLONOSCOPY W/ BIOPSIES AND POLYPECTOMY  07/22/2014    2 polyps - Dr Kenny Vargas    HEMICOLECTOMY, RIGHT, LAPAROSCOPIC  05/20/2011    Dr Elijah Huntley    LUMBAR DISCECTOMY      MEDIPORT INSERTION, SINGLE  06/16/2011    Dr Elijah Huntley       ALLERGIES:   Patient has no known allergies.    FAMILY HISTORY:   Reviewed and negative    SOCIAL HISTORY:     Social History     Tobacco Use    Smoking status: Former     Types: Cigarettes    Smokeless tobacco: Never   Substance Use Topics    Alcohol use: Yes        HOME MEDICATIONS:     Prior to Admission medications    Medication Sig Start Date End Date Taking? Authorizing Provider   atenoloL (TENORMIN) 25 MG tablet TAKE 1 TABLET BY MOUTH EVERY DAY 5/2/23   Randy Evans DO   gabapentin (NEURONTIN) 300 MG capsule Take 1 capsule (300 mg total) by mouth 3 (three) times daily. 3/20/23   Randy Evans DO   losartan (COZAAR) 50 MG tablet TAKE 1 TABLET BY MOUTH EVERY DAY 12/27/22   Randy Evans DO       REVIEW OF SYSTEMS:   Except as documented, all other systems reviewed and negative     PHYSICAL EXAM:     VITAL SIGNS: 24 HRS MIN & MAX LAST   Temp  Min: 97.3 °F (36.3 °C)  Max: 98.9 °F (37.2 °C) 98.8 °F (37.1 °C)   BP  Min: 97/56  Max: 139/70 (!) 97/56   Pulse  Min: 62  Max: 98  62   Resp  Min: 18  Max: 18 18   SpO2  Min: 96 %  Max: 98 % 97 %       General appearance: Well-developed, well-nourished male in no apparent distress.  HENT: Atraumatic head. Moist mucous membranes of oral cavity.  Eyes: Normal extraocular movements.   Neck: Supple.   Lungs: Clear to auscultation bilaterally. No wheezing present.   Heart: Regular rate and rhythm. S1 and S2 present with no murmurs/gallop/rub. No pedal edema. No JVD present.    Abdomen: Soft, +tender to palpation in RUQ. No rebound tenderness/guarding. Bowel sounds are normal.   Extremities: No cyanosis, clubbing, or edema.  Skin: No Rash.   Neuro: Motor and sensory exams grossly intact. Good tone. Muscle strength 5/5 in all 4 extremities  Psych/mental status: Appropriate mood and affect. Responds appropriately to questions.     LABS AND IMAGING:     Recent Labs   Lab 06/19/23 0445 06/25/23  0902   WBC 7.85 23.96*   RBC 3.94* 5.22   HGB 11.3* 14.8   HCT 35.7* 43.5   MCV 90.6 83.3   MCH 28.7 28.4   MCHC 31.7* 34.0   RDW 14.6 14.3    287   MPV 10.5* 9.4       Recent Labs   Lab 06/19/23 0445 06/25/23  0902    138   K 3.8 3.0*   CO2 13* 19*   BUN 14.0 7.0*   CREATININE 1.20* 1.07   CALCIUM 7.7* 8.4*   ALBUMIN  --  2.8*   ALKPHOS  --  38*   ALT  --  16   AST  --  19   BILITOT  --  0.8       Microbiology Results (last 7 days)       Procedure Component Value Units Date/Time    Stool Culture **CANNOT BE ORDERED STAT** [[id number]]     Order Status: Sent Specimen: Stool     Blood Culture #1 **CANNOT BE ORDERED STAT** [[id number]] Collected: 06/25/23 1105    Order Status: Resulted Specimen: Blood from Antecubital, Left Updated: 06/25/23 1111    Blood Culture #2 **CANNOT BE ORDERED STAT** [[id number]] Collected: 06/25/23 1050    Order Status: Resulted Specimen: Blood from Forearm, Left Updated: 06/25/23 1109             CT Abdomen Pelvis  Without Contrast  Narrative: EXAMINATION:  CT ABDOMEN PELVIS WITHOUT CONTRAST    CLINICAL HISTORY:  Abdominal pain, acute, nonlocalized;    TECHNIQUE:  Helically acquired axial images, sagittal and coronal reformations were obtained from the lung bases to the pubic symphysis without the IV administration of contrast.    Automated tube current modulation, weight-based exposure dosing, and/or iterative reconstruction technique utilized to reach lowest reasonably achievable exposure rate.    DLP: 888 mGy*cm    COMPARISON:  CT abdomen pelvis  06/18/2023    FINDINGS:  HEART: There are coronary artery calcifications.    LUNG BASES: Trace pleural effusions.    LIVER: Normal attenuation. No appreciable focal hepatic lesion.    BILIARY: No calcified gallstones.    PANCREAS: No inflammatory change.    SPLEEN: Normal in size    ADRENALS: No mass.    KIDNEYS/URETERS: Nonobstructing nephrolithiasis.  No hydronephrosis.  Incidental renal cyst appear simple by noncontrast evaluation.  Simple cysts require no imaging follow-up per consensus guidelines.    GI TRACT/MESENTERY: Evaluation of the bowel is limited without contrast.  Small sliding hiatal hernia. There are postsurgical changes of right hemicolectomy.  Edematous appearance of the francis terminal ileum in the right hemiabdomen.  Small bowel loops are fluid-filled.  Dilatation is improved compared to previous.  There is mesenteric edema involving the francis terminal ileum.    PERITONEUM: Small volume free intraperitoneal fluid.No free air.    LYMPH NODES: Presumed reactive mesenteric lymph nodes at the francis terminal ileum.    VASCULATURE: Aortoiliac atherosclerosis.    BLADDER: Normal appearance given degree of distention.    REPRODUCTIVE ORGANS: Normal as visualized.    ABDOMINAL WALL: Unremarkable.    BONES: Severe degenerative change at the lower lumbar spine with multilevel central canal and neural foraminal stenosis similar to previous.  Impression: 1. Inflammatory changes involving the terminal ileum in the right upper quadrant with edematous appearance of the bowel and mesentery and presumed reactive adjacent lymph nodes  2. Diffuse fluid distension of the bowel, slightly improved from previous.  Possibly ileus related to ileitis.  Further characterization limited without contrast  3. Trace pleural effusions and small volume free intraperitoneal fluid    Electronically signed by: Marcie Woodall  Date:    06/25/2023  Time:    10:05        ASSESSMENT & PLAN:   terminal ileitis  Possible ileus related to  ileitis  Hx of htn-now hypotensive   Sepsis 2/2 #1 -POA  Hypokalemia    Plan  Cont zosyn and flagyl  Cont ivf  Monitor closely  Labs in the am    GI prophylaxis-pepcid   Dvt prophylaxis- lovenox         __________________________________________________________________________  INPATIENT LIST OF MEDICATIONS     Scheduled Meds:   enoxparin  40 mg Subcutaneous Q12H    famotidine  20 mg Oral Daily    metronidazole  500 mg Intravenous ED 1 Time    metronidazole  500 mg Intravenous Q8H    piperacillin-tazobactam (Zosyn) IV (PEDS and ADULTS) (extended infusion is not appropriate)  4.5 g Intravenous Q8H     Continuous Infusions:   dextrose 5 % and 0.45 % NaCl with KCl 20 mEq 100 mL/hr at 06/25/23 1200     PRN Meds:.melatonin, morphine, ondansetron, sodium chloride 0.9%        All diagnosis and differential diagnosis have been reviewed; assessment and plan has been documented; I have personally reviewed the labs and test results that are presently available; I have reviewed the patients medication list; I have reviewed the consulting providers response and recommendations. I have reviewed or attempted to review medical records based upon their availability.    All of the patient and family questions have been addressed and answered. Patient's is agreeable to the above stated plan. I will continue to monitor closely and make adjustments to medical management as needed.      Bethany Campbell MD   06/25/2023

## 2023-06-25 NOTE — ED PROVIDER NOTES
Encounter Date: 6/25/2023       History     Chief Complaint   Patient presents with    Abdominal Pain     Abd pain generalized, denies any nausea and vomiting, has been having loose stool. Discharged from hospital on Friday with diagnoses of leukocytosis and elevated lactic acid level.     Abd pain generalized, denies any nausea and vomiting, has been having loose stool. Discharged from hospital on Friday with diagnoses of leukocytosis and elevated lactic acid level.    The history is provided by the patient.   Abdominal Pain  The current episode started just prior to arrival. The onset of the illness was abrupt. The abdominal pain is generalized. The severity of the abdominal pain is 7/10. The abdominal pain is relieved by nothing. The other symptoms of the illness do not include nausea, vomiting or diarrhea.   The patient states that she believes she is currently not pregnant. The patient has not had a change in bowel habit.   Review of patient's allergies indicates:  No Known Allergies  Past Medical History:   Diagnosis Date    CAD (coronary artery disease)     Chemotherapy-induced neuropathy     Diverticulosis     DJD (degenerative joint disease)     Gout     History of colon cancer, stage III 05/20/2011    HLD (hyperlipidemia)     HTN (hypertension)     Personal history of colonic polyps 10/24/2017    s/p polypectomy - Dr Kenny Vargas     Past Surgical History:   Procedure Laterality Date    CARDIAC CATHETERIZATION  10/14/2009    Dr Tim Bryan    COLONOSCOPY W/ BIOPSIES AND POLYPECTOMY  05/30/2012    3mm polyp - Dr Kenny Vargas    COLONOSCOPY W/ BIOPSIES AND POLYPECTOMY  10/24/2017    2 polyps - Dr Kenny Vargas    COLONOSCOPY W/ BIOPSIES AND POLYPECTOMY  07/22/2014    2 polyps - Dr Kenny Vargas    HEMICOLECTOMY, RIGHT, LAPAROSCOPIC  05/20/2011    Dr Elijah Huntley    LUMBAR DISCECTOMY      MEDIPORT INSERTION, SINGLE  06/16/2011    Dr Elijah Huntley     Family History   Problem Relation Age of  Onset    No Known Problems Mother     Other Father 42        intestinal rupture - Cause of death     Social History     Tobacco Use    Smoking status: Former     Types: Cigarettes    Smokeless tobacco: Never   Substance Use Topics    Alcohol use: Yes    Drug use: Never     Review of Systems   Constitutional: Negative.    HENT: Negative.     Eyes: Negative.    Respiratory: Negative.     Cardiovascular: Negative.    Gastrointestinal:  Positive for abdominal pain. Negative for diarrhea, nausea and vomiting.   Endocrine: Negative.    Genitourinary: Negative.    Musculoskeletal: Negative.    Skin: Negative.    Allergic/Immunologic: Negative.    Neurological: Negative.    Hematological: Negative.    Psychiatric/Behavioral: Negative.     All other systems reviewed and are negative.    Physical Exam     Initial Vitals [06/25/23 0852]   BP Pulse Resp Temp SpO2   131/72 98 18 97.3 °F (36.3 °C) 96 %      MAP       --         Physical Exam    Nursing note and vitals reviewed.  Constitutional: He appears well-developed and well-nourished.   HENT:   Head: Normocephalic and atraumatic.   Eyes: EOM are normal. Pupils are equal, round, and reactive to light.   Neck: Neck supple.   Normal range of motion.  Cardiovascular:  Normal rate, regular rhythm, normal heart sounds and intact distal pulses.           Pulmonary/Chest: Breath sounds normal.   Abdominal: Abdomen is soft. Bowel sounds are normal.   Musculoskeletal:         General: Normal range of motion.      Cervical back: Normal range of motion and neck supple.     Neurological: He is alert and oriented to person, place, and time. He has normal strength. GCS score is 15. GCS eye subscore is 4. GCS verbal subscore is 5. GCS motor subscore is 6.   Skin: Skin is warm and dry.   Psychiatric: He has a normal mood and affect. His behavior is normal. Judgment and thought content normal.       ED Course   Procedures  Labs Reviewed   COMPREHENSIVE METABOLIC PANEL - Abnormal; Notable for  the following components:       Result Value    Potassium Level 3.0 (*)     Chloride 108 (*)     Carbon Dioxide 19 (*)     Glucose Level 119 (*)     Blood Urea Nitrogen 7.0 (*)     Calcium Level Total 8.4 (*)     Albumin Level 2.8 (*)     Albumin/Globulin Ratio 0.9 (*)     Alkaline Phosphatase 38 (*)     All other components within normal limits   CBC WITH DIFFERENTIAL - Abnormal; Notable for the following components:    WBC 23.96 (*)     All other components within normal limits   MANUAL DIFFERENTIAL - Abnormal; Notable for the following components:    Neutrophils % 89 (*)     Lymphs % 5 (*)     Neutrophils Abs Calc 22.0432 (*)     All other components within normal limits   LIPASE - Normal   LACTIC ACID, PLASMA - Normal   BLOOD CULTURE [facility]   BLOOD CULTURE [facility]   CBC W/ AUTO DIFFERENTIAL    Narrative:     The following orders were created for panel order CBC W/ AUTO DIFFERENTIAL.  Procedure                               Abnormality         Status                     ---------                               -----------         ------                     CBC with Differential[[id number]]        Abnormal            Final result               Manual Differential[[id number]]          Abnormal            Final result                 Please view results for these tests on the individual orders.   URINALYSIS, REFLEX TO URINE CULTURE          Imaging Results              CT Abdomen Pelvis  Without Contrast (Final result)  Result time 06/25/23 10:05:40      Final result by Marcie Woodall MD (06/25/23 10:05:40)                   Impression:      1. Inflammatory changes involving the terminal ileum in the right upper quadrant with edematous appearance of the bowel and mesentery and presumed reactive adjacent lymph nodes  2. Diffuse fluid distension of the bowel, slightly improved from previous.  Possibly ileus related to ileitis.  Further characterization limited without contrast  3. Trace pleural effusions and small volume  free intraperitoneal fluid      Electronically signed by: Marcie Woodall  Date:    06/25/2023  Time:    10:05               Narrative:    EXAMINATION:  CT ABDOMEN PELVIS WITHOUT CONTRAST    CLINICAL HISTORY:  Abdominal pain, acute, nonlocalized;    TECHNIQUE:  Helically acquired axial images, sagittal and coronal reformations were obtained from the lung bases to the pubic symphysis without the IV administration of contrast.    Automated tube current modulation, weight-based exposure dosing, and/or iterative reconstruction technique utilized to reach lowest reasonably achievable exposure rate.    DLP: 888 mGy*cm    COMPARISON:  CT abdomen pelvis 06/18/2023    FINDINGS:  HEART: There are coronary artery calcifications.    LUNG BASES: Trace pleural effusions.    LIVER: Normal attenuation. No appreciable focal hepatic lesion.    BILIARY: No calcified gallstones.    PANCREAS: No inflammatory change.    SPLEEN: Normal in size    ADRENALS: No mass.    KIDNEYS/URETERS: Nonobstructing nephrolithiasis.  No hydronephrosis.  Incidental renal cyst appear simple by noncontrast evaluation.  Simple cysts require no imaging follow-up per consensus guidelines.    GI TRACT/MESENTERY: Evaluation of the bowel is limited without contrast.  Small sliding hiatal hernia. There are postsurgical changes of right hemicolectomy.  Edematous appearance of the francis terminal ileum in the right hemiabdomen.  Small bowel loops are fluid-filled.  Dilatation is improved compared to previous.  There is mesenteric edema involving the francis terminal ileum.    PERITONEUM: Small volume free intraperitoneal fluid.No free air.    LYMPH NODES: Presumed reactive mesenteric lymph nodes at the francis terminal ileum.    VASCULATURE: Aortoiliac atherosclerosis.    BLADDER: Normal appearance given degree of distention.    REPRODUCTIVE ORGANS: Normal as visualized.    ABDOMINAL WALL: Unremarkable.    BONES: Severe degenerative change at the lower lumbar spine with  multilevel central canal and neural foraminal stenosis similar to previous.                                       Medications   0.9%  NaCl infusion (has no administration in time range)   metronidazole IVPB 500 mg (has no administration in time range)   sodium chloride 0.9% bolus 250 mL 250 mL (250 mLs Intravenous New Bag 6/25/23 0940)     Medical Decision Making:   Clinical Tests:   Lab Tests: Ordered and Reviewed  Radiological Study: Ordered and Reviewed  ED Management:  On this visit, white count is now higher but the lactic acid is normal.  We now have definitive evidence of acute intra-abdominal pathology in the form of ileitis with surrounding inflammatory and edematous changes.  Family has elected to stay here as they would like Dr. Kenneth palaofx to see the patient in the morning if necessary.  There is no acute surgical condition at this time so we will consult the hospitalist for admit.  Primary care doctor is also off until tomorrow as well.           ED Course as of 06/25/23 1102   Sun Jun 25, 2023   1050 Case discussed with Dr. Campbell the hospitalist, and we will admit the patient.  There is no acute indication for surgical intervention at this time, but we will initiate IV fluids IV antibiotics and blood cultures are currently being drawn.  Patient may need a CTA of the abdomen to see if a possible vascular etiology is responsible for this patient's intra-abdominal complaints. [PG]      ED Course User Index  [PG] Antoni Yi MD                 Clinical Impression:   Final diagnoses:  [K50.00] Terminal ileitis without complication (Primary)  [K56.7, B99.9] Ileus due to infection        ED Disposition Condition    Admit Stable                Antoni Yi MD  06/25/23 1103

## 2023-06-25 NOTE — PLAN OF CARE
Problem: Adult Inpatient Plan of Care  Goal: Plan of Care Review  Outcome: Ongoing, Progressing  Goal: Patient-Specific Goal (Individualized)  Outcome: Ongoing, Progressing  Goal: Absence of Hospital-Acquired Illness or Injury  Outcome: Ongoing, Progressing  Goal: Optimal Comfort and Wellbeing  Outcome: Ongoing, Progressing  Goal: Readiness for Transition of Care  Outcome: Ongoing, Progressing     Problem: Skin Injury Risk Increased  Goal: Skin Health and Integrity  Outcome: Ongoing, Progressing     Problem: Pain Chronic (Persistent) (Comorbidity Management)  Goal: Acceptable Pain Control and Functional Ability  Outcome: Ongoing, Progressing     Problem: Fall Injury Risk  Goal: Absence of Fall and Fall-Related Injury  Outcome: Ongoing, Progressing     Problem: Pain Acute  Goal: Acceptable Pain Control and Functional Ability  Outcome: Ongoing, Progressing     Problem: Fluid Volume Deficit  Goal: Fluid Balance  Outcome: Ongoing, Progressing     Problem: Infection  Goal: Absence of Infection Signs and Symptoms  Outcome: Ongoing, Progressing     Problem: Fatigue  Goal: Improved Activity Tolerance  Outcome: Ongoing, Progressing     Problem: Hypertension Comorbidity  Goal: Blood Pressure in Desired Range  Outcome: Ongoing, Progressing     Problem: Electrolyte Imbalance  Goal: Electrolyte Balance  Outcome: Ongoing, Progressing

## 2023-06-26 PROBLEM — I10 PRIMARY HYPERTENSION: Chronic | Status: ACTIVE | Noted: 2023-01-01

## 2023-06-26 PROBLEM — K50.00 TERMINAL ILEITIS WITHOUT COMPLICATION: Status: ACTIVE | Noted: 2023-01-01

## 2023-06-26 NOTE — PROGRESS NOTES
06/26/23 0913   Discharge Assessment   Assessment Type Discharge Planning Assessment   Confirmed/corrected address, phone number and insurance Yes   Confirmed Demographics Correct on Facesheet   Source of Information patient;family   People in Home spouse   Do you expect to return to your current living situation? Yes   Do you have help at home or someone to help you manage your care at home? No   Prior to hospitilization cognitive status: Alert/Oriented   Current cognitive status: Alert/Oriented   Walking or Climbing Stairs ambulation difficulty, requires equipment   Mobility Management Straight Cane   Equipment Currently Used at Home cane, straight   Readmission within 30 days? Yes   Patient currently being followed by outpatient case management? No   Do you currently have service(s) that help you manage your care at home? No   Do you take prescription medications? Yes   Do you have prescription coverage? Yes   Coverage Medicare   Do you have any problems affording any of your prescribed medications? No   Is the patient taking medications as prescribed? yes   Who is going to help you get home at discharge? Daughter ([place]) and Spouse   How do you get to doctors appointments? car, drives self;family or friend will provide   Are you on dialysis? No   Discharge Plan A Home;Home with family   DME Needed Upon Discharge  none   Discharge Plan discussed with: Spouse/sig other;Patient;Adult children   Name(s) and Number(s) Arceliamolina Bobgloria [phone]   Transition of Care Barriers None   Physical Activity   On average, how many days per week do you engage in moderate to strenuous exercise (like a brisk walk)? 4 days   On average, how many minutes do you engage in exercise at this level? 20 min   Financial Resource Strain   How hard is it for you to pay for the very basics like food, housing, medical care, and heating? Not hard   Housing Stability   In the last 12 months, was there a time when you were not able to pay the  mortgage or rent on time? N   In the last 12 months, how many places have you lived? 1   In the last 12 months, was there a time when you did not have a steady place to sleep or slept in a shelter (including now)? N   Transportation Needs   In the past 12 months, has lack of transportation kept you from medical appointments or from getting medications? no   In the past 12 months, has lack of transportation kept you from meetings, work, or from getting things needed for daily living? No   Food Insecurity   Within the past 12 months, you worried that your food would run out before you got the money to buy more. Never true   Within the past 12 months, the food you bought just didn't last and you didn't have money to get more. Never true   Stress   Do you feel stress - tense, restless, nervous, or anxious, or unable to sleep at night because your mind is troubled all the time - these days? Not at all   Social Connections   In a typical week, how many times do you talk on the phone with family, friends, or neighbors? Three   How often do you get together with friends or relatives? Three times   How often do you attend Quaker or [employer]aritan services? More than 4   Do you belong to any clubs or organizations such as Quaker groups, unions, fraternal or athletic groups, or school groups? Yes   How often do you attend meetings of the clubs or organizations you belong to? 1 to 4   Are you , , , , never , or living with a partner?    Alcohol Use   Q1: How often do you have a drink containing alcohol? Monthly or l   Q2: How many drinks containing alcohol do you have on a typical day when you are drinking? 1 or 2   Q3: How often do you have six or more drinks on one occasion? Never   OTHER   Name(s) of People in Home Wife, Ellie- [phone]; Arcelia Carmen (Daughter) [phone]     PCP: Dr. Randy Evans  RX: [facility] in [place]

## 2023-06-26 NOTE — SUBJECTIVE & OBJECTIVE
Review of Systems   Constitutional:  Negative for fatigue and fever.   Respiratory:  Negative for chest tightness and shortness of breath.    Cardiovascular:  Negative for chest pain and leg swelling.   Gastrointestinal:  Positive for abdominal distention and abdominal pain. Negative for constipation, diarrhea and nausea.   Objective:     Vital Signs (Most Recent):  Temp: 98.4 °F (36.9 °C) (06/26/23 1152)  Pulse: (!) 59 (06/26/23 1152)  Resp: 18 (06/26/23 1152)  BP: 112/64 (06/26/23 1152)  SpO2: 98 % (06/26/23 1152) Vital Signs (24h Range):  Temp:  [97.9 °F (36.6 °C)-99.1 °F (37.3 °C)] 98.4 °F (36.9 °C)  Pulse:  [59-74] 59  Resp:  [18-20] 18  SpO2:  [94 %-98 %] 98 %  BP: ()/(56-64) 112/64     Weight: 77.3 kg (170 lb 6.7 oz)  Body mass index is 28.36 kg/m².    Intake/Output Summary (Last 24 hours) at 6/26/2023 1424  Last data filed at 6/26/2023 1304  Gross per 24 hour   Intake 2673.11 ml   Output 0 ml   Net 2673.11 ml         Physical Exam  Vitals reviewed.   Constitutional:       General: He is not in acute distress.     Appearance: Normal appearance. He is not ill-appearing.   Cardiovascular:      Rate and Rhythm: Normal rate and regular rhythm.      Pulses: Normal pulses.      Heart sounds: Normal heart sounds. No murmur heard.    No friction rub. No gallop.   Pulmonary:      Effort: No respiratory distress.      Breath sounds: No wheezing, rhonchi or rales.   Abdominal:      General: Bowel sounds are normal. There is distension.      Tenderness: There is no abdominal tenderness.   Musculoskeletal:         General: No swelling.      Right lower leg: No edema.      Left lower leg: No edema.   Skin:     General: Skin is warm and dry.   Neurological:      General: No focal deficit present.      Mental Status: He is alert.   Psychiatric:         Mood and Affect: Mood normal.         Behavior: Behavior normal.           Significant Labs: All pertinent labs within the past 24 hours have been  reviewed.    Significant Imaging: I have reviewed all pertinent imaging results/findings within the past 24 hours.

## 2023-06-26 NOTE — HOSPITAL COURSE
6/26/23: Patient and labs have improved since admission. Tolerating antibiotics. Stool studies still pending. Remains NPO  6/27/23: Colonoscopy performed this AM. Suspected inflammatory bowel disease (Crohn's disease). Biopsy samples sent to pathology. Post-op complicated by suspected aspiration pneumonitis, distended abdomen, and difficulty with NG tube placement. IV solumedrol started after Cardiac workup negative.   6/28/23: Overnight significant for patient's rhythm changing to A-fib for several hours. Treated with Metoprolol IV. Chest X-ray showed developing infiltrates as expected following aspiration event. Supplemental oxygen required overnight. 500cc fluid bolus of NS given to help with hypotension. Spontaneously converted back to sinus rhythm. This morning he is feeling better. Still NPO and on IV antibiotics. Solumedrol 20mg IV q12 started yesterday.   6/29/23: Patient has gone back into A-fib. Overall doing better. Continue NPO. Tolerating TPN. Potassium low again at 3.1, will replace and switch to TPN with potassium.   6/30/23: Doing well today. Potassium improved to 3.6. Abdomen less distended.   07/01/2023 pt awake and alert admitted with abdominal pain colonoscopy shows suspected Chron's disease, had some suspected aspiration at time of colonoscopy.  He is imroving and tolerated clear liquid diet x 2 days.  H/o Afib HR currently 100-110s,   07/02/2023 tolerating diet this am, BP on low side giving atenolol this am and will hold losartan pending his BP as day proceeds.Pt is asymptomatic  7/3/23: On cardiac diet. Doing well. Ready for discharge.       Physical Exam  Vitals reviewed.   Constitutional:       General: He is not in acute distress.     Appearance: Normal appearance. He is not ill-appearing.   Cardiovascular:      Rate and Rhythm: Normal rate and regular rhythm.      Pulses: Normal pulses.      Heart sounds: Normal heart sounds. No murmur heard.    No friction rub. No gallop.   Pulmonary:       Effort: No respiratory distress.      Breath sounds: No wheezing, rhonchi or rales.   Abdominal:      General: Bowel sounds are normoactive There is no distension.   Musculoskeletal:         General: No swelling.      Right lower leg: No edema.      Left lower leg: No edema.   Skin:     General: Skin is warm and dry.   Neurological:      General: No focal deficit present.      Mental Status: He is alert.   Psychiatric:         Mood and Affect: Mood normal.         Behavior: Behavior normal.

## 2023-06-26 NOTE — HPI
Supa Carmen is a 83 y.o. male who is a pt of   has a past medical history of CAD (coronary artery disease), Chemotherapy-induced neuropathy, Diverticulosis, DJD (degenerative joint disease), Gout, History of colon cancer, stage III (05/20/2011), HLD (hyperlipidemia), HTN (hypertension), and Personal history of colonic polyps (10/24/2017).. The patient presented to [facility] on 6/25/2023 with a primary complaint of right sided abdominal pain that started last night, severe in intensity , no radiation, no aggravating or relieving factors. Last BM this morning . No nausea or vomiting . Ct abd revealed terminal ileitis . Was started on zosyn and flagyl and admitted to floor .

## 2023-06-26 NOTE — PROGRESS NOTES
[facility] - Medical Surgical Unit  [facility] Medicine  Progress Note    Patient Name: Supa Carmen  MRN: [id number]  Patient Class: IP- Inpatient   Admission Date: 6/25/2023  Length of Stay: 1 days  Attending Physician: Randy Evans DO  Primary Care Provider: Randy Evans DO        Subjective:     Principal Problem:Terminal ileitis without complication        HPI:  Supa Carmen is a 83 y.o. male who is a pt of   has a past medical history of CAD (coronary artery disease), Chemotherapy-induced neuropathy, Diverticulosis, DJD (degenerative joint disease), Gout, History of colon cancer, stage III (05/20/2011), HLD (hyperlipidemia), HTN (hypertension), and Personal history of colonic polyps (10/24/2017).. The patient presented to [facility] on 6/25/2023 with a primary complaint of right sided abdominal pain that started last night, severe in intensity , no radiation, no aggravating or relieving factors. Last BM this morning . No nausea or vomiting . Ct abd revealed terminal ileitis . Was started on zosyn and flagyl and admitted to floor .       Overview/Hospital Course:  6/26/23: Patient and labs have improved since admission. Tolerating antibiotics. Stool studies still pending. Remains NPO          Review of Systems   Constitutional:  Negative for fatigue and fever.   Respiratory:  Negative for chest tightness and shortness of breath.    Cardiovascular:  Negative for chest pain and leg swelling.   Gastrointestinal:  Positive for abdominal distention and abdominal pain. Negative for constipation, diarrhea and nausea.   Objective:     Vital Signs (Most Recent):  Temp: 98.4 °F (36.9 °C) (06/26/23 1152)  Pulse: (!) 59 (06/26/23 1152)  Resp: 18 (06/26/23 1152)  BP: 112/64 (06/26/23 1152)  SpO2: 98 % (06/26/23 1152) Vital Signs (24h Range):  Temp:  [97.9 °F (36.6 °C)-99.1 °F (37.3 °C)] 98.4 °F (36.9 °C)  Pulse:  [59-74] 59  Resp:  [18-20] 18  SpO2:  [94 %-98 %] 98 %  BP: ()/(56-64)  112/64     Weight: 77.3 kg (170 lb 6.7 oz)  Body mass index is 28.36 kg/m².    Intake/Output Summary (Last 24 hours) at 6/26/2023 1424  Last data filed at 6/26/2023 1304  Gross per 24 hour   Intake 2673.11 ml   Output 0 ml   Net 2673.11 ml         Physical Exam  Vitals reviewed.   Constitutional:       General: He is not in acute distress.     Appearance: Normal appearance. He is not ill-appearing.   Cardiovascular:      Rate and Rhythm: Normal rate and regular rhythm.      Pulses: Normal pulses.      Heart sounds: Normal heart sounds. No murmur heard.    No friction rub. No gallop.   Pulmonary:      Effort: No respiratory distress.      Breath sounds: No wheezing, rhonchi or rales.   Abdominal:      General: Bowel sounds are normal. There is distension.      Tenderness: There is no abdominal tenderness.   Musculoskeletal:         General: No swelling.      Right lower leg: No edema.      Left lower leg: No edema.   Skin:     General: Skin is warm and dry.   Neurological:      General: No focal deficit present.      Mental Status: He is alert.   Psychiatric:         Mood and Affect: Mood normal.         Behavior: Behavior normal.           Significant Labs: All pertinent labs within the past 24 hours have been reviewed.    Significant Imaging: I have reviewed all pertinent imaging results/findings within the past 24 hours.      Assessment/Plan:      * Terminal ileitis without complication  Unknown etiology. Continue antibiotics and NPO status. Stool studies pending.       Primary hypertension  Home medications held while patient NPO. Will order PRN hydralazine.         VTE Risk Mitigation (From admission, onward)         Ordered     enoxaparin injection 40 mg  Every 12 hours         06/25/23 1120     IP VTE HIGH RISK PATIENT  Once         06/25/23 1120     Place sequential compression device  Until discontinued         06/25/23 1120                Discharge Planning   SRINI:      Code Status: Full Code   Is the  patient medically ready for discharge?:     Reason for patient still in hospital (select all that apply): Treatment  Discharge Plan A: Home, Home with family                  Randy Evans DO  Department of Hospital Medicine   [facility]

## 2023-06-26 NOTE — PROGRESS NOTES
Pharmacist Renal Dose Adjustment Note    Supa Carmen is a 83 y.o. male being treated with the medication Pepcid 20 mg PO daily    Patient Data:    Vital Signs (Most Recent):  Temp: 97.9 °F (36.6 °C) (06/26/23 0752)  Pulse: 60 (06/26/23 0752)  Resp: 18 (06/26/23 0752)  BP: 105/60 (06/26/23 0752)  SpO2: 98 % (06/26/23 0752) Vital Signs (72h Range):  Temp:  [97.3 °F (36.3 °C)-99.1 °F (37.3 °C)]   Pulse:  [60-98]   Resp:  [18-20]   BP: ()/(56-88)   SpO2:  [94 %-98 %]      Recent Labs   Lab 06/25/23  0902 06/26/23  0509   CREATININE 1.07 1.33*     Serum creatinine: 1.33 mg/dL (H) 06/26/23 0509  Estimated creatinine clearance: 39.6 mL/min (A)    Medication:Pepcid dose: 20 mg frequency BID IV will be changed to medication:Pepcid IV dose:20 mg frequency:daily    Pharmacist's Name: Precious Colvin  Pharmacist's Extension: [phone]

## 2023-06-26 NOTE — PROGRESS NOTES
Inpatient Nutrition Assessment    Admit Date: 6/25/2023   Total duration of encounter: 1 day     Nutrition Recommendation/Prescription     When medically feasible, advance diet as tolerate to GI soft.  Daily weights  If unable to advance diet, consult for PPN rec's.     Communication of Recommendations:  EMR    Nutrition Assessment     Malnutrition Assessment/Nutrition-Focused Physical Exam    Malnutrition Context: acute illness or injury  Malnutrition Level: other (see comments) (Does not meet criteria)  Energy Intake (Malnutrition): less than or equal to 50% for greater than or equal to 5 days  Weight Loss (Malnutrition): other (see comments) (Does not meet criteria)  Subcutaneous Fat (Malnutrition): other (see comments) (Does not meet criteria)  Orbital Region (Subcutaneous Fat Loss): well nourished  Upper Arm Region (Subcutaneous Fat Loss): well nourished  Thoracic and Lumbar Region: well nourished  Muscle Mass (Malnutrition): other (see comments) (Does not meet criteria)  [place] Bar Region (Muscle Loss): well nourished  Clavicle Bone Region (Muscle Loss): well nourished  Clavicle and Acromion Bone Region (Muscle Loss): well nourished  Scapular Bone Region (Muscle Loss): well nourished  Dorsal Hand (Muscle Loss): well nourished  Patellar Region (Muscle Loss): well nourished  Anterior Thigh Region (Muscle Loss): well nourished  Posterior Calf Region (Muscle Loss): well nourished  Fluid Accumulation (Malnutrition):  (Does not meet criteria)  Hand  Strength, Left (Malnutrition): Does not meet criteria  Hand  Strength, Right (Malnutrition): Does not meet criteria  A minimum of two characteristics is recommended for diagnosis of either severe or non-severe malnutrition.    Chart Review    Reason Seen: continuous nutrition monitoring    Malnutrition Screening Tool Results   Have you recently lost weight without trying?: No  Have you been eating poorly because of a decreased appetite?: No   MST Score: 0  "    Diagnosis:  Terminal Ileitis, Possible Ileus related to ileitis, Hx HTN-now hypotension, sepsis,hypokalemia    Relevant Medical History:   HTN, Chemo induced neuropathy, HLD, hx colon cancer (stage III), gout, hx colonic polyps, DJD, CAD, diverticulosis. Surgical hx noted.     Nutrition-Related Medications: Pepcid, Metronidazole, Zosyn, D5 & 0.45% NaCl with Kcl @ 100 mLhr  Calorie Containing IV Medications: no significant kcals from medications at this time    Nutrition-Related Labs:  (.) H/H 11L/32.9L Neutrophils 22.0432H K+3.3L BUN 8L Cr 1.33H Ca+ 7.6L     Diet/PN Order: Diet NPO  Oral Supplement Order: none  Tube Feeding Order: none  Appetite/Oral Intake: NPO/NPO  Factors Affecting Nutritional Intake: altered gastrointestinal function and NPO  Food/Islam/Cultural Preferences: none reported  Food Allergies: none reported       Wound(s):   Intact.    Comments    () Pt was just discharged from hospital sometime last week with obstruction. Met with pt and family.Pt has poor appetite (consuming <50% for >5 days) and intake PTA on 23.Pt denied nausea, vomiting, diarrhea or constipation. No chewing or swallowing issues. Pt and family stated #.       Anthropometrics    Height: 5' 5" (165.1 cm) Height Method: Stated  Last Weight: 77.3 kg (170 lb 6.7 oz) (23 1115) Weight Method: Bed Scale  BMI (Calculated): 28.4  BMI Classification: overweight (BMI 25-29.9)        Ideal Body Weight (IBW), Male: 136 lb     % Ideal Body Weight, Male (lb): 125.31 %                 Usual Body Weight (UBW), k.3 kg  % Usual Body Weight: 100.21     Usual Weight Provided By: patient and family/caregiver    Wt Readings from Last 5 Encounters:   23 77.3 kg (170 lb 6.7 oz)   06/15/23 76.9 kg (169 lb 8.5 oz)   23 77.3 kg (170 lb 6.4 oz)   23 77.1 kg (170 lb)   23 78.5 kg (173 lb)     Weight Change(s) Since Admission:  Admit Weight: 77.1 kg (170 lb) (23 0852)      Estimated " Needs    Weight Used For Calorie Calculations: 77.3 kg (170 lb 6.7 oz)  Energy Calorie Requirements (kcal): 1934 kcal (30 kcal/kg)  Energy Need Method: Kcal/kg  Weight Used For Protein Calculations: 77.3 kg (170 lb 6.7 oz)  Protein Requirements: 92 gm (1.2 gm/kg)  Fluid Requirements (mL): 1933 mL (1 mL/kcal)  Temp (24hrs), Av.6 °F (37 °C), Min:97.9 °F (36.6 °C), Max:99.1 °F (37.3 °C)       Enteral Nutrition    Patient not receiving enteral nutrition at this time.    Parenteral Nutrition    Patient not receiving parenteral nutrition support at this time.    Evaluation of Received Nutrient Intake    Calories: not meeting estimated needs  Protein: not meeting estimated needs    Patient Education    Not applicable.    Nutrition Diagnosis     PES: Altered GI function related to acute illness as evidenced by dx terminal ileitis, possible ileus related to ileitis. (new)    Interventions/Goals     Intervention(s): general/healthful diet and collaboration with other providers  Goal: Consume % of meals/snacks by follow-up. (new)    Monitoring & Evaluation     Dietitian will monitor food and beverage intake, weight change, electrolyte/renal panel, glucose/endocrine profile, and gastrointestinal profile.  Nutrition Risk/Follow-Up: high (follow-up in 1-4 days)   Please consult if re-assessment needed sooner.

## 2023-06-26 NOTE — NURSING
Golytely being couriered from [facility] due to no stock in pyxis in med surg and ED. Shraddha with [facility] pharmacy said that it should be here before 6. MD aware.

## 2023-06-26 NOTE — CONSULTS
[facility] Surgical Unit  General Surgery  Consult Note    Consults  Subjective:     Chief Complaint/Reason for Admission:  Abdominal discomfort    History of Present Illness:  83-year-old white male previously evaluated and managed for complaint of abdominal pain and discomfort with associated terminal ileitis within the region of a previous colon resection.  Patient had undergone colonoscopy previously and it was up-to-date.  Stool studies have been redrawn and are pending.  CT scan shows persistent inflammation within the region of the terminal ileum.  Currently at this time patient states that his pain has completely resolved.  It began over the weekend and approximately 24 hours after admission with treatment of empiric antibiotic therapy he had noticeable improvement of his discomfort.  He has had multiple bowel movements since that time.  I have reviewed the medical record with Dr. Vargas who is the patient's previous gastroenterologist of record.  He has reviewed his previous notes upon the patient which did show some inflammatory process within the terminal ileum.  This may be recurrence of the same issues since the time of his previous bowel resection.  Currently at this time there was no imaging evidence for concern of bowel perforation however there is a persistent inflammatory process.    No current facility-administered medications on file prior to encounter.     Current Outpatient Medications on File Prior to Encounter   Medication Sig    atenoloL (TENORMIN) 25 MG tablet TAKE 1 TABLET BY MOUTH EVERY DAY    gabapentin (NEURONTIN) 300 MG capsule Take 1 capsule (300 mg total) by mouth 3 (three) times daily.    losartan (COZAAR) 50 MG tablet TAKE 1 TABLET BY MOUTH EVERY DAY       Review of patient's allergies indicates:  No Known Allergies    Past Medical History:   Diagnosis Date    CAD (coronary artery disease)     Chemotherapy-induced neuropathy     Diverticulosis     DJD  (degenerative joint disease)     Gout     History of colon cancer, stage III 05/20/2011    HLD (hyperlipidemia)     HTN (hypertension)     Personal history of colonic polyps 10/24/2017    s/p polypectomy - Dr Kenny Vargas     Past Surgical History:   Procedure Laterality Date    CARDIAC CATHETERIZATION  10/14/2009    Dr Tim Bryan    COLONOSCOPY W/ BIOPSIES AND POLYPECTOMY  05/30/2012    3mm polyp - Dr Kenny Vargas    COLONOSCOPY W/ BIOPSIES AND POLYPECTOMY  10/24/2017    2 polyps - Dr Kenny Vargas    COLONOSCOPY W/ BIOPSIES AND POLYPECTOMY  07/22/2014    2 polyps - Dr Kenny Vargas    HEMICOLECTOMY, RIGHT, LAPAROSCOPIC  05/20/2011    Dr Elijah Huntley    LUMBAR DISCECTOMY      MEDIPORT INSERTION, SINGLE  06/16/2011    Dr Elijah Huntley     Family History       Problem Relation (Age of Onset)    No Known Problems Mother    Other Father (42)          Tobacco Use    Smoking status: Former     Types: Cigarettes    Smokeless tobacco: Never   Substance and Sexual Activity    Alcohol use: Yes    Drug use: Never    Sexual activity: Not Currently     Review of Systems  Objective:     Vital Signs (Most Recent):  Temp: 98.4 °F (36.9 °C) (06/26/23 1152)  Pulse: (!) 59 (06/26/23 1152)  Resp: 18 (06/26/23 1152)  BP: 112/64 (06/26/23 1152)  SpO2: 98 % (06/26/23 1152) Vital Signs (24h Range):  Temp:  [97.9 °F (36.6 °C)-99.1 °F (37.3 °C)] 98.4 °F (36.9 °C)  Pulse:  [59-74] 59  Resp:  [18-20] 18  SpO2:  [94 %-98 %] 98 %  BP: ()/(56-64) 112/64     Weight: 77.3 kg (170 lb 6.7 oz)  Body mass index is 28.36 kg/m².      Intake/Output Summary (Last 24 hours) at 6/26/2023 1557  Last data filed at 6/26/2023 1304  Gross per 24 hour   Intake 2673.11 ml   Output 0 ml   Net 2673.11 ml       Physical Exam  Abdominal:      General: Bowel sounds are normal. There is no distension.      Palpations: Abdomen is soft. There is no mass.      Tenderness: There is no abdominal tenderness. There is no right CVA tenderness, left CVA  tenderness, guarding or rebound.      Hernia: No hernia is present.       Significant Labs:  CBC:   Recent Labs   Lab 06/26/23  0509   WBC 10.53   RBC 3.84*   HGB 11.0*   HCT 32.9*      MCV 85.7   MCH 28.6   MCHC 33.4     CMP:   Recent Labs   Lab 06/25/23  0902 06/26/23  0509   CALCIUM 8.4* 7.6*   ALBUMIN 2.8*  --     138   K 3.0* 3.3*   CO2 19* 23   BUN 7.0* 8.0*   CREATININE 1.07 1.33*   ALKPHOS 38*  --    ALT 16  --    AST 19  --    BILITOT 0.8  --      Lactic Acid: No results for input(s): LACTATE in the last 48 hours.    Significant Diagnostics:  CT: I have reviewed all pertinent results/findings within the past 24 hours.  CT scan reviewed with similar changes as previously seen with inflammation located surrounding the residual ileum .     Assessment/Plan:     Active Diagnoses:    Diagnosis Date Noted POA    PRINCIPAL PROBLEM:  Terminal ileitis without complication [K50.00] 06/26/2023 Yes    Primary hypertension [I10] 02/16/2023 Yes     Chronic      Problems Resolved During this Admission:   Consultation for evaluation with Gastroenterology Dr. Vargas has been placed and will be completed tomorrow.  Recommend continuation with empiric IV antibiotic therapy and IV hydration  Plan to order baseline inflammatory markers  We will monitor clinical abdominal exam for changes  Preoperative evaluation with anesthesia will be performed in the event that abdominal exploration is warranted    Thank you for your consult. I will follow-up with patient. Please contact us if you have any additional questions.    Marybeth Mora MD  General Surgery  [facility] Surgical Unit

## 2023-06-27 NOTE — NURSING
Pt currently leaving the floor via stretcher to go to colonoscopy. AAO x3. No abdominal pain at this time.

## 2023-06-27 NOTE — ASSESSMENT & PLAN NOTE
Unknown etiology. Continue antibiotics and NPO status. Stool studies pending.     6/27/23: Suspected Crohn's disease. Biopsy results pending. Continue Flagyl and Zosyn. Continue NPO. Will start solumedrol 20mg q12h IV.

## 2023-06-27 NOTE — NURSING
Radiology report of x-ray is that NG tube is not in place. Verified what to do based on the read with carina Arreaga and Dr Escamilla. NG tube advanced to 55 cm at this time. Air insertion and auscultation of air present. Pending new chest x-ray verification of placement.

## 2023-06-27 NOTE — NURSING
RN heard pt coughing and dry heaving in room. Upon assessment, pt tachycardic as well as tachypneic. BP was also elevated. Notable expiratory wheezes noted without auscultation. Auscultated all lung fields and found coarse expiratory wheezes. Notified Dr Evans and he came assess pt and gave order to remove NG ASAP. Removed NG and patient is resting comfortably in bed with no shivers compared to previous. No emesis or dry heaving at this time. Will notify Dr Mora due to pt's abdomen being distended and taut and being very firm upon palpation . Will CTM.

## 2023-06-27 NOTE — NURSING
Notified that pt c/o abdominal fullness and discomfort. Assessed pt and he has distention noted. Tenderness in all quadrants upon palpation and rebound tenderness noted to all quadrants as well. Bowel sounds hypoactive in all quadrants. Notified Dr Mora and he said to hold Golytely for now and notify him if pt spikes fever or has tachycardia. Relayed to night RNCarla.

## 2023-06-27 NOTE — OP NOTE
[facility] - Medical Surgical Unit  Colon Procedure  Operative Note    SUMMARY     Date of Procedure: 2023     Procedure: Procedure(s) (LRB):  COLONOSCOPY (N/A)    Surgeon(s) and Role:     * Kenny Vargas MD - Primary    Assisting Surgeon: None    Patient Name: Supa Carmen      Patient location: OPS    Patient : 1940    Patient Gender: male    Referring Physician: Self,Aaareferral     Anesthesia Provider: Abner Chand CRNA     Pre-Operative Diagnosis: Terminal ileitis of small intestine, without complications [K50.00]    Post-Operative Diagnosis: Post-Op Diagnosis Codes:     * Terminal ileitis of small intestine, without complications [K50.00]       Indications:  Abnormal CT suggestive of ileitis      Procedure:   This is a patient undergoing a diagnostic colonoscopy. Prior colonoscopy was preformed on . The procedure, indications, preparation and potential complications were explained to the patient, who indicated understanding and signed the corresponding consent forms. Patient identifications and proposed procedure were verified by the physician, the nurse and the anesthesia staff in the endoscopy suite. IV sedation was administered by the nurse anesthetist. Continuous pulse oximetry and blood pressure monitoring were used throughout the procedure. Supplemental oxygen was used. The quality of preparation was excellent. Patient was placed in left lateral decubitus position. Digital  exam was enlarged prostate. The colonoscope was introduced through the rectum and advanced under direct visualization until cecum reached. The appendiceal orifice and the ileocecal valve were identified. The terminal ileum was identified. Patience tolerance to procedure was good. The procedure was not difficult.      Limitations/Complications:  Patient vomited during procedure and had an NG tube placed  Complications: Yes - as above but maintained oxygen saturation    Findings:                  Colon:  Exam to the neoterminal ileum and surgical anastomosis was carried out without difficulty.  Prep was not such that we could observe mucosal detail in the colon except to say that there was no gross lesion.  The patient had had a previous right hemicolectomy.  The anastomosis was patent.  This appeared to be end to end anastomosis.  There was exudate present in the neoterminal ileum with the mucosa circumferentially ulcerated.  Exam was carried 20-30 cm into the neoterminal ileum with the same findings of exudate and circumferential mucosal ulceration.  Biopsies were obtained.    Other Interventions:  Placement of NG tube and biopsies of the neoterminal ileum    Diagnostic Impression:  Ileitis thought to represent Crohn's disease    Assessment & Plan:  Discuss with Dr. Randy Evans.  He is on Zosyn and Flagyl and we will continue these antibiotics.  We will add Solu-Medrol 20 mg q.8 hours IV piggyback and leave NG tube down for 48 hours then try clear liquids.    Additional Notes:  Blood loss minimal                Kenny Vargas MD  6/27/2023

## 2023-06-27 NOTE — SUBJECTIVE & OBJECTIVE
Review of Systems   Constitutional:  Positive for chills. Negative for fever.   Respiratory:  Positive for cough, shortness of breath and wheezing. Negative for chest tightness.    Cardiovascular:  Negative for chest pain and palpitations.   Gastrointestinal:  Positive for abdominal distention and abdominal pain.   Objective:     Vital Signs (Most Recent):  Temp: 99.3 °F (37.4 °C) (06/27/23 1557)  Pulse: 107 (06/27/23 1557)  Resp: 20 (06/27/23 1557)  BP: (!) 104/55 (06/27/23 1557)  SpO2: (!) 93 % (06/27/23 1557) Vital Signs (24h Range):  Temp:  [97.9 °F (36.6 °C)-99.6 °F (37.6 °C)] 99.3 °F (37.4 °C)  Pulse:  [] 107  Resp:  [18-37] 20  SpO2:  [90 %-99 %] 93 %  BP: (104-158)/(54-79) 104/55     Weight: 77.3 kg (170 lb 6.7 oz)  Body mass index is 28.36 kg/m².    Intake/Output Summary (Last 24 hours) at 6/27/2023 1715  Last data filed at 6/27/2023 1632  Gross per 24 hour   Intake 3004.69 ml   Output 500 ml   Net 2504.69 ml         Physical Exam  Vitals reviewed.   Constitutional:       General: He is not in acute distress.     Appearance: Normal appearance. He is not ill-appearing.   Cardiovascular:      Rate and Rhythm: Normal rate and regular rhythm.      Pulses: Normal pulses.      Heart sounds: Normal heart sounds. No murmur heard.    No friction rub. No gallop.   Pulmonary:      Effort: No respiratory distress.      Breath sounds: Wheezing present. No rhonchi or rales.   Abdominal:      General: Bowel sounds are decreased. There is distension.      Tenderness: There is abdominal tenderness.   Musculoskeletal:         General: No swelling.      Right lower leg: No edema.      Left lower leg: No edema.   Skin:     General: Skin is warm and dry.   Neurological:      General: No focal deficit present.      Mental Status: He is alert.   Psychiatric:         Mood and Affect: Mood normal.         Behavior: Behavior normal.           Significant Labs: All pertinent labs within the past 24 hours have been  reviewed.    Significant Imaging: I have reviewed all pertinent imaging results/findings within the past 24 hours.

## 2023-06-27 NOTE — PLAN OF CARE
Problem: Adult Inpatient Plan of Care  Goal: Plan of Care Review  Outcome: Ongoing, Progressing  Goal: Patient-Specific Goal (Individualized)  Outcome: Ongoing, Progressing  Goal: Absence of Hospital-Acquired Illness or Injury  Outcome: Ongoing, Progressing  Goal: Optimal Comfort and Wellbeing  Outcome: Ongoing, Progressing  Goal: Readiness for Transition of Care  Outcome: Ongoing, Progressing     Problem: Skin Injury Risk Increased  Goal: Skin Health and Integrity  Outcome: Ongoing, Progressing     Problem: Pain Chronic (Persistent) (Comorbidity Management)  Goal: Acceptable Pain Control and Functional Ability  Outcome: Ongoing, Progressing     Problem: Fall Injury Risk  Goal: Absence of Fall and Fall-Related Injury  Outcome: Ongoing, Progressing     Problem: Pain Acute  Goal: Acceptable Pain Control and Functional Ability  Outcome: Ongoing, Progressing     Problem: Fluid Volume Deficit  Goal: Fluid Balance  Outcome: Ongoing, Progressing     Problem: Infection  Goal: Absence of Infection Signs and Symptoms  Outcome: Ongoing, Progressing     Problem: Fatigue  Goal: Improved Activity Tolerance  Outcome: Ongoing, Progressing     Problem: Hypertension Comorbidity  Goal: Blood Pressure in Desired Range  Outcome: Ongoing, Progressing     Problem: Electrolyte Imbalance  Goal: Electrolyte Balance  Outcome: Ongoing, Progressing     Problem: Diarrhea  Goal: Fluid and Electrolyte Balance  Outcome: Ongoing, Progressing

## 2023-06-27 NOTE — NURSING
Pt placed on telemetry per orders by Dr. Mora. Notified Dr. Mora of current vitals (BP: 142/68, HR: 67, RR: 20, SpO2: 95%, and T: 99.6 (o)), c/o RLQ pain, and hypoactive BS in RUQ and RLQ. Order placed for Dilaudid 0.5mg IVP q 6hrs PRN pain. Wife and daughter request nurse also notify Dr. Evans of current vitals. Discussed with physicians and family that PRN Hydralazine can be given IVP if SBP > 160. Family verbalized understanding.

## 2023-06-27 NOTE — ANESTHESIA POSTPROCEDURE EVALUATION
Anesthesia Post Evaluation    Patient: Supa Carmen    Procedure(s) Performed: Procedure(s) (LRB):  COLONOSCOPY (N/A)    Final Anesthesia Type: MAC      Patient participation: Yes- Able to Participate  Level of consciousness: awake and alert  Post-procedure vital signs: reviewed and stable  Pain management: adequate  Airway patency: patent    PONV status at discharge: No PONV  Anesthetic complications: no      Cardiovascular status: blood pressure returned to baseline  Respiratory status: unassisted  Hydration status: euvolemic  Follow-up not needed.          Vitals Value Taken Time   /64 06/27/23 1100   Temp 36.8 °C (98.2 °F) 06/27/23 1100   Pulse 74 06/27/23 1100   Resp 20 06/27/23 1100   SpO2 95% 06/27/23 1100         No case tracking events are documented in the log.      Pain/Freedom Score: Pain Rating Prior to Med Admin: 5 (6/27/2023  7:02 AM)  Pain Rating Post Med Admin: 1 (6/27/2023  7:32 AM)

## 2023-06-27 NOTE — NURSING
Pt return to floor from surgery via stretcher. Pt AAO x3. No pain complaints at this time. NG tube noted to right nares, connected to low intermittent suction at this time. Will CTM.

## 2023-06-27 NOTE — PROGRESS NOTES
[facility] - Medical Surgical Unit  [facility] Medicine  Progress Note    Patient Name: Supa Carmen  MRN: [id number]  Patient Class: IP- Inpatient   Admission Date: 6/25/2023  Length of Stay: 2 days  Attending Physician: Randy Evans DO  Primary Care Provider: Randy Evans DO        Subjective:     Principal Problem:Terminal ileitis without complication        HPI:  Supa Carmen is a 83 y.o. male who is a pt of   has a past medical history of CAD (coronary artery disease), Chemotherapy-induced neuropathy, Diverticulosis, DJD (degenerative joint disease), Gout, History of colon cancer, stage III (05/20/2011), HLD (hyperlipidemia), HTN (hypertension), and Personal history of colonic polyps (10/24/2017).. The patient presented to [facility] on 6/25/2023 with a primary complaint of right sided abdominal pain that started last night, severe in intensity , no radiation, no aggravating or relieving factors. Last BM this morning . No nausea or vomiting . Ct abd revealed terminal ileitis . Was started on zosyn and flagyl and admitted to floor .       Overview/Hospital Course:  6/26/23: Patient and labs have improved since admission. Tolerating antibiotics. Stool studies still pending. Remains NPO  6/27/23: Colonoscopy performed this AM. Suspected inflammatory bowel disease (Crohn's disease). Biopsy samples sent to pathology. Post-op complicated by suspected aspiration pneumonitis, distended abdomen, and difficulty with NG tube placement. IV solumedrol started after Cardiac workup negative.           Review of Systems   Constitutional:  Positive for chills. Negative for fever.   Respiratory:  Positive for cough, shortness of breath and wheezing. Negative for chest tightness.    Cardiovascular:  Negative for chest pain and palpitations.   Gastrointestinal:  Positive for abdominal distention and abdominal pain.   Objective:     Vital Signs (Most Recent):  Temp: 99.3 °F (37.4 °C) (06/27/23  1557)  Pulse: 107 (06/27/23 1557)  Resp: 20 (06/27/23 1557)  BP: (!) 104/55 (06/27/23 1557)  SpO2: (!) 93 % (06/27/23 1557) Vital Signs (24h Range):  Temp:  [97.9 °F (36.6 °C)-99.6 °F (37.6 °C)] 99.3 °F (37.4 °C)  Pulse:  [] 107  Resp:  [18-37] 20  SpO2:  [90 %-99 %] 93 %  BP: (104-158)/(54-79) 104/55     Weight: 77.3 kg (170 lb 6.7 oz)  Body mass index is 28.36 kg/m².    Intake/Output Summary (Last 24 hours) at 6/27/2023 1715  Last data filed at 6/27/2023 1632  Gross per 24 hour   Intake 3004.69 ml   Output 500 ml   Net 2504.69 ml         Physical Exam  Vitals reviewed.   Constitutional:       General: He is not in acute distress.     Appearance: Normal appearance. He is not ill-appearing.   Cardiovascular:      Rate and Rhythm: Normal rate and regular rhythm.      Pulses: Normal pulses.      Heart sounds: Normal heart sounds. No murmur heard.    No friction rub. No gallop.   Pulmonary:      Effort: No respiratory distress.      Breath sounds: Wheezing present. No rhonchi or rales.   Abdominal:      General: Bowel sounds are decreased. There is distension.      Tenderness: There is abdominal tenderness.   Musculoskeletal:         General: No swelling.      Right lower leg: No edema.      Left lower leg: No edema.   Skin:     General: Skin is warm and dry.   Neurological:      General: No focal deficit present.      Mental Status: He is alert.   Psychiatric:         Mood and Affect: Mood normal.         Behavior: Behavior normal.           Significant Labs: All pertinent labs within the past 24 hours have been reviewed.    Significant Imaging: I have reviewed all pertinent imaging results/findings within the past 24 hours.      Assessment/Plan:      * Terminal ileitis without complication  Unknown etiology. Continue antibiotics and NPO status. Stool studies pending.     6/27/23: Suspected Crohn's disease. Biopsy results pending. Continue Flagyl and Zosyn. Continue NPO. Will start solumedrol 20mg q12h IV.        Primary hypertension  Home medications held while patient NPO. Will order PRN hydralazine.         VTE Risk Mitigation (From admission, onward)         Ordered     enoxaparin injection 40 mg  Every 12 hours         06/25/23 1120     IP VTE HIGH RISK PATIENT  Once         06/25/23 1120     Place sequential compression device  Until discontinued         06/25/23 1120                Discharge Planning   SRINI:      Code Status: Full Code   Is the patient medically ready for discharge?:     Reason for patient still in hospital (select all that apply): Treatment  Discharge Plan A: Home, Home with family                  Randy Evans DO  Department of Hospital Medicine   [facility] - Medical Surgical Unit

## 2023-06-27 NOTE — ADDENDUM NOTE
Addendum  created 06/27/23 1556 by Abner Chand CRNA    Clinical Note Signed, Intraprocedure Event edited

## 2023-06-27 NOTE — NURSING
Started pt's first Potassium rider @ 100 mL/hr. Pt stated burning going into IV site. Decreased rate to 50 mL/hr-- pt still reported burning, decreased rate to 25-- pt still reported burning. Current rate is 15 mL/hr and pt denies any burning.

## 2023-06-27 NOTE — ANESTHESIA PREPROCEDURE EVALUATION
06/27/2023  Supa Carmen is a 83 y.o., male.      Pre-op Assessment    I have reviewed the Patient Summary Reports.     I have reviewed the Nursing Notes. I have reviewed the NPO Status.   I have reviewed the Medications.     Review of Systems  Anesthesia Hx:  No problems with previous Anesthesia  Denies Family Hx of Anesthesia complications.   Denies Personal Hx of Anesthesia complications.   Social:  Non-Smoker    Cardiovascular:   Hypertension CAD      Pulmonary:  Pulmonary Normal    Renal/:  Renal/ Normal     Hepatic/GI:  Hepatic/GI Normal    Musculoskeletal:   Arthritis     Neurological:  Neurology Normal    Endocrine:  Endocrine Normal    Psych:  Psychiatric Normal           Physical Exam  General: Well nourished, Cooperative, Alert and Oriented    Airway:  Mallampati: II   Mouth Opening: Normal  TM Distance: Normal  Tongue: Normal  Neck ROM: Normal ROM    Dental:  Intact    Chest/Lungs:  Normal Respiratory Rate    Heart:  Rate: Normal    Musculoskeletal:Normal mobility      Anesthesia Plan  Type of Anesthesia, risks & benefits discussed:    Anesthesia Type: MAC  Intra-op Monitoring Plan: Standard ASA Monitors  Post Op Pain Control Plan: multimodal analgesia  Induction:  IV  Informed Consent: Informed consent signed with the Patient and all parties understand the risks and agree with anesthesia plan.  All questions answered.   ASA Score: 3  Day of Surgery Review of History & Physical: H&P Update referred to the surgeon/provider.  Anesthesia Plan Notes: Anesthesia plan was discussed with patient and/or representative. Risks and alternatives were discussed including the possibility of alteration of plan.     Ready For Surgery From Anesthesia Perspective.     .

## 2023-06-28 PROBLEM — T17.908A ASPIRATION INTO RESPIRATORY TRACT: Status: ACTIVE | Noted: 2023-01-01

## 2023-06-28 NOTE — PLAN OF CARE
Problem: Adult Inpatient Plan of Care  Goal: Plan of Care Review  Outcome: Ongoing, Progressing  Goal: Patient-Specific Goal (Individualized)  Outcome: Ongoing, Progressing  Goal: Absence of Hospital-Acquired Illness or Injury  Outcome: Ongoing, Progressing  Goal: Optimal Comfort and Wellbeing  Outcome: Ongoing, Progressing  Goal: Readiness for Transition of Care  Outcome: Ongoing, Progressing     Problem: Skin Injury Risk Increased  Goal: Skin Health and Integrity  Outcome: Ongoing, Progressing     Problem: Pain Chronic (Persistent) (Comorbidity Management)  Goal: Acceptable Pain Control and Functional Ability  Outcome: Ongoing, Progressing     Problem: Fall Injury Risk  Goal: Absence of Fall and Fall-Related Injury  Outcome: Ongoing, Progressing     Problem: Pain Acute  Goal: Acceptable Pain Control and Functional Ability  Outcome: Ongoing, Progressing     Problem: Fluid Volume Deficit  Goal: Fluid Balance  Outcome: Ongoing, Progressing     Problem: Infection  Goal: Absence of Infection Signs and Symptoms  Outcome: Ongoing, Progressing     Problem: Fatigue  Goal: Improved Activity Tolerance  Outcome: Ongoing, Progressing     Problem: Hypertension Comorbidity  Goal: Blood Pressure in Desired Range  Outcome: Ongoing, Progressing     Problem: Electrolyte Imbalance  Goal: Electrolyte Balance  Outcome: Ongoing, Progressing     Problem: Diarrhea  Goal: Fluid and Electrolyte Balance  Outcome: Ongoing, Progressing     Problem: Dysrhythmia  Goal: Normalized Cardiac Rhythm  Outcome: Ongoing, Progressing

## 2023-06-28 NOTE — PROGRESS NOTES
Inpatient Nutrition Assessment    Admit Date: 6/25/2023   Total duration of encounter: 3 days     Nutrition Recommendation/Prescription     If unable to advance to p.o. diet, rec' PPN of Clinimix 4.25/5 @ 90 mL/hr to provide  734 kcal/d (38% est kcal needs)  92 gm amino acids (100% est pro needs)  108 gm dextrose/d (3.9 Glucose Infusion rate)  2160 mL fluid/d  Daily weights  Monitor BMP with Mg+ and Phos daily.  Advance diet when medically feasible. Goal diet: Fiber Restrictive/GI soft.    Communication of Recommendations:  EMR    Nutrition Assessment     Malnutrition Assessment/Nutrition-Focused Physical Exam    Malnutrition Context: acute illness or injury  Malnutrition Level: other (see comments) (Does not meet criteria)  Energy Intake (Malnutrition): less than or equal to 50% for greater than or equal to 5 days  Weight Loss (Malnutrition): other (see comments) (Does not meet criteria)  Subcutaneous Fat (Malnutrition): other (see comments) (Does not meet criteria)  Orbital Region (Subcutaneous Fat Loss): well nourished  Upper Arm Region (Subcutaneous Fat Loss): well nourished  Thoracic and Lumbar Region: well nourished  Muscle Mass (Malnutrition): other (see comments) (Does not meet criteria)  Religious Region (Muscle Loss): well nourished  Clavicle Bone Region (Muscle Loss): well nourished  Clavicle and Acromion Bone Region (Muscle Loss): well nourished  Scapular Bone Region (Muscle Loss): well nourished  Dorsal Hand (Muscle Loss): well nourished  Patellar Region (Muscle Loss): well nourished  Anterior Thigh Region (Muscle Loss): well nourished  Posterior Calf Region (Muscle Loss): well nourished  Fluid Accumulation (Malnutrition):  (Does not meet criteria)  Hand  Strength, Left (Malnutrition): Does not meet criteria  Hand  Strength, Right (Malnutrition): Does not meet criteria  A minimum of two characteristics is recommended for diagnosis of either severe or non-severe malnutrition.    Chart  "Review    Reason Seen: continuous nutrition monitoring    Malnutrition Screening Tool Results   Have you recently lost weight without trying?: No  Have you been eating poorly because of a decreased appetite?: No   MST Score: 0     Diagnosis:  Terminal Ileitis, Possible Ileus related to ileitis, Hx HTN-now hypotension, sepsis,hypokalemia    Relevant Medical History:   HTN, Chemo induced neuropathy, HLD, hx colon cancer (stage III), gout, hx colonic polyps, DJD, CAD, diverticulosis. Surgical hx noted.     Nutrition-Related Medications: Pepcid, Metronidazole, Zosyn, D5 & 0.45% NaCl with Kcl @ 100 mLhr  Calorie Containing IV Medications: no significant kcals from medications at this time    Nutrition-Related Labs:  (6.26) H/H 11L/32.9L Neutrophils 22.0432H K+3.3L BUN 8L Cr 1.33H Ca+ 7.6L   (6.28) WBC 20,350 H (increasing) H/H 11.3L/33.9L BUN 4L Cr 1.25H Glu 152H Ca+ 7.7L Alb 1.9L    Diet/PN Order: Diet NPO  Oral Supplement Order: none  Tube Feeding Order: none  Appetite/Oral Intake: NPO/NPO  Factors Affecting Nutritional Intake: altered gastrointestinal function and NPO  Food/Episcopalian/Cultural Preferences: none reported  Food Allergies: none reported       Wound(s):   Intact.  (6.28) Skin remains intact.    Comments    (6.26) Pt was just discharged from hospital sometime last week with obstruction. Met with pt and family.Pt has poor appetite (consuming <50% for >5 days) and intake PTA on 6.25.23.Pt denied nausea, vomiting, diarrhea or constipation. No chewing or swallowing issues. Pt and family stated #.   (6.28) Colonoscopy was preformed on 6.27.23. Suspected inflammatory bowel disease (Crohn's dx). Post op complicated by suspected aspiration pneumonitis, distended abdomen and difficulty with NG tube placement.       Anthropometrics    Height: 5' 5" (165.1 cm) Height Method: Stated  Last Weight: 77.3 kg (170 lb 6.7 oz) (06/25/23 1115) Weight Method: Bed Scale  BMI (Calculated): 28.4  BMI Classification: " overweight (BMI 25-29.9)        Ideal Body Weight (IBW), Male: 136 lb     % Ideal Body Weight, Male (lb): 125.31 %                 Usual Body Weight (UBW), k.3 kg  % Usual Body Weight: 100.21     Usual Weight Provided By: patient and family/caregiver    Wt Readings from Last 5 Encounters:   23 77.3 kg (170 lb 6.7 oz)   06/15/23 76.9 kg (169 lb 8.5 oz)   23 77.3 kg (170 lb 6.4 oz)   23 77.1 kg (170 lb)   23 78.5 kg (173 lb)     Weight Change(s) Since Admission:  Admit Weight: 77.1 kg (170 lb) (23 0852)  (6.28) No new wt recorded.      Estimated Needs    Weight Used For Calorie Calculations: 77.3 kg (170 lb 6.7 oz)  Energy Calorie Requirements (kcal): 1934 kcal (30 kcal/kg)  Energy Need Method: Kcal/kg  Weight Used For Protein Calculations: 77.3 kg (170 lb 6.7 oz)  Protein Requirements: 92 gm (1.2 gm/kg)  Fluid Requirements (mL): 1933 mL (1 mL/kcal)  Temp (24hrs), Av.6 °F (37 °C), Min:97.8 °F (36.6 °C), Max:99.3 °F (37.4 °C)       Enteral Nutrition    Patient not receiving enteral nutrition at this time.    Parenteral Nutrition    Patient not receiving parenteral nutrition support at this time.    Evaluation of Received Nutrient Intake    Calories: not meeting estimated needs  Protein: not meeting estimated needs    Patient Education    Not applicable.    Nutrition Diagnosis     PES: Altered GI function related to acute illness as evidenced by dx terminal ileitis, possible ileus related to ileitis. (new)    Interventions/Goals     Intervention(s): general/healthful diet and collaboration with other providers  Goal: Consume % of meals/snacks by follow-up. (new)    Monitoring & Evaluation     Dietitian will monitor food and beverage intake, weight change, electrolyte/renal panel, glucose/endocrine profile, and gastrointestinal profile.  Nutrition Risk/Follow-Up: high (follow-up in 1-4 days)   Please consult if re-assessment needed sooner.

## 2023-06-28 NOTE — PROGRESS NOTES
[facility] - Medical Surgical Unit  [facility] Medicine  Progress Note    Patient Name: Supa Carmen  MRN: [id number]  Patient Class: IP- Inpatient   Admission Date: 6/25/2023  Length of Stay: 3 days  Attending Physician: Randy Evans DO  Primary Care Provider: Randy Evans DO        Subjective:     Principal Problem:Terminal ileitis without complication        HPI:  Supa Carmen is a 83 y.o. male who is a pt of   has a past medical history of CAD (coronary artery disease), Chemotherapy-induced neuropathy, Diverticulosis, DJD (degenerative joint disease), Gout, History of colon cancer, stage III (05/20/2011), HLD (hyperlipidemia), HTN (hypertension), and Personal history of colonic polyps (10/24/2017).. The patient presented to [facility] on 6/25/2023 with a primary complaint of right sided abdominal pain that started last night, severe in intensity , no radiation, no aggravating or relieving factors. Last BM this morning . No nausea or vomiting . Ct abd revealed terminal ileitis . Was started on zosyn and flagyl and admitted to floor .       Overview/Hospital Course:  6/26/23: Patient and labs have improved since admission. Tolerating antibiotics. Stool studies still pending. Remains NPO  6/27/23: Colonoscopy performed this AM. Suspected inflammatory bowel disease (Crohn's disease). Biopsy samples sent to pathology. Post-op complicated by suspected aspiration pneumonitis, distended abdomen, and difficulty with NG tube placement. IV solumedrol started after Cardiac workup negative.   6/28/23: Overnight significant for patient's rhythm changing to A-fib for several hours. Treated with Metoprolol IV. Chest X-ray showed developing infiltrates as expected following aspiration event. Supplemental oxygen required overnight. 500cc fluid bolus of NS given to help with hypotension. Spontaneously converted back to sinus rhythm. This morning he is feeling better. Still NPO and on IV  antibiotics. Solumedrol 20mg IV q12 started yesterday.       Interval History: See hospital course    Review of Systems   Constitutional:  Negative for chills and fever.   Respiratory:  Negative for chest tightness, shortness of breath and wheezing.    Cardiovascular:  Negative for chest pain and palpitations.   Gastrointestinal:  Negative for abdominal distention and abdominal pain.   Objective:     Vital Signs (Most Recent):  Temp: 99.5 °F (37.5 °C) (06/28/23 1158)  Pulse: 79 (06/28/23 1158)  Resp: (!) 22 (06/28/23 0807)  BP: 115/60 (06/28/23 1158)  SpO2: (!) 94 % (06/28/23 1158) Vital Signs (24h Range):  Temp:  [97.8 °F (36.6 °C)-99.5 °F (37.5 °C)] 99.5 °F (37.5 °C)  Pulse:  [] 79  Resp:  [20-37] 22  SpO2:  [92 %-97 %] 94 %  BP: ()/(48-79) 115/60     Weight: 77.3 kg (170 lb 6.7 oz)  Body mass index is 28.36 kg/m².    Intake/Output Summary (Last 24 hours) at 6/28/2023 1231  Last data filed at 6/28/2023 1017  Gross per 24 hour   Intake 2275.29 ml   Output 0 ml   Net 2275.29 ml         Physical Exam  Vitals reviewed.   Constitutional:       General: He is not in acute distress.     Appearance: Normal appearance. He is not ill-appearing.   Cardiovascular:      Rate and Rhythm: Normal rate and regular rhythm.      Pulses: Normal pulses.      Heart sounds: Normal heart sounds. No murmur heard.    No friction rub. No gallop.   Pulmonary:      Effort: No respiratory distress.      Breath sounds: No wheezing, rhonchi or rales.   Musculoskeletal:         General: No swelling.      Right lower leg: No edema.      Left lower leg: No edema.   Skin:     General: Skin is warm and dry.   Neurological:      General: No focal deficit present.      Mental Status: He is alert.   Psychiatric:         Mood and Affect: Mood normal.         Behavior: Behavior normal.           Significant Labs: All pertinent labs within the past 24 hours have been reviewed.    Significant Imaging: I have reviewed all pertinent imaging  results/findings within the past 24 hours.      Assessment/Plan:      * Terminal ileitis without complication  Unknown etiology. Continue antibiotics and NPO status. Stool studies pending.     6/27/23: Suspected Crohn's disease. Biopsy results pending. Continue Flagyl and Zosyn. Continue NPO. Will start solumedrol 20mg q12h IV.   6/28/23: Continue NPO status for at least 48 hours. Will start TPN. Will closely monitor cardiac status while on solumedrol.       Aspiration into respiratory tract  During colonoscopy on 6/27/23. Currently on Zosyn and solumedrol. Stable on Room air but did require supplemental oxygen overnight. Will continue to monitor.         Primary hypertension  Home medications held while patient NPO. Will order PRN hydralazine.         VTE Risk Mitigation (From admission, onward)         Ordered     enoxaparin injection 40 mg  Every 12 hours         06/25/23 1120     IP VTE HIGH RISK PATIENT  Once         06/25/23 1120     Place sequential compression device  Until discontinued         06/25/23 1120                Discharge Planning   SRINI:      Code Status: Full Code   Is the patient medically ready for discharge?:     Reason for patient still in hospital (select all that apply): Treatment  Discharge Plan A: Home, Home with family                  Randy Evans DO  Department of Hospital Medicine   [facility] - Medical Surgical Unit

## 2023-06-28 NOTE — ASSESSMENT & PLAN NOTE
Unknown etiology. Continue antibiotics and NPO status. Stool studies pending.     6/27/23: Suspected Crohn's disease. Biopsy results pending. Continue Flagyl and Zosyn. Continue NPO. Will start solumedrol 20mg q12h IV.   6/28/23: Continue NPO status for at least 48 hours. Will start TPN. Will closely monitor cardiac status while on solumedrol.

## 2023-06-28 NOTE — NURSING
Pt noted with diminished lung sounds and increase in O2 requirements. Notified Dr. Evans. New orders noted for STAT CXR.

## 2023-06-28 NOTE — PROGRESS NOTES
[facility]  General Surgery  Progress Note    Subjective:     Interval History:  Patient underwent diagnostic colonoscopy with findings suspicious for Crohn's disease affecting the terminal ileum.  These regions were patent nonobstructed.  Patient was placed on steroids and since yesterday shown considerable improvement of his symptoms.  He states that he had 2 bowel movements since last night and decreasing abdominal discomfort.  Leukocytosis is elevated at this time however likely secondary to steroid dosing currently.  Clinically he does seem to be improved since admission.  Further testing and management pending at this time.  No surgical intervention is required at this time.  Post-Op Info:  Procedure(s) (LRB):  COLONOSCOPY (N/A)   1 Day Post-Op      Medications:  Continuous Infusions:   amino acid 4.25% - dextrose 5% solution       Scheduled Meds:   albuterol-ipratropium  3 mL Nebulization Q6H    enoxparin  40 mg Subcutaneous Q12H    famotidine (PF)  20 mg Intravenous Daily    methylPREDNISolone sodium succinate injection  20 mg Intravenous Q12H    piperacillin-tazobactam (Zosyn) IV (PEDS and ADULTS) (extended infusion is not appropriate)  4.5 g Intravenous Q8H     PRN Meds:albuterol-ipratropium, dextrose 50% in water (D50W), hydrALAZINE, HYDROmorphone, melatonin, metoprolol, ondansetron, sodium chloride 0.9%     Objective:     Vital Signs (Most Recent):  Temp: 99.5 °F (37.5 °C) (06/28/23 1158)  Pulse: 83 (06/28/23 1237)  Resp: 20 (06/28/23 1237)  BP: 115/60 (06/28/23 1158)  SpO2: 99 % (06/28/23 1237) Vital Signs (24h Range):  Temp:  [97.8 °F (36.6 °C)-99.5 °F (37.5 °C)] 99.5 °F (37.5 °C)  Pulse:  [] 83  Resp:  [20-32] 20  SpO2:  [92 %-99 %] 99 %  BP: ()/(48-73) 115/60       Intake/Output Summary (Last 24 hours) at 6/28/2023 1508  Last data filed at 6/28/2023 1351  Gross per 24 hour   Intake 2719.56 ml   Output 0 ml   Net 2719.56 ml       Physical  Exam    Significant Labs:  CBC:   Recent Labs   Lab 06/28/23  0414   WBC 20.35*   RBC 3.99*   HGB 11.3*   HCT 33.9*      MCV 85.0   MCH 28.3   MCHC 33.3     CMP:   Recent Labs   Lab 06/28/23  0414   CALCIUM 7.7*   ALBUMIN 1.9*      K 3.8   CO2 21*   BUN 4.0*   CREATININE 1.26*   ALKPHOS 27*   ALT 6   AST 9   BILITOT 0.5       Significant Diagnostics:  None    Assessment/Plan:     Active Diagnoses:    Diagnosis Date Noted POA    PRINCIPAL PROBLEM:  Terminal ileitis without complication [K50.00] 06/26/2023 Yes    Aspiration into respiratory tract [T17.908A] 06/28/2023 No    Primary hypertension [I10] 02/16/2023 Yes     Chronic      Problems Resolved During this Admission:     GI recommendations currently being executed and patient appears to be responding appropriately this time.  No acute abdominal examination and no acute surgical needs currently.  Would recommend NPO status for bowel rest over the subsequent 72 hours.    Marybeth Mora MD  General Surgery  [facility]

## 2023-06-28 NOTE — NURSING
Notified Dr. Evans that patient converted back to SB/NSR with ectopy. Also notified MD about patient's bradycardic episode with HR down to mid 30's, 7 beat run of VTACH, and CXR results. No new orders noted.

## 2023-06-28 NOTE — SUBJECTIVE & OBJECTIVE
Interval History: See hospital course    Review of Systems   Constitutional:  Negative for chills and fever.   Respiratory:  Negative for chest tightness, shortness of breath and wheezing.    Cardiovascular:  Negative for chest pain and palpitations.   Gastrointestinal:  Negative for abdominal distention and abdominal pain.   Objective:     Vital Signs (Most Recent):  Temp: 99.5 °F (37.5 °C) (06/28/23 1158)  Pulse: 79 (06/28/23 1158)  Resp: (!) 22 (06/28/23 0807)  BP: 115/60 (06/28/23 1158)  SpO2: (!) 94 % (06/28/23 1158) Vital Signs (24h Range):  Temp:  [97.8 °F (36.6 °C)-99.5 °F (37.5 °C)] 99.5 °F (37.5 °C)  Pulse:  [] 79  Resp:  [20-37] 22  SpO2:  [92 %-97 %] 94 %  BP: ()/(48-79) 115/60     Weight: 77.3 kg (170 lb 6.7 oz)  Body mass index is 28.36 kg/m².    Intake/Output Summary (Last 24 hours) at 6/28/2023 1231  Last data filed at 6/28/2023 1017  Gross per 24 hour   Intake 2275.29 ml   Output 0 ml   Net 2275.29 ml         Physical Exam  Vitals reviewed.   Constitutional:       General: He is not in acute distress.     Appearance: Normal appearance. He is not ill-appearing.   Cardiovascular:      Rate and Rhythm: Normal rate and regular rhythm.      Pulses: Normal pulses.      Heart sounds: Normal heart sounds. No murmur heard.    No friction rub. No gallop.   Pulmonary:      Effort: No respiratory distress.      Breath sounds: No wheezing, rhonchi or rales.   Musculoskeletal:         General: No swelling.      Right lower leg: No edema.      Left lower leg: No edema.   Skin:     General: Skin is warm and dry.   Neurological:      General: No focal deficit present.      Mental Status: He is alert.   Psychiatric:         Mood and Affect: Mood normal.         Behavior: Behavior normal.           Significant Labs: All pertinent labs within the past 24 hours have been reviewed.    Significant Imaging: I have reviewed all pertinent imaging results/findings within the past 24 hours.

## 2023-06-28 NOTE — NURSING
Dr. Evans notified of patient converting from NSR to Afib with RVR and hypotension. New orders noted - 1)STAT EKG, 2)500mL NS bolus x 1, 3)Metoprolol 5mg IVP q 4 hours PRN heart rate > 120 sustained. See Lab results tab, MAR, and VS flowsheet. Continuous telemetry and pulse ox monitoring in place.

## 2023-06-28 NOTE — PLAN OF CARE
Problem: Adult Inpatient Plan of Care  Goal: Plan of Care Review  6/28/2023 1111 by Kennedi Valenzuela RN  Outcome: Ongoing, Progressing  6/28/2023 1110 by Kennedi Valenzuela RN  Outcome: Ongoing, Progressing  Goal: Patient-Specific Goal (Individualized)  6/28/2023 1111 by Kennedi Valenzuela RN  Outcome: Ongoing, Progressing  6/28/2023 1110 by Kennedi Valenzuela RN  Outcome: Ongoing, Progressing  Goal: Absence of Hospital-Acquired Illness or Injury  6/28/2023 1111 by Kennedi Valenzuela RN  Outcome: Ongoing, Progressing  6/28/2023 1110 by Kennedi Valenzuela RN  Outcome: Ongoing, Progressing  Goal: Optimal Comfort and Wellbeing  6/28/2023 1111 by Kennedi Valenzuela RN  Outcome: Ongoing, Progressing  6/28/2023 1110 by Kennedi Valenzuela RN  Outcome: Ongoing, Progressing  Goal: Readiness for Transition of Care  6/28/2023 1111 by Kennedi Valenzuela RN  Outcome: Ongoing, Progressing  6/28/2023 1110 by Kennedi Valenzuela RN  Outcome: Ongoing, Progressing     Problem: Skin Injury Risk Increased  Goal: Skin Health and Integrity  6/28/2023 1111 by Kennedi Valenzuela RN  Outcome: Ongoing, Progressing  6/28/2023 1110 by Kenendi Valenzuela RN  Outcome: Ongoing, Progressing     Problem: Pain Chronic (Persistent) (Comorbidity Management)  Goal: Acceptable Pain Control and Functional Ability  6/28/2023 1111 by Kennedi Valenzuela RN  Outcome: Ongoing, Progressing  6/28/2023 1110 by Kennedi Valenzuela RN  Outcome: Ongoing, Progressing     Problem: Fall Injury Risk  Goal: Absence of Fall and Fall-Related Injury  6/28/2023 1111 by Kennedi Valenzuela RN  Outcome: Ongoing, Progressing  6/28/2023 1110 by Kennedi Valenzuela RN  Outcome: Ongoing, Progressing     Problem: Pain Acute  Goal: Acceptable Pain Control and Functional Ability  6/28/2023 1111 by Kennedi Valenzuela RN  Outcome: Ongoing, Progressing  6/28/2023 1110 by Kennedi Valenzuela RN  Outcome: Ongoing, Progressing     Problem: Fluid Volume Deficit  Goal: Fluid Balance  6/28/2023 1111 by Kennedi Valenzuela RN  Outcome: Ongoing,  Progressing  6/28/2023 1110 by Kennedi Valenzuela RN  Outcome: Ongoing, Progressing     Problem: Infection  Goal: Absence of Infection Signs and Symptoms  6/28/2023 1111 by Kennedi Valenzuela RN  Outcome: Ongoing, Progressing  6/28/2023 1110 by Kennedi Valenzuela RN  Outcome: Ongoing, Progressing     Problem: Fatigue  Goal: Improved Activity Tolerance  6/28/2023 1111 by Kennedi Valenzuela RN  Outcome: Ongoing, Progressing  6/28/2023 1110 by Kennedi Valenzuela RN  Outcome: Ongoing, Progressing     Problem: Hypertension Comorbidity  Goal: Blood Pressure in Desired Range  6/28/2023 1111 by Kennedi Valenzuela RN  Outcome: Ongoing, Progressing  6/28/2023 1110 by Kennedi Valenzuela RN  Outcome: Ongoing, Progressing     Problem: Electrolyte Imbalance  Goal: Electrolyte Balance  6/28/2023 1111 by Kennedi Valenzuela RN  Outcome: Ongoing, Progressing  6/28/2023 1110 by Kennedi Valenzuela RN  Outcome: Ongoing, Progressing     Problem: Diarrhea  Goal: Fluid and Electrolyte Balance  6/28/2023 1111 by Kennedi Valenzuela RN  Outcome: Ongoing, Progressing  6/28/2023 1110 by Kennedi Valenzuela RN  Outcome: Ongoing, Progressing     Problem: Dysrhythmia  Goal: Normalized Cardiac Rhythm  6/28/2023 1111 by Kennedi Valenzuela RN  Outcome: Ongoing, Progressing  6/28/2023 1110 by Kennedi Valenzuela RN  Outcome: Ongoing, Progressing

## 2023-06-28 NOTE — ASSESSMENT & PLAN NOTE
During colonoscopy on 6/27/23. Currently on Zosyn and solumedrol. Stable on Room air but did require supplemental oxygen overnight. Will continue to monitor.

## 2023-06-29 PROBLEM — I48.0 PAROXYSMAL ATRIAL FIBRILLATION: Status: ACTIVE | Noted: 2023-01-01

## 2023-06-29 NOTE — ASSESSMENT & PLAN NOTE
During colonoscopy on 6/27/23. Currently on Zosyn and solumedrol. Stable on Room air but did require supplemental oxygen overnight. Will continue to monitor.     6/29/23: Intermittent wheezing. Duonebs ordered. Continue Abx. Will get chest X-ray in AM. Continue to saturate well on room air.

## 2023-06-29 NOTE — CONSULTS
[facility] - Medical Surgical Unit  Cardiology  Consult Note    Patient Name: Supa Carmen  MRN: [id number]  Admission Date: 6/25/2023  Hospital Length of Stay: 4 days  Code Status: Full Code   Attending Provider: Dr Evans  Consulting Provider: Zak Garduno NP  Primary Care Physician: Randy Evans DO  Principal Problem:Terminal ileitis without complication    Patient information was obtained from patient, past medical records, and primary team.     Consults  Subjective:     Chief Complaint:    Tele cardiology consult   Consulting provider:  Dr. Randy Evans  Location:  [facility] inpatient  Reason for consultation:  Atrial fibrillation with RVR   Duration:  Greater than 30 minutes including review of medical records, staff and patient interview    HPI:   83-year-old male with a known history of coronary artery disease, hypertension, dyslipidemia, chemotherapy induced neuropathy, diverticulosis, DJD, clone, colon cancer stage III.  He presented to the hospital June 25th with complaint of abdominal pain he was started on Zosyn and Flagyl.  He had a colonoscopy on the 20/7 which was suspected for Crohn's inflammation.  Postprocedure he had some aspiration and now developed the suspected pneumonia.  His EKG on the 27th showed  atrial fibrillation with a rapid ventricular response he was treated with IV metoprolol.  He did have some hypotension that was treated with IV fluid.  He spontaneously converted back to a normal sinus rhythm.  Since that time he has had some salvos of what appears to be PAF.     Today he again converted to Afib with a controlled rate. There are no acute CAROLYNE or changes. Rate is in the low 100's.    Denies any chest discomfort or SOB    Previously saw Dr Bryan in the past and has not followed with cardiology since he retired  Past Medical History:   Diagnosis Date    CAD (coronary artery disease)     Chemotherapy-induced neuropathy     Diverticulosis     DJD  (degenerative joint disease)     Gout     History of colon cancer, stage III 05/20/2011    HLD (hyperlipidemia)     HTN (hypertension)     Personal history of colonic polyps 10/24/2017    s/p polypectomy - Dr Kenny Vargas       Past Surgical History:   Procedure Laterality Date    CARDIAC CATHETERIZATION  10/14/2009    Dr Tmi Bryan    COLONOSCOPY N/A 6/27/2023    Procedure: COLONOSCOPY;  Surgeon: Kenny Vargas MD;  Location: [facility];  Service: Gastroenterology;  Laterality: N/A;    COLONOSCOPY W/ BIOPSIES AND POLYPECTOMY  05/30/2012    3mm polyp - Dr Kenny Vargas    COLONOSCOPY W/ BIOPSIES AND POLYPECTOMY  10/24/2017    2 polyps - Dr Kenny Vargas    COLONOSCOPY W/ BIOPSIES AND POLYPECTOMY  07/22/2014    2 polyps - Dr Kenny Vargas    HEMICOLECTOMY, RIGHT, LAPAROSCOPIC  05/20/2011    Dr Elijah Huntley    LUMBAR DISCECTOMY      MEDIPORT INSERTION, SINGLE  06/16/2011    Dr Elijah Huntley       Review of patient's allergies indicates:  No Known Allergies    No current facility-administered medications on file prior to encounter.     Current Outpatient Medications on File Prior to Encounter   Medication Sig    atenoloL (TENORMIN) 25 MG tablet TAKE 1 TABLET BY MOUTH EVERY DAY    gabapentin (NEURONTIN) 300 MG capsule Take 1 capsule (300 mg total) by mouth 3 (three) times daily.    losartan (COZAAR) 50 MG tablet TAKE 1 TABLET BY MOUTH EVERY DAY     Family History       Problem Relation (Age of Onset)    No Known Problems Mother    Other Father (42)          Tobacco Use    Smoking status: Former     Types: Cigarettes    Smokeless tobacco: Never   Substance and Sexual Activity    Alcohol use: Yes    Drug use: Never    Sexual activity: Not Currently     Review of Systems   Constitutional: Positive for decreased appetite and malaise/fatigue.   HENT: Negative.     Eyes: Negative.    Cardiovascular:  Negative for chest pain, dyspnea on exertion, irregular heartbeat, near-syncope, orthopnea and  palpitations.   Respiratory:  Positive for cough. Negative for shortness of breath.    Skin: Negative.    Gastrointestinal:  Positive for bloating, abdominal pain and diarrhea.   Neurological: Negative.    Psychiatric/Behavioral:  Positive for altered mental status.    Objective:     Vital Signs (Most Recent):  Temp: 98 °F (36.7 °C) (06/29/23 1152)  Pulse: 106 (06/29/23 1246)  Resp: 20 (06/29/23 1246)  BP: 124/79 (06/29/23 1152)  SpO2: (!) 94 % (06/29/23 1246) Vital Signs (24h Range):  Temp:  [97.4 °F (36.3 °C)-99.3 °F (37.4 °C)] 98 °F (36.7 °C)  Pulse:  [] 106  Resp:  [18-22] 20  SpO2:  [91 %-100 %] 94 %  BP: ()/(56-91) 124/79     Weight: 77.3 kg (170 lb 6.7 oz)  Body mass index is 28.36 kg/m².    SpO2: (!) 94 %         Intake/Output Summary (Last 24 hours) at 6/29/2023 1400  Last data filed at 6/29/2023 0917  Gross per 24 hour   Intake 3283.37 ml   Output --   Net 3283.37 ml       Lines/Drains/Airways       Peripheral Intravenous Line  Duration                  Peripheral IV - Single Lumen 06/25/23 0905 20 G Posterior;Right Forearm 4 days         Peripheral IV - Single Lumen 06/27/23 2000 20 G Left;Posterior Forearm 1 day                    Physical Exam  Constitutional:       Appearance: Normal appearance. He is obese.   HENT:      Head: Normocephalic.      Nose: Nose normal.      Mouth/Throat:      Mouth: Mucous membranes are moist.   Eyes:      Pupils: Pupils are equal, round, and reactive to light.   Cardiovascular:      Rate and Rhythm: Tachycardia present. Rhythm irregular.   Pulmonary:      Effort: Pulmonary effort is normal.   Abdominal:      General: There is distension.   Musculoskeletal:      Cervical back: Normal range of motion.   Skin:     General: Skin is warm and dry.   Neurological:      General: No focal deficit present.      Mental Status: He is alert.   Psychiatric:         Mood and Affect: Mood normal.       Significant Labs:   Recent Lab Results         06/29/23  5478        Anion  Gap 7.0       Baso # 0.03       Basophil % 0.2       BUN 10.0  Comment: Daily BMP, MG, Phos while on Clinimix       BUN/CREAT RATIO 9       Calcium 8.0  Comment: Daily BMP, MG, Phos while on Clinimix       Chloride 108  Comment: Daily BMP, MG, Phos while on Clinimix       CO2 21  Comment: Daily BMP, MG, Phos while on Clinimix       Creatinine 1.08  Comment: Daily BMP, MG, Phos while on Clinimix       eGFR >60       Eos # 0.00       Eosinophil % 0.0       Glucose 123  Comment: Daily BMP, MG, Phos while on Clinimix       Hematocrit 31.3       Hemoglobin 10.4       Immature Grans (Abs) 0.46       Immature Granulocytes 2.4       Lymph # 0.44       LYMPH % 2.3       Magnesium 1.80  Comment: Daily BMP, MG, Phos while on Clinimix       MCH 28.2       MCHC 33.2       MCV 84.8       Mono # 0.89       Mono % 4.6       MPV 9.9       Neut # 17.35       Neut % 90.5       nRBC 0.0       Phosphorus 1.8       Platelets 249       Potassium 3.1  Comment: Daily BMP, MG, Phos while on Clinimix       RBC 3.69       RDW 14.7       Sodium 136  Comment: Daily BMP, MG, Phos while on Clinimix       WBC 19.17               Significant Imaging:   EKG:          CXR: LLL PNA  Assessment and Plan:     Impression:  Afib RVR  - Probable PAF   Hypokalemia  - replacement in progress  CAD ? He is not sure   Crohn's flare up  Aspiration PNA  Colon Cancer stage III  HTN  - losartan and atenolol at home, currently on hold    Plan:  Today he converted back into atrial fibrillation but at a more controlled rate.  Could be secondary to his acute medical condition ( crohn's, PNA< electrolyte imbalance ) or more of a chronic issue or both  There are no acute changes on his EKG.  Agree with potassium replacement for K+ > 4.0  Magnesium 1 g the Keep level greater than 2.0  Metoprolol IV q.6 hours, transition to Metoprolol 25 bid when taking po   Echocardiogram to evaluate LV function and chamber sizes  Cycle cardiac biomarkers   He has a high chads score would  recommend Eliquis 5 mg 1 p.o. b.i.d. when able to tolerate po  In the meantime Lovenox 1 mg kg sq bid if ok with primary team     He will definitely need outpatient follow-up with [facility]  Will get him scheduled     Thank you for the consultation should you have any further questions or concerns please do not hesitate to call    Active Diagnoses:    Diagnosis Date Noted POA    PRINCIPAL PROBLEM:  Terminal ileitis without complication [K50.00] 06/26/2023 Yes    Aspiration into respiratory tract [T17.908A] 06/28/2023 No    Primary hypertension [I10] 02/16/2023 Yes     Chronic      Problems Resolved During this Admission:       VTE Risk Mitigation (From admission, onward)           Ordered     enoxaparin injection 40 mg  Every 12 hours         06/25/23 1120     IP VTE HIGH RISK PATIENT  Once         06/25/23 1120     Place sequential compression device  Until discontinued         06/25/23 1120                    Thank you for your consult.     Zak Garduno NP  Cardiology   [facility] - [facility] Surgical Unit

## 2023-06-29 NOTE — PROGRESS NOTES
[facility] - Medical Surgical Unit  [facility] Medicine  Progress Note    Patient Name: Supa Carmen  MRN: [id number]  Patient Class: IP- Inpatient   Admission Date: 6/25/2023  Length of Stay: 4 days  Attending Physician: Randy Evans DO  Primary Care Provider: Randy Evans DO        Subjective:     Principal Problem:Terminal ileitis without complication        HPI:  Supa Carmen is a 83 y.o. male who is a pt of   has a past medical history of CAD (coronary artery disease), Chemotherapy-induced neuropathy, Diverticulosis, DJD (degenerative joint disease), Gout, History of colon cancer, stage III (05/20/2011), HLD (hyperlipidemia), HTN (hypertension), and Personal history of colonic polyps (10/24/2017).. The patient presented to [facility] on 6/25/2023 with a primary complaint of right sided abdominal pain that started last night, severe in intensity , no radiation, no aggravating or relieving factors. Last BM this morning . No nausea or vomiting . Ct abd revealed terminal ileitis . Was started on zosyn and flagyl and admitted to floor .       Overview/Hospital Course:  6/26/23: Patient and labs have improved since admission. Tolerating antibiotics. Stool studies still pending. Remains NPO  6/27/23: Colonoscopy performed this AM. Suspected inflammatory bowel disease (Crohn's disease). Biopsy samples sent to pathology. Post-op complicated by suspected aspiration pneumonitis, distended abdomen, and difficulty with NG tube placement. IV solumedrol started after Cardiac workup negative.   6/28/23: Overnight significant for patient's rhythm changing to A-fib for several hours. Treated with Metoprolol IV. Chest X-ray showed developing infiltrates as expected following aspiration event. Supplemental oxygen required overnight. 500cc fluid bolus of NS given to help with hypotension. Spontaneously converted back to sinus rhythm. This morning he is feeling better. Still NPO and on IV  antibiotics. Solumedrol 20mg IV q12 started yesterday.   6/29/23: Patient has gone back into A-fib. Overall doing better. Continue NPO. Tolerating TPN. Potassium low again at 3.1, will replace and switch to TPN with potassium.           Review of Systems   Constitutional:  Negative for fever.   Respiratory:  Negative for chest tightness, shortness of breath and wheezing.    Cardiovascular:  Negative for chest pain.   Gastrointestinal:  Negative for abdominal distention and abdominal pain.   Objective:     Vital Signs (Most Recent):  Temp: 97.6 °F (36.4 °C) (06/29/23 1608)  Pulse: 101 (06/29/23 1740)  Resp: 20 (06/29/23 1740)  BP: (!) 157/98 (06/29/23 1608)  SpO2: 96 % (06/29/23 1740) Vital Signs (24h Range):  Temp:  [97.4 °F (36.3 °C)-99.3 °F (37.4 °C)] 97.6 °F (36.4 °C)  Pulse:  [] 101  Resp:  [18-22] 20  SpO2:  [91 %-100 %] 96 %  BP: ()/(63-98) 157/98     Weight: 77.3 kg (170 lb 6.7 oz)  Body mass index is 28.36 kg/m².    Intake/Output Summary (Last 24 hours) at 6/29/2023 1744  Last data filed at 6/29/2023 0917  Gross per 24 hour   Intake 3283.37 ml   Output --   Net 3283.37 ml         Physical Exam  Vitals reviewed.   Constitutional:       General: He is not in acute distress.     Appearance: Normal appearance. He is not ill-appearing.   Cardiovascular:      Rate and Rhythm: Tachycardia present. Rhythm irregular.      Pulses: Normal pulses.      Heart sounds: Normal heart sounds. No murmur heard.    No friction rub. No gallop.   Pulmonary:      Effort: No respiratory distress.      Breath sounds: Wheezing present. No rhonchi or rales.   Abdominal:      General: Bowel sounds are decreased. There is distension.      Tenderness: There is no abdominal tenderness.   Musculoskeletal:         General: No swelling.      Right lower leg: No edema.      Left lower leg: No edema.   Skin:     General: Skin is warm and dry.   Neurological:      General: No focal deficit present.      Mental Status: He is alert.    Psychiatric:         Mood and Affect: Mood normal.         Behavior: Behavior normal.           Significant Labs: All pertinent labs within the past 24 hours have been reviewed.    Significant Imaging: I have reviewed all pertinent imaging results/findings within the past 24 hours.      Assessment/Plan:      * Terminal ileitis without complication  Unknown etiology. Continue antibiotics and NPO status. Stool studies pending.     6/27/23: Suspected Crohn's disease. Biopsy results pending. Continue Flagyl and Zosyn. Continue NPO. Will start solumedrol 20mg q12h IV.   6/28/23: Continue NPO status for at least 48 hours. Will start TPN. Will closely monitor cardiac status while on solumedrol.       Paroxysmal atrial fibrillation  Initially entered A-fib on 6/27/23 but spontaneously converted back to sinus rhythm overnight. Back in A-fib on 6/29/23. On IV metoprolol PRN.     Will consult tele-cardiology for assistance in management and evaluation. Appreciate their assistance.     Aspiration into respiratory tract  During colonoscopy on 6/27/23. Currently on Zosyn and solumedrol. Stable on Room air but did require supplemental oxygen overnight. Will continue to monitor.     6/29/23: Intermittent wheezing. Duonebs ordered. Continue Abx. Will get chest X-ray in AM. Continue to saturate well on room air.         Primary hypertension  Home medications held while patient NPO. Will order PRN hydralazine.         VTE Risk Mitigation (From admission, onward)         Ordered     IP VTE HIGH RISK PATIENT  Once         06/25/23 1120     Place sequential compression device  Until discontinued         06/25/23 1120                Discharge Planning   SRINI:      Code Status: Full Code   Is the patient medically ready for discharge?:     Reason for patient still in hospital (select all that apply): Treatment  Discharge Plan A: Home with family, Home Health, Home   Discharge Delays: None known at this time              Randy Evans    Department of Hospital Medicine   [facility] - [facility]

## 2023-06-29 NOTE — PLAN OF CARE
Problem: Adult Inpatient Plan of Care  Goal: Plan of Care Review  6/29/2023 1022 by Kennedi Valenzuela RN  Outcome: Ongoing, Progressing  6/29/2023 1021 by Kennedi Valenzueal RN  Outcome: Ongoing, Progressing  Goal: Patient-Specific Goal (Individualized)  6/29/2023 1022 by Kennedi Valenzuela RN  Outcome: Ongoing, Progressing  6/29/2023 1021 by Kennedi Valenzuela RN  Outcome: Ongoing, Progressing  Goal: Absence of Hospital-Acquired Illness or Injury  6/29/2023 1022 by Kennedi Valenzuela RN  Outcome: Ongoing, Progressing  6/29/2023 1021 by Kennedi Valenzuela RN  Outcome: Ongoing, Progressing  Goal: Optimal Comfort and Wellbeing  6/29/2023 1022 by Kennedi Valenzuela RN  Outcome: Ongoing, Progressing  6/29/2023 1021 by Kennedi Valenzuela RN  Outcome: Ongoing, Progressing  Goal: Readiness for Transition of Care  6/29/2023 1022 by Kennedi Valenzuela RN  Outcome: Ongoing, Progressing  6/29/2023 1021 by Kennedi Valenzuela RN  Outcome: Ongoing, Progressing     Problem: Skin Injury Risk Increased  Goal: Skin Health and Integrity  6/29/2023 1022 by Kennedi Valenzuela RN  Outcome: Ongoing, Progressing  6/29/2023 1021 by Kennedi Valenzuela RN  Outcome: Ongoing, Progressing     Problem: Pain Chronic (Persistent) (Comorbidity Management)  Goal: Acceptable Pain Control and Functional Ability  6/29/2023 1022 by Kennedi Valenzuela RN  Outcome: Ongoing, Progressing  6/29/2023 1021 by Kennedi Valenzuela RN  Outcome: Ongoing, Progressing     Problem: Fall Injury Risk  Goal: Absence of Fall and Fall-Related Injury  6/29/2023 1022 by Kennedi Valenzuela RN  Outcome: Ongoing, Progressing  6/29/2023 1021 by Kennedi Valenzuela RN  Outcome: Ongoing, Progressing     Problem: Pain Acute  Goal: Acceptable Pain Control and Functional Ability  6/29/2023 1022 by Kennedi Valenzuela RN  Outcome: Ongoing, Progressing  6/29/2023 1021 by Kennedi Valenzuela RN  Outcome: Ongoing, Progressing     Problem: Fluid Volume Deficit  Goal: Fluid Balance  6/29/2023 1022 by Kennedi Valenzuela RN  Outcome: Ongoing,  Progressing  6/29/2023 1021 by Kennedi Valenzuela RN  Outcome: Ongoing, Progressing     Problem: Infection  Goal: Absence of Infection Signs and Symptoms  6/29/2023 1022 by Kennedi Valenzuela RN  Outcome: Ongoing, Progressing  6/29/2023 1021 by Kennedi Valenzuela RN  Outcome: Ongoing, Progressing     Problem: Fatigue  Goal: Improved Activity Tolerance  6/29/2023 1022 by Kennedi Valenzuela RN  Outcome: Ongoing, Progressing  6/29/2023 1021 by Kennedi Valenzuela RN  Outcome: Ongoing, Progressing     Problem: Hypertension Comorbidity  Goal: Blood Pressure in Desired Range  6/29/2023 1022 by Kennedi Valenzuela RN  Outcome: Ongoing, Progressing  6/29/2023 1021 by Kennedi Valenzuela RN  Outcome: Ongoing, Progressing     Problem: Electrolyte Imbalance  Goal: Electrolyte Balance  6/29/2023 1022 by Kennedi Valenzuela RN  Outcome: Ongoing, Progressing  6/29/2023 1021 by Kennedi Valenzuela RN  Outcome: Ongoing, Progressing     Problem: Diarrhea  Goal: Fluid and Electrolyte Balance  6/29/2023 1022 by Kennedi Valenzuela RN  Outcome: Ongoing, Progressing  6/29/2023 1021 by Kennedi Valenzuela RN  Outcome: Ongoing, Progressing     Problem: Dysrhythmia  Goal: Normalized Cardiac Rhythm  6/29/2023 1022 by Kennedi Valenzuela RN  Outcome: Ongoing, Progressing  6/29/2023 1021 by Kennedi Valenzuela RN  Outcome: Ongoing, Progressing     Problem: Parenteral Nutrition  Goal: Effective Intravenous Nutrition Therapy Delivery  Outcome: Ongoing, Progressing

## 2023-06-29 NOTE — PLAN OF CARE
Problem: Adult Inpatient Plan of Care  Goal: Plan of Care Review  Outcome: Ongoing, Progressing  Goal: Patient-Specific Goal (Individualized)  Outcome: Ongoing, Progressing  Goal: Absence of Hospital-Acquired Illness or Injury  Outcome: Ongoing, Progressing  Goal: Optimal Comfort and Wellbeing  Outcome: Ongoing, Progressing  Goal: Readiness for Transition of Care  Outcome: Ongoing, Progressing     Problem: Skin Injury Risk Increased  Goal: Skin Health and Integrity  Outcome: Ongoing, Progressing     Problem: Pain Chronic (Persistent) (Comorbidity Management)  Goal: Acceptable Pain Control and Functional Ability  Outcome: Ongoing, Progressing     Problem: Hypertension Comorbidity  Goal: Blood Pressure in Desired Range  Outcome: Ongoing, Progressing     Problem: Fall Injury Risk  Goal: Absence of Fall and Fall-Related Injury  Outcome: Ongoing, Progressing     Problem: Pain Acute  Goal: Acceptable Pain Control and Functional Ability  Outcome: Ongoing, Progressing     Problem: Fluid Volume Deficit  Goal: Fluid Balance  Outcome: Ongoing, Progressing     Problem: Infection  Goal: Absence of Infection Signs and Symptoms  Outcome: Ongoing, Progressing     Problem: Fatigue  Goal: Improved Activity Tolerance  Outcome: Ongoing, Progressing     Problem: Electrolyte Imbalance  Goal: Electrolyte Balance  Outcome: Ongoing, Progressing     Problem: Diarrhea  Goal: Fluid and Electrolyte Balance  Outcome: Ongoing, Progressing     Problem: Dysrhythmia  Goal: Normalized Cardiac Rhythm  Outcome: Ongoing, Progressing

## 2023-06-29 NOTE — PLAN OF CARE
Problem: Adult Inpatient Plan of Care  Goal: Plan of Care Review  Outcome: Ongoing, Progressing  Goal: Patient-Specific Goal (Individualized)  Outcome: Ongoing, Progressing  Goal: Absence of Hospital-Acquired Illness or Injury  Outcome: Ongoing, Progressing  Goal: Optimal Comfort and Wellbeing  Outcome: Ongoing, Progressing  Goal: Readiness for Transition of Care  Outcome: Ongoing, Progressing     Problem: Skin Injury Risk Increased  Goal: Skin Health and Integrity  Outcome: Ongoing, Progressing     Problem: Pain Chronic (Persistent) (Comorbidity Management)  Goal: Acceptable Pain Control and Functional Ability  Outcome: Ongoing, Progressing     Problem: Fall Injury Risk  Goal: Absence of Fall and Fall-Related Injury  Outcome: Ongoing, Progressing     Problem: Pain Acute  Goal: Acceptable Pain Control and Functional Ability  Outcome: Ongoing, Progressing     Problem: Fluid Volume Deficit  Goal: Fluid Balance  Outcome: Ongoing, Progressing     Problem: Infection  Goal: Absence of Infection Signs and Symptoms  Outcome: Ongoing, Progressing     Problem: Fatigue  Goal: Improved Activity Tolerance  Outcome: Ongoing, Progressing     Problem: Hypertension Comorbidity  Goal: Blood Pressure in Desired Range  Outcome: Ongoing, Progressing     Problem: Electrolyte Imbalance  Goal: Electrolyte Balance  Outcome: Ongoing, Progressing     Problem: Diarrhea  Goal: Fluid and Electrolyte Balance  Outcome: Ongoing, Progressing     Problem: Dysrhythmia  Goal: Normalized Cardiac Rhythm  Outcome: Ongoing, Progressing      Drug including DOSE: Synagis 15mg/kg q28-30 days  J Code: 35930  NDC: [address]-51  ICD 10 code: p07.30     Date(s) of Service: ASAP-March 2022        Insurance Name: BCBS out of state?  Insurance ID: [id number]  Group:  [id number]       Ordering Physician: Mame Castellon CNP  NPI: [id number]      [facility]  NPI: [id number]    *got first dose in hospital on 10/26/21

## 2023-06-29 NOTE — PROGRESS NOTES
06/29/23 0644   Discharge Reassessment   Assessment Type Discharge Planning Reassessment   Did the patient's condition or plan change since previous assessment? No   Discharge Plan discussed with: Spouse/sig other;Patient   Name(s) and Number(s) Arcelia Carmen [phone]   Discharge Plan A Home with family;Home Health;Home   DME Needed Upon Discharge  none   Post-Acute Status   Coverage [employer]   Discharge Delays None known at this time

## 2023-06-29 NOTE — SUBJECTIVE & OBJECTIVE
Review of Systems   Constitutional:  Negative for fever.   Respiratory:  Negative for chest tightness, shortness of breath and wheezing.    Cardiovascular:  Negative for chest pain.   Gastrointestinal:  Negative for abdominal distention and abdominal pain.   Objective:     Vital Signs (Most Recent):  Temp: 97.6 °F (36.4 °C) (06/29/23 1608)  Pulse: 101 (06/29/23 1740)  Resp: 20 (06/29/23 1740)  BP: (!) 157/98 (06/29/23 1608)  SpO2: 96 % (06/29/23 1740) Vital Signs (24h Range):  Temp:  [97.4 °F (36.3 °C)-99.3 °F (37.4 °C)] 97.6 °F (36.4 °C)  Pulse:  [] 101  Resp:  [18-22] 20  SpO2:  [91 %-100 %] 96 %  BP: ()/(63-98) 157/98     Weight: 77.3 kg (170 lb 6.7 oz)  Body mass index is 28.36 kg/m².    Intake/Output Summary (Last 24 hours) at 6/29/2023 1744  Last data filed at 6/29/2023 0917  Gross per 24 hour   Intake 3283.37 ml   Output --   Net 3283.37 ml         Physical Exam  Vitals reviewed.   Constitutional:       General: He is not in acute distress.     Appearance: Normal appearance. He is not ill-appearing.   Cardiovascular:      Rate and Rhythm: Tachycardia present. Rhythm irregular.      Pulses: Normal pulses.      Heart sounds: Normal heart sounds. No murmur heard.    No friction rub. No gallop.   Pulmonary:      Effort: No respiratory distress.      Breath sounds: Wheezing present. No rhonchi or rales.   Abdominal:      General: Bowel sounds are decreased. There is distension.      Tenderness: There is no abdominal tenderness.   Musculoskeletal:         General: No swelling.      Right lower leg: No edema.      Left lower leg: No edema.   Skin:     General: Skin is warm and dry.   Neurological:      General: No focal deficit present.      Mental Status: He is alert.   Psychiatric:         Mood and Affect: Mood normal.         Behavior: Behavior normal.           Significant Labs: All pertinent labs within the past 24 hours have been reviewed.    Significant Imaging: I have reviewed all pertinent  imaging results/findings within the past 24 hours.

## 2023-06-30 NOTE — PROGRESS NOTES
Inpatient Nutrition Assessment    Admit Date: 6/25/2023   Total duration of encounter: 5 days     Nutrition Recommendation/Prescription     Continue PPN of Clinimix 4.25/5 @ 90 mL/hr which provides  734 kcal/d (38% est kcal needs)  92 gm amino acids (100% est pro needs)  108 gm dextrose/d (3.9 Glucose Infusion rate)  2160 mL fluid/d  Daily weights  Monitor BMP with Mg+ and Phos daily.  Advance diet when medically feasible and as tolerated. Goal diet: Fiber Restrictive/GI soft.    Communication of Recommendations:  EMR    Nutrition Assessment     Malnutrition Assessment/Nutrition-Focused Physical Exam    Malnutrition Context: acute illness or injury  Malnutrition Level: other (see comments) (Does not meet criteria)  Energy Intake (Malnutrition): less than or equal to 50% for greater than or equal to 5 days  Weight Loss (Malnutrition): other (see comments) (Does not meet criteria)  Subcutaneous Fat (Malnutrition): other (see comments) (Does not meet criteria)  Orbital Region (Subcutaneous Fat Loss): well nourished  Upper Arm Region (Subcutaneous Fat Loss): well nourished  Thoracic and Lumbar Region: well nourished  Muscle Mass (Malnutrition): other (see comments) (Does not meet criteria)  Judaism Region (Muscle Loss): well nourished  Clavicle Bone Region (Muscle Loss): well nourished  Clavicle and Acromion Bone Region (Muscle Loss): well nourished  Scapular Bone Region (Muscle Loss): well nourished  Dorsal Hand (Muscle Loss): well nourished  Patellar Region (Muscle Loss): well nourished  Anterior Thigh Region (Muscle Loss): well nourished  Posterior Calf Region (Muscle Loss): well nourished  Fluid Accumulation (Malnutrition):  (Does not meet criteria)  Hand  Strength, Left (Malnutrition): Does not meet criteria  Hand  Strength, Right (Malnutrition): Does not meet criteria  A minimum of two characteristics is recommended for diagnosis of either severe or non-severe malnutrition.    Chart Review    Reason Seen:  continuous nutrition monitoring    Malnutrition Screening Tool Results   Have you recently lost weight without trying?: No  Have you been eating poorly because of a decreased appetite?: No   MST Score: 0     Diagnosis:  Terminal Ileitis, Possible Ileus related to ileitis, Hx HTN-now hypotension, sepsis,hypokalemia    Relevant Medical History:   HTN, Chemo induced neuropathy, HLD, hx colon cancer (stage III), gout, hx colonic polyps, DJD, CAD, diverticulosis. Surgical hx noted.     Nutrition-Related Medications: Pepcid, Metronidazole, Zosyn, D5 & 0.45% NaCl with Kcl @ 100 mLhr  Calorie Containing IV Medications: Clinimix    Nutrition-Related Labs:  (6.26) H/H 11L/32.9L Neutrophils 22.0432H K+3.3L BUN 8L Cr 1.33H Ca+ 7.6L   (6.28) WBC 20,350 H (increasing) H/H 11.3L/33.9L BUN 4L Cr 1.25H Glu 152H Ca+ 7.7L Alb 1.9L  (6.30) WBC 14,730H H/H 11.2L/33.4L Glu 119H Ca+ 8.1L Alb 1.9L Alk Phos 27L     Diet/PN Order: Amino acid 4.25% - dextrose 5% (CLINIMIX-E) solution (1L provides 42.5 gm AA, 50 gm CHO (170 kcal/L dextrose), Na 35, K 30, Mg 5, Ca 4.5, Acetate 70, Cl 39, Phos 15)  Diet clear liquid  Oral Supplement Order: none  Tube Feeding Order: none  Appetite/Oral Intake: NPO/NPO  Factors Affecting Nutritional Intake: altered gastrointestinal function and NPO  Food/Synagogue/Cultural Preferences: none reported  Food Allergies: none reported       Wound(s):   Intact.  (6.28) Skin remains intact.    Comments    (6.26) Pt was just discharged from hospital sometime last week with obstruction. Met with pt and family.Pt has poor appetite (consuming <50% for >5 days) and intake PTA on 6.25.23.Pt denied nausea, vomiting, diarrhea or constipation. No chewing or swallowing issues. Pt and family stated #.   (6.28) Colonoscopy was preformed on 6.27.23. Suspected inflammatory bowel disease (Crohn's dx). Post op complicated by suspected aspiration pneumonitis, distended abdomen and difficulty with NG tube placement.   (6.30) Pt's  "diet upgraded today to Clear Liquids.  Continues to tolerate PPN.     Anthropometrics    Height: 5' 5" (165.1 cm) Height Method: Stated  Last Weight: 77.3 kg (170 lb 6.7 oz) (23 1115) Weight Method: Bed Scale  BMI (Calculated): 28.4  BMI Classification: overweight (BMI 25-29.9)          Ideal Body Weight (IBW), Male: 136 lb     % Ideal Body Weight, Male (lb): 125.31 %                 Usual Body Weight (UBW), k.3 kg  % Usual Body Weight: 100.21     Usual Weight Provided By: patient and family/caregiver    Wt Readings from Last 5 Encounters:   23 77.3 kg (170 lb 6.7 oz)   06/15/23 76.9 kg (169 lb 8.5 oz)   23 77.3 kg (170 lb 6.4 oz)   23 77.1 kg (170 lb)   23 78.5 kg (173 lb)     Weight Change(s) Since Admission:  Admit Weight: 77.1 kg (170 lb) (23 0852)  (6.28) No new wt recorded.  (6.30) No new wt recorded.      Estimated Needs    Weight Used For Calorie Calculations: 77.3 kg (170 lb 6.7 oz)  Energy Calorie Requirements (kcal): 1934 kcal (30 kcal/kg)  Energy Need Method: Kcal/kg  Weight Used For Protein Calculations: 77.3 kg (170 lb 6.7 oz)  Protein Requirements: 92 gm (1.2 gm/kg)  Fluid Requirements (mL): 1933 mL (1 mL/kcal)  Temp (24hrs), Av.2 °F (36.8 °C), Min:97.6 °F (36.4 °C), Max:98.8 °F (37.1 °C)       Enteral Nutrition    Patient not receiving enteral nutrition at this time.    Parenteral Nutrition    Standard Formula: Clinimix E 4.25/5  Custom Formula:     Additives: none  Rate/Volume: 90  Lipids: none  Total Nutrition Provided by Parenteral Nutrition:  Calories Provided  734 kcal/d, 38% needs   Protein Provided  92 g/d, 100% needs   Dextrose Provided  108 g/d, GIR 3.9 mg CHO/kg/min   Fluid Provided  2160 ml/d, 112% needs       Evaluation of Received Nutrient Intake    Calories: not meeting estimated needs  Protein: not meeting estimated needs    Patient Education    Not applicable.    Nutrition Diagnosis     PES: Altered GI function related to acute illness as " evidenced by dx terminal ileitis, possible ileus related to ileitis. (continues)    Interventions/Goals     Intervention(s): general/healthful diet and collaboration with other providers  Goal: Consume % of meals/snacks by follow-up. (goal progressing)    Monitoring & Evaluation     Dietitian will monitor food and beverage intake, weight change, electrolyte/renal panel, glucose/endocrine profile, and gastrointestinal profile.  Nutrition Risk/Follow-Up: high (follow-up in 1-4 days)   Please consult if re-assessment needed sooner.

## 2023-06-30 NOTE — SUBJECTIVE & OBJECTIVE
Review of Systems   Constitutional:  Negative for fatigue and fever.   Respiratory:  Negative for chest tightness, shortness of breath and wheezing.    Cardiovascular:  Negative for chest pain and palpitations.   Gastrointestinal:  Negative for abdominal distention and abdominal pain.   Neurological:  Negative for dizziness and headaches.   Objective:     Vital Signs (Most Recent):  Temp: 98.8 °F (37.1 °C) (06/30/23 1158)  Pulse: (!) 132 (06/30/23 1158)  Resp: 18 (06/30/23 0555)  BP: (!) 172/95 (06/30/23 1158)  SpO2: 97 % (06/30/23 1158) Vital Signs (24h Range):  Temp:  [97.6 °F (36.4 °C)-98.8 °F (37.1 °C)] 98.8 °F (37.1 °C)  Pulse:  [] 132  Resp:  [18-22] 18  SpO2:  [94 %-98 %] 97 %  BP: (138-172)/(69-98) 172/95     Weight: 77.3 kg (170 lb 6.7 oz)  Body mass index is 28.36 kg/m².    Intake/Output Summary (Last 24 hours) at 6/30/2023 1238  Last data filed at 6/30/2023 0541  Gross per 24 hour   Intake 379.09 ml   Output --   Net 379.09 ml         Physical Exam  Vitals reviewed.   Constitutional:       General: He is not in acute distress.     Appearance: Normal appearance. He is not ill-appearing.   Cardiovascular:      Rate and Rhythm: Normal rate and regular rhythm.      Pulses: Normal pulses.      Heart sounds: Normal heart sounds. No murmur heard.    No friction rub. No gallop.   Pulmonary:      Effort: No respiratory distress.      Breath sounds: No wheezing, rhonchi or rales.   Abdominal:      General: Bowel sounds are decreased. There is no distension.   Musculoskeletal:         General: No swelling.      Right lower leg: No edema.      Left lower leg: No edema.   Skin:     General: Skin is warm and dry.   Neurological:      General: No focal deficit present.      Mental Status: He is alert.   Psychiatric:         Mood and Affect: Mood normal.         Behavior: Behavior normal.           Significant Labs: All pertinent labs within the past 24 hours have been reviewed.    Significant Imaging: I have  reviewed all pertinent imaging results/findings within the past 24 hours.

## 2023-06-30 NOTE — PLAN OF CARE
Problem: Adult Inpatient Plan of Care  Goal: Plan of Care Review  6/29/2023 1927 by Kennedi Valenzuela RN  Outcome: Ongoing, Progressing  6/29/2023 1022 by Kennedi Valenzuela RN  Outcome: Ongoing, Progressing  6/29/2023 1021 by Kennedi Valenzuela RN  Outcome: Ongoing, Progressing  Goal: Patient-Specific Goal (Individualized)  6/29/2023 1927 by Kennedi Valenzuela RN  Outcome: Ongoing, Progressing  6/29/2023 1022 by Kennedi Valenzuela RN  Outcome: Ongoing, Progressing  6/29/2023 1021 by Kennedi Valenzuela RN  Outcome: Ongoing, Progressing  Goal: Absence of Hospital-Acquired Illness or Injury  6/29/2023 1927 by Kennedi Valenzuela RN  Outcome: Ongoing, Progressing  6/29/2023 1022 by Kennedi Valenzuela RN  Outcome: Ongoing, Progressing  6/29/2023 1021 by Kennedi Valenzuela RN  Outcome: Ongoing, Progressing  Goal: Optimal Comfort and Wellbeing  6/29/2023 1927 by Kennedi Valenzuela RN  Outcome: Ongoing, Progressing  6/29/2023 1022 by Kennedi Valenzuela RN  Outcome: Ongoing, Progressing  6/29/2023 1021 by Kennedi Valenzuela RN  Outcome: Ongoing, Progressing  Goal: Readiness for Transition of Care  6/29/2023 1927 by Kennedi Valenzuela RN  Outcome: Ongoing, Progressing  6/29/2023 1022 by Kennedi Valenzuela RN  Outcome: Ongoing, Progressing  6/29/2023 1021 by Kennedi Valenzuela RN  Outcome: Ongoing, Progressing     Problem: Skin Injury Risk Increased  Goal: Skin Health and Integrity  6/29/2023 1927 by Kennedi Valenzuela RN  Outcome: Ongoing, Progressing  6/29/2023 1022 by Kennedi Valenzuela RN  Outcome: Ongoing, Progressing  6/29/2023 1021 by Kennedi Valenzuela RN  Outcome: Ongoing, Progressing     Problem: Pain Chronic (Persistent) (Comorbidity Management)  Goal: Acceptable Pain Control and Functional Ability  6/29/2023 1927 by Kennedi Valenzuela RN  Outcome: Ongoing, Progressing  6/29/2023 1022 by Kennedi Valenzuela RN  Outcome: Ongoing, Progressing  6/29/2023 1021 by Kennedi Valenzuela RN  Outcome: Ongoing, Progressing     Problem: Fall Injury Risk  Goal: Absence of Fall and  Fall-Related Injury  6/29/2023 1927 by Kennedi Valenzuela RN  Outcome: Ongoing, Progressing  6/29/2023 1022 by Kennedi Valenzuela RN  Outcome: Ongoing, Progressing  6/29/2023 1021 by Kennedi Valenzuela RN  Outcome: Ongoing, Progressing     Problem: Pain Acute  Goal: Acceptable Pain Control and Functional Ability  6/29/2023 1927 by Kennedi Valenzuela RN  Outcome: Ongoing, Progressing  6/29/2023 1022 by Kennedi Valenzuela RN  Outcome: Ongoing, Progressing  6/29/2023 1021 by Kennedi Valenzuela RN  Outcome: Ongoing, Progressing     Problem: Fluid Volume Deficit  Goal: Fluid Balance  6/29/2023 1927 by Kennedi Valenzuela RN  Outcome: Ongoing, Progressing  6/29/2023 1022 by Kennedi Valenzuela RN  Outcome: Ongoing, Progressing  6/29/2023 1021 by Kennedi Valenzuela RN  Outcome: Ongoing, Progressing     Problem: Infection  Goal: Absence of Infection Signs and Symptoms  6/29/2023 1927 by Kennedi Valenzuela RN  Outcome: Ongoing, Progressing  6/29/2023 1022 by Kennedi Valenzuela RN  Outcome: Ongoing, Progressing  6/29/2023 1021 by Kennedi Valenzuela RN  Outcome: Ongoing, Progressing     Problem: Fatigue  Goal: Improved Activity Tolerance  6/29/2023 1927 by Kennedi Valenzuela RN  Outcome: Ongoing, Progressing  6/29/2023 1022 by Kennedi Valenzuela RN  Outcome: Ongoing, Progressing  6/29/2023 1021 by Kennedi Valenzuela RN  Outcome: Ongoing, Progressing     Problem: Hypertension Comorbidity  Goal: Blood Pressure in Desired Range  6/29/2023 1927 by Kennedi Valenzuela RN  Outcome: Ongoing, Progressing  6/29/2023 1022 by Kennedi Valenzuela RN  Outcome: Ongoing, Progressing  6/29/2023 1021 by Kennedi Valenzuela RN  Outcome: Ongoing, Progressing     Problem: Electrolyte Imbalance  Goal: Electrolyte Balance  6/29/2023 1927 by Kennedi Valenzuela RN  Outcome: Ongoing, Progressing  6/29/2023 1022 by Kennedi Valenzuela RN  Outcome: Ongoing, Progressing  6/29/2023 1021 by Kennedi Valenzuela RN  Outcome: Ongoing, Progressing     Problem: Diarrhea  Goal: Fluid and Electrolyte Balance  6/29/2023 1927 by  Kennedi Valenzuela RN  Outcome: Ongoing, Progressing  6/29/2023 1022 by Kennedi Valenzuela RN  Outcome: Ongoing, Progressing  6/29/2023 1021 by Kennedi Valenzuela RN  Outcome: Ongoing, Progressing     Problem: Dysrhythmia  Goal: Normalized Cardiac Rhythm  6/29/2023 1927 by Kennedi Valenzuela RN  Outcome: Ongoing, Progressing  6/29/2023 1022 by Kennedi Valenzuela RN  Outcome: Ongoing, Progressing  6/29/2023 1021 by Kennedi Valenzuela RN  Outcome: Ongoing, Progressing     Problem: Parenteral Nutrition  Goal: Effective Intravenous Nutrition Therapy Delivery  6/29/2023 1927 by Kennedi Valenzuela RN  Outcome: Ongoing, Progressing  6/29/2023 1022 by Kennedi Valenzuela RN  Outcome: Ongoing, Progressing

## 2023-06-30 NOTE — ASSESSMENT & PLAN NOTE
Unknown etiology. Continue antibiotics and NPO status. Stool studies pending.     6/27/23: Suspected Crohn's disease. Biopsy results pending. Continue Flagyl and Zosyn. Continue NPO. Will start solumedrol 20mg q12h IV.   6/28/23: Continue NPO status for at least 48 hours. Will start TPN. Will closely monitor cardiac status while on solumedrol.   6/30/23: Will try advancing to clear liquid diet. Once able to advance to full liquid, will try switching to oral medications.

## 2023-06-30 NOTE — PROGRESS NOTES
[facility] - Medical Surgical Unit  [facility] Medicine  Progress Note    Patient Name: Supa Carmen  MRN: [id number]  Patient Class: IP- Inpatient   Admission Date: 6/25/2023  Length of Stay: 5 days  Attending Physician: Randy Evans DO  Primary Care Provider: Randy Evans DO        Subjective:     Principal Problem:Terminal ileitis without complication        HPI:  Supa Carmen is a 83 y.o. male who is a pt of   has a past medical history of CAD (coronary artery disease), Chemotherapy-induced neuropathy, Diverticulosis, DJD (degenerative joint disease), Gout, History of colon cancer, stage III (05/20/2011), HLD (hyperlipidemia), HTN (hypertension), and Personal history of colonic polyps (10/24/2017).. The patient presented to [facility] on 6/25/2023 with a primary complaint of right sided abdominal pain that started last night, severe in intensity , no radiation, no aggravating or relieving factors. Last BM this morning . No nausea or vomiting . Ct abd revealed terminal ileitis . Was started on zosyn and flagyl and admitted to floor .       Overview/Hospital Course:  6/26/23: Patient and labs have improved since admission. Tolerating antibiotics. Stool studies still pending. Remains NPO  6/27/23: Colonoscopy performed this AM. Suspected inflammatory bowel disease (Crohn's disease). Biopsy samples sent to pathology. Post-op complicated by suspected aspiration pneumonitis, distended abdomen, and difficulty with NG tube placement. IV solumedrol started after Cardiac workup negative.   6/28/23: Overnight significant for patient's rhythm changing to A-fib for several hours. Treated with Metoprolol IV. Chest X-ray showed developing infiltrates as expected following aspiration event. Supplemental oxygen required overnight. 500cc fluid bolus of NS given to help with hypotension. Spontaneously converted back to sinus rhythm. This morning he is feeling better. Still NPO and on IV  antibiotics. Solumedrol 20mg IV q12 started yesterday.   6/29/23: Patient has gone back into A-fib. Overall doing better. Continue NPO. Tolerating TPN. Potassium low again at 3.1, will replace and switch to TPN with potassium.   6/30/23: Doing well today. Potassium improved to 3.6. Abdomen less distended.           Review of Systems   Constitutional:  Negative for fatigue and fever.   Respiratory:  Negative for chest tightness, shortness of breath and wheezing.    Cardiovascular:  Negative for chest pain and palpitations.   Gastrointestinal:  Negative for abdominal distention and abdominal pain.   Neurological:  Negative for dizziness and headaches.   Objective:     Vital Signs (Most Recent):  Temp: 98.8 °F (37.1 °C) (06/30/23 1158)  Pulse: (!) 132 (06/30/23 1158)  Resp: 18 (06/30/23 0555)  BP: (!) 172/95 (06/30/23 1158)  SpO2: 97 % (06/30/23 1158) Vital Signs (24h Range):  Temp:  [97.6 °F (36.4 °C)-98.8 °F (37.1 °C)] 98.8 °F (37.1 °C)  Pulse:  [] 132  Resp:  [18-22] 18  SpO2:  [94 %-98 %] 97 %  BP: (138-172)/(69-98) 172/95     Weight: 77.3 kg (170 lb 6.7 oz)  Body mass index is 28.36 kg/m².    Intake/Output Summary (Last 24 hours) at 6/30/2023 1238  Last data filed at 6/30/2023 0541  Gross per 24 hour   Intake 379.09 ml   Output --   Net 379.09 ml         Physical Exam  Vitals reviewed.   Constitutional:       General: He is not in acute distress.     Appearance: Normal appearance. He is not ill-appearing.   Cardiovascular:      Rate and Rhythm: Normal rate and regular rhythm.      Pulses: Normal pulses.      Heart sounds: Normal heart sounds. No murmur heard.    No friction rub. No gallop.   Pulmonary:      Effort: No respiratory distress.      Breath sounds: No wheezing, rhonchi or rales.   Abdominal:      General: Bowel sounds are decreased. There is no distension.   Musculoskeletal:         General: No swelling.      Right lower leg: No edema.      Left lower leg: No edema.   Skin:     General: Skin is  warm and dry.   Neurological:      General: No focal deficit present.      Mental Status: He is alert.   Psychiatric:         Mood and Affect: Mood normal.         Behavior: Behavior normal.           Significant Labs: All pertinent labs within the past 24 hours have been reviewed.    Significant Imaging: I have reviewed all pertinent imaging results/findings within the past 24 hours.      Assessment/Plan:      * Terminal ileitis without complication  Unknown etiology. Continue antibiotics and NPO status. Stool studies pending.     6/27/23: Suspected Crohn's disease. Biopsy results pending. Continue Flagyl and Zosyn. Continue NPO. Will start solumedrol 20mg q12h IV.   6/28/23: Continue NPO status for at least 48 hours. Will start TPN. Will closely monitor cardiac status while on solumedrol.   6/30/23: Will try advancing to clear liquid diet. Once able to advance to full liquid, will try switching to oral medications.       Paroxysmal atrial fibrillation  Initially entered A-fib on 6/27/23 but spontaneously converted back to sinus rhythm overnight. Back in A-fib on 6/29/23. On IV metoprolol PRN.     Will consult tele-cardiology for assistance in management and evaluation. Appreciate their assistance.     Aspiration into respiratory tract  During colonoscopy on 6/27/23. Currently on Zosyn and solumedrol. Stable on Room air but did require supplemental oxygen overnight. Will continue to monitor.     6/29/23: Intermittent wheezing. Duonebs ordered. Continue Abx. Will get chest X-ray in AM. Continue to saturate well on room air.         Primary hypertension  Home medications held while patient NPO. Will order PRN hydralazine.         VTE Risk Mitigation (From admission, onward)         Ordered     IP VTE HIGH RISK PATIENT  Once         06/25/23 1120     Place sequential compression device  Until discontinued         06/25/23 1120                Discharge Planning   SRINI:      Code Status: Full Code   Is the patient  medically ready for discharge?:     Reason for patient still in hospital (select all that apply): Treatment  Discharge Plan A: Home with family, Home Health, Home   Discharge Delays: None known at this time              Randy Evans DO  Department of Hospital Medicine   [facility] - [facility]

## 2023-06-30 NOTE — PROGRESS NOTES
[facility]  General Surgery  Progress Note    Subjective:     Interval History:  83-year-old white male responding appropriately to a steroid dosing for high suspicion of Crohn's disease identified on recent colonoscopy.  Since starting the pulse steroids patient has had improvement of bowel function as well as discomfort.  Currently shown no outward signs of sepsis.  Currently appears to be responding appropriately this time.  No acute surgical needs at this time.  Post-Op Info:  Procedure(s) (LRB):  COLONOSCOPY (N/A)   3 Days Post-Op      Medications:  Continuous Infusions:   Amino acid 4.25% - dextrose 5% (CLINIMIX-E) solution (1L provides 42.5 gm AA, 50 gm CHO (170 kcal/L dextrose), Na 35, K 30, Mg 5, Ca 4.5, Acetate 70, Cl 39, Phos 15) 90 mL/hr at 06/29/23 2033     Scheduled Meds:   albuterol-ipratropium  3 mL Nebulization Q6H    famotidine (PF)  20 mg Intravenous Daily    methylPREDNISolone sodium succinate injection  20 mg Intravenous Q12H    metoprolol  5 mg Intravenous Q6H    piperacillin-tazobactam (Zosyn) IV (PEDS and ADULTS) (extended infusion is not appropriate)  4.5 g Intravenous Q8H     PRN Meds:albuterol-ipratropium, dextrose 50% in water (D50W), hydrALAZINE, HYDROmorphone, melatonin, metoprolol, ondansetron, sodium chloride 0.9%     Objective:     Vital Signs (Most Recent):  Temp: 97.9 °F (36.6 °C) (06/30/23 0400)  Pulse: 74 (06/30/23 0555)  Resp: 18 (06/30/23 0555)  BP: (!) 156/82 (06/30/23 0400)  SpO2: 97 % (06/30/23 0555) Vital Signs (24h Range):  Temp:  [97.6 °F (36.4 °C)-98.4 °F (36.9 °C)] 97.9 °F (36.6 °C)  Pulse:  [] 74  Resp:  [18-22] 18  SpO2:  [94 %-100 %] 97 %  BP: (124-157)/(69-98) 156/82       Intake/Output Summary (Last 24 hours) at 6/30/2023 0901  Last data filed at 6/30/2023 0541  Gross per 24 hour   Intake 414.82 ml   Output --   Net 414.82 ml       Physical Exam    Significant Labs:  CBC:   Recent Labs   Lab 06/30/23  0428   WBC 14.73*   RBC  3.89*   HGB 11.2*   HCT 33.4*      MCV 85.9   MCH 28.8   MCHC 33.5     CMP:   Recent Labs   Lab 06/30/23  0428   CALCIUM 8.1*      K 3.6   CO2 20*   BUN 17.0   CREATININE 0.98       Significant Diagnostics:  None    Assessment/Plan:     Active Diagnoses:    Diagnosis Date Noted POA    PRINCIPAL PROBLEM:  Terminal ileitis without complication [K50.00] 06/26/2023 Yes    Paroxysmal atrial fibrillation [I48.0] 06/29/2023 No    Aspiration into respiratory tract [T17.908A] 06/28/2023 No    Primary hypertension [I10] 02/16/2023 Yes     Chronic      Problems Resolved During this Admission:     Recommendation for careful monitoring with advancement of diet.  Continue treatment per Gastroenterology recommendations  No acute surgical needs at this time, I appreciated the opportunity to participate in the care this patient.    Marybeth Mora MD  General Surgery  [facility] - Medical Surgical Unit

## 2023-06-30 NOTE — PLAN OF CARE
Problem: Adult Inpatient Plan of Care  Goal: Plan of Care Review  Outcome: Ongoing, Progressing  Goal: Patient-Specific Goal (Individualized)  Outcome: Ongoing, Progressing  Goal: Absence of Hospital-Acquired Illness or Injury  Outcome: Ongoing, Progressing  Goal: Optimal Comfort and Wellbeing  Outcome: Ongoing, Progressing  Goal: Readiness for Transition of Care  Outcome: Ongoing, Progressing     Problem: Skin Injury Risk Increased  Goal: Skin Health and Integrity  Outcome: Ongoing, Progressing     Problem: Pain Chronic (Persistent) (Comorbidity Management)  Goal: Acceptable Pain Control and Functional Ability  Outcome: Ongoing, Progressing     Problem: Hypertension Comorbidity  Goal: Blood Pressure in Desired Range  Outcome: Ongoing, Progressing     Problem: Fall Injury Risk  Goal: Absence of Fall and Fall-Related Injury  Outcome: Ongoing, Progressing     Problem: Pain Acute  Goal: Acceptable Pain Control and Functional Ability  Outcome: Ongoing, Progressing     Problem: Fluid Volume Deficit  Goal: Fluid Balance  Outcome: Ongoing, Progressing     Problem: Infection  Goal: Absence of Infection Signs and Symptoms  Outcome: Ongoing, Progressing     Problem: Fatigue  Goal: Improved Activity Tolerance  Outcome: Ongoing, Progressing     Problem: Electrolyte Imbalance  Goal: Electrolyte Balance  Outcome: Ongoing, Progressing     Problem: Diarrhea  Goal: Fluid and Electrolyte Balance  Outcome: Ongoing, Progressing     Problem: Dysrhythmia  Goal: Normalized Cardiac Rhythm  Outcome: Ongoing, Progressing     Problem: Parenteral Nutrition  Goal: Effective Intravenous Nutrition Therapy Delivery  Outcome: Ongoing, Progressing

## 2023-07-01 NOTE — PROGRESS NOTES
[facility]  [facility] Medicine  Progress Note    Patient Name: Supa Carmen  MRN: [id number]  Patient Class: IP- Inpatient   Admission Date: 6/25/2023  Length of Stay: 6 days  Attending Physician: Randy Evans DO  Primary Care Provider: Randy Evans DO        Subjective:     Principal Problem:Terminal ileitis without complication        HPI:  Supa Carmen is a 83 y.o. male who is a pt of   has a past medical history of CAD (coronary artery disease), Chemotherapy-induced neuropathy, Diverticulosis, DJD (degenerative joint disease), Gout, History of colon cancer, stage III (05/20/2011), HLD (hyperlipidemia), HTN (hypertension), and Personal history of colonic polyps (10/24/2017).. The patient presented to [facility] on 6/25/2023 with a primary complaint of right sided abdominal pain that started last night, severe in intensity , no radiation, no aggravating or relieving factors. Last BM this morning . No nausea or vomiting . Ct abd revealed terminal ileitis . Was started on zosyn and flagyl and admitted to floor .       Overview/Hospital Course:  6/26/23: Patient and labs have improved since admission. Tolerating antibiotics. Stool studies still pending. Remains NPO  6/27/23: Colonoscopy performed this AM. Suspected inflammatory bowel disease (Crohn's disease). Biopsy samples sent to pathology. Post-op complicated by suspected aspiration pneumonitis, distended abdomen, and difficulty with NG tube placement. IV solumedrol started after Cardiac workup negative.   6/28/23: Overnight significant for patient's rhythm changing to A-fib for several hours. Treated with Metoprolol IV. Chest X-ray showed developing infiltrates as expected following aspiration event. Supplemental oxygen required overnight. 500cc fluid bolus of NS given to help with hypotension. Spontaneously converted back to sinus rhythm. This morning he is feeling better. Still NPO and on IV  antibiotics. Solumedrol 20mg IV q12 started yesterday.   6/29/23: Patient has gone back into A-fib. Overall doing better. Continue NPO. Tolerating TPN. Potassium low again at 3.1, will replace and switch to TPN with potassium.   6/30/23: Doing well today. Potassium improved to 3.6. Abdomen less distended.   07/01/2023 pt awake and alert admitted with abdominal pain colonoscopy shows suspected Chron's disease, had some suspected aspiration at time of colonoscopy.  He is imroving and tolerated clear liquid diet x 2 days.  H/o Afib HR currently 100-110s,           Review of Systems   Constitutional:  Negative for fever.   Respiratory:  Negative for chest tightness, shortness of breath and wheezing.    Cardiovascular:  Negative for chest pain and palpitations.   Gastrointestinal:  Negative for abdominal distention and abdominal pain.   Objective:     Vital Signs (Most Recent):  Temp: 97.6 °F (36.4 °C) (07/01/23 0800)  Pulse: 91 (07/01/23 0800)  Resp: 20 (07/01/23 0800)  BP: 130/77 (07/01/23 0800)  SpO2: (!) 93 % (07/01/23 0800) Vital Signs (24h Range):  Temp:  [97.6 °F (36.4 °C)-98.8 °F (37.1 °C)] 97.6 °F (36.4 °C)  Pulse:  [] 91  Resp:  [18-24] 20  SpO2:  [93 %-98 %] 93 %  BP: (130-172)/(77-96) 130/77     Weight: 77.3 kg (170 lb 6.7 oz)  Body mass index is 28.36 kg/m².    Intake/Output Summary (Last 24 hours) at 7/1/2023 1029  Last data filed at 7/1/2023 0813  Gross per 24 hour   Intake 2521.71 ml   Output --   Net 2521.71 ml           Physical Exam  Vitals reviewed.   Constitutional:       General: He is not in acute distress.     Appearance: Normal appearance. He is not ill-appearing.   Cardiovascular:      Rate and Rhythm: Normal rate and regular rhythm.      Pulses: Normal pulses.      Heart sounds: Normal heart sounds. No murmur heard.    No friction rub. No gallop.   Pulmonary:      Effort: No respiratory distress.      Breath sounds: No wheezing, rhonchi or rales.   Abdominal:      General: Bowel sounds  are decreased. There is no distension.      Palpations: There is no mass.      Tenderness: There is no abdominal tenderness.   Musculoskeletal:      Right lower leg: No edema.      Left lower leg: No edema.   Skin:     Findings: No lesion or rash.   Neurological:      General: No focal deficit present.      Mental Status: He is alert. Mental status is at baseline.   Psychiatric:         Mood and Affect: Mood normal.         Behavior: Behavior normal.           Significant Labs: All pertinent labs within the past 24 hours have been reviewed.    Significant Imaging: I have reviewed all pertinent imaging results/findings within the past 24 hours.      Assessment/Plan:      * Terminal ileitis without complication  Unknown etiology. Continue antibiotics and NPO status. Stool studies pending.     6/27/23: Suspected Crohn's disease. Biopsy results pending. Continue Flagyl and Zosyn. Continue NPO. Will start solumedrol 20mg q12h IV.   6/28/23: Continue NPO status for at least 48 hours. Will start TPN. Will closely monitor cardiac status while on solumedrol.   6/30/23: Will try advancing to clear liquid diet. Once able to advance to full liquid, will try switching to oral medications.   07/01/2023 advance to full liquid diet  Transition to po meds    Paroxysmal atrial fibrillation  Transition back to po meds  CIS recommends 1mg/kg lovenox bid  High chadsvasc score    Aspiration into respiratory tract  During colonoscopy on 6/27/23. Currently on Zosyn and solumedrol. Stable on Room air but did require supplemental oxygen overnight. Will continue to monitor.     6/29/23: Intermittent wheezing. Duonebs ordered. Continue Abx. Will get chest X-ray in AM. Continue to saturate well on room air.   07/01/2023 lungs are clear, continue abx off oxygen      Primary hypertension  Chronic, controlled.  Latest blood pressure and vitals reviewed-   Temp:  [97.6 °F (36.4 °C)-98.8 °F (37.1 °C)]   Pulse:  []   Resp:  [18-24]   BP:  (130-172)/(77-96)   SpO2:  [93 %-98 %] .   Home meds for hypertension were reviewed and noted below.   Hypertension Medications             atenoloL (TENORMIN) 25 MG tablet TAKE 1 TABLET BY MOUTH EVERY DAY    5/2/23 1616 - Original Entry by Sunitha Del Rio LPN  Authorizing Provider: Randy Evans DO             Name:  atenoloL (TENORMIN) 25 MG tablet Start date:  5/2/23 End date:  --    Medication:  ATENOLOL  25 MG ORAL TAB [[id number]]    Dose:  -- Route:  -- Frequency:  --    Sig: TAKE 1 TABLET BY MOUTH EVERY DAY    Instructions:  --           Changes to the order are displayed in red.  Note that there may be changes made to the order that are not shown in this report, such as changes to details about ingredients.     losartan (COZAAR) 50 MG tablet TAKE 1 TABLET BY MOUTH EVERY DAY    12/27/22 1522 - Original Entry by Randy Evans DO  Authorizing Provider: Randy Evans DO             Name:  losartan (COZAAR) 50 MG tablet Start date:  12/27/22 End date:  --    Medication:  LOSARTAN  50 MG ORAL TAB [[id number]]    Dose:  -- Route:  -- Frequency:  --    Sig: TAKE 1 TABLET BY MOUTH EVERY DAY    Instructions:  --           Changes to the order are displayed in red.  Note that there may be changes made to the order that are not shown in this report, such as changes to details about ingredients.               While in the hospital, will manage blood pressure as follows; Continue home antihypertensive regimen    Will utilize p.r.n. blood pressure medication only if patient's blood pressure greater than  180/110 and he develops symptoms such as worsening chest pain or shortness of breath.          VTE Risk Mitigation (From admission, onward)         Ordered     enoxaparin injection 80 mg  Every 12 hours         07/01/23 1036     IP VTE HIGH RISK PATIENT  Once         06/25/23 1120     Place sequential compression device  Until discontinued         06/25/23 1120                Discharge Planning   SRINI:      Code Status:  Full Code   Is the patient medically ready for discharge?:     Reason for patient still in hospital (select all that apply): Patient trending condition, Treatment and Consult recommendations  Discharge Plan A: Home with family, Home Health, Home   Discharge Delays: None known at this time              Tabby Khan MD  Department of Hospital Medicine   [facility]

## 2023-07-01 NOTE — ASSESSMENT & PLAN NOTE
Chronic, controlled.  Latest blood pressure and vitals reviewed-   Temp:  [97.6 °F (36.4 °C)-98.8 °F (37.1 °C)]   Pulse:  []   Resp:  [18-24]   BP: (130-172)/(77-96)   SpO2:  [93 %-98 %] .   Home meds for hypertension were reviewed and noted below.   Hypertension Medications             atenoloL (TENORMIN) 25 MG tablet TAKE 1 TABLET BY MOUTH EVERY DAY    5/2/23 1616 - Original Entry by Sunitha Del Rio LPN  Authorizing Provider: Randy Evans DO             Name:  atenoloL (TENORMIN) 25 MG tablet Start date:  5/2/23 End date:  --    Medication:  ATENOLOL  25 MG ORAL TAB [[id number]]    Dose:  -- Route:  -- Frequency:  --    Sig: TAKE 1 TABLET BY MOUTH EVERY DAY    Instructions:  --           Changes to the order are displayed in red.  Note that there may be changes made to the order that are not shown in this report, such as changes to details about ingredients.     losartan (COZAAR) 50 MG tablet TAKE 1 TABLET BY MOUTH EVERY DAY    12/27/22 1522 - Original Entry by Randy Evans DO  Authorizing Provider: Randy Evans DO             Name:  losartan (COZAAR) 50 MG tablet Start date:  12/27/22 End date:  --    Medication:  LOSARTAN  50 MG ORAL TAB [[id number]]    Dose:  -- Route:  -- Frequency:  --    Sig: TAKE 1 TABLET BY MOUTH EVERY DAY    Instructions:  --           Changes to the order are displayed in red.  Note that there may be changes made to the order that are not shown in this report, such as changes to details about ingredients.               While in the hospital, will manage blood pressure as follows; Continue home antihypertensive regimen    Will utilize p.r.n. blood pressure medication only if patient's blood pressure greater than  180/110 and he develops symptoms such as worsening chest pain or shortness of breath.

## 2023-07-01 NOTE — ASSESSMENT & PLAN NOTE
Unknown etiology. Continue antibiotics and NPO status. Stool studies pending.     6/27/23: Suspected Crohn's disease. Biopsy results pending. Continue Flagyl and Zosyn. Continue NPO. Will start solumedrol 20mg q12h IV.   6/28/23: Continue NPO status for at least 48 hours. Will start TPN. Will closely monitor cardiac status while on solumedrol.   6/30/23: Will try advancing to clear liquid diet. Once able to advance to full liquid, will try switching to oral medications.   07/01/2023 advance to full liquid diet  Transition to po meds

## 2023-07-01 NOTE — ASSESSMENT & PLAN NOTE
During colonoscopy on 6/27/23. Currently on Zosyn and solumedrol. Stable on Room air but did require supplemental oxygen overnight. Will continue to monitor.     6/29/23: Intermittent wheezing. Duonebs ordered. Continue Abx. Will get chest X-ray in AM. Continue to saturate well on room air.   07/01/2023 lungs are clear, continue abx off oxygen

## 2023-07-01 NOTE — SUBJECTIVE & OBJECTIVE
Review of Systems   Constitutional:  Negative for fever.   Respiratory:  Negative for chest tightness, shortness of breath and wheezing.    Cardiovascular:  Negative for chest pain and palpitations.   Gastrointestinal:  Negative for abdominal distention and abdominal pain.   Objective:     Vital Signs (Most Recent):  Temp: 97.6 °F (36.4 °C) (07/01/23 0800)  Pulse: 91 (07/01/23 0800)  Resp: 20 (07/01/23 0800)  BP: 130/77 (07/01/23 0800)  SpO2: (!) 93 % (07/01/23 0800) Vital Signs (24h Range):  Temp:  [97.6 °F (36.4 °C)-98.8 °F (37.1 °C)] 97.6 °F (36.4 °C)  Pulse:  [] 91  Resp:  [18-24] 20  SpO2:  [93 %-98 %] 93 %  BP: (130-172)/(77-96) 130/77     Weight: 77.3 kg (170 lb 6.7 oz)  Body mass index is 28.36 kg/m².    Intake/Output Summary (Last 24 hours) at 7/1/2023 1029  Last data filed at 7/1/2023 0813  Gross per 24 hour   Intake 2521.71 ml   Output --   Net 2521.71 ml           Physical Exam  Vitals reviewed.   Constitutional:       General: He is not in acute distress.     Appearance: Normal appearance. He is not ill-appearing.   Cardiovascular:      Rate and Rhythm: Normal rate and regular rhythm.      Pulses: Normal pulses.      Heart sounds: Normal heart sounds. No murmur heard.    No friction rub. No gallop.   Pulmonary:      Effort: No respiratory distress.      Breath sounds: No wheezing, rhonchi or rales.   Abdominal:      General: Bowel sounds are decreased. There is no distension.      Palpations: There is no mass.      Tenderness: There is no abdominal tenderness.   Musculoskeletal:      Right lower leg: No edema.      Left lower leg: No edema.   Skin:     Findings: No lesion or rash.   Neurological:      General: No focal deficit present.      Mental Status: He is alert. Mental status is at baseline.   Psychiatric:         Mood and Affect: Mood normal.         Behavior: Behavior normal.           Significant Labs: All pertinent labs within the past 24 hours have been reviewed.    Significant  Imaging: I have reviewed all pertinent imaging results/findings within the past 24 hours.

## 2023-07-02 NOTE — SUBJECTIVE & OBJECTIVE
Review of Systems   Constitutional:  Negative for fever.   Respiratory:  Negative for chest tightness, shortness of breath and wheezing.    Cardiovascular:  Negative for chest pain and palpitations.   Gastrointestinal:  Negative for abdominal distention and abdominal pain.   Objective:     Vital Signs (Most Recent):  Temp: 97.7 °F (36.5 °C) (07/02/23 0800)  Pulse: 76 (07/02/23 0825)  Resp: 20 (07/02/23 0800)  BP: 102/61 (07/02/23 0825)  SpO2: (!) 92 % (07/02/23 0800) Vital Signs (24h Range):  Temp:  [97.6 °F (36.4 °C)-98.6 °F (37 °C)] 97.7 °F (36.5 °C)  Pulse:  [] 76  Resp:  [20-28] 20  SpO2:  [92 %-98 %] 92 %  BP: ()/(61-84) 102/61     Weight: 77.3 kg (170 lb 6.7 oz)  Body mass index is 28.36 kg/m².    Intake/Output Summary (Last 24 hours) at 7/2/2023 1209  Last data filed at 7/2/2023 0815  Gross per 24 hour   Intake 660.07 ml   Output --   Net 660.07 ml           Physical Exam  Vitals reviewed.   Constitutional:       General: He is not in acute distress.     Appearance: Normal appearance. He is not ill-appearing.   Cardiovascular:      Rate and Rhythm: Normal rate and regular rhythm.      Pulses: Normal pulses.      Heart sounds: Normal heart sounds. No murmur heard.    No friction rub. No gallop.   Pulmonary:      Effort: No respiratory distress.      Breath sounds: No wheezing, rhonchi or rales.   Abdominal:      General: Bowel sounds are decreased. There is no distension.      Palpations: There is no mass.      Tenderness: There is no abdominal tenderness.   Musculoskeletal:      Right lower leg: No edema.      Left lower leg: No edema.   Skin:     Findings: No lesion or rash.   Neurological:      General: No focal deficit present.      Mental Status: He is alert. Mental status is at baseline.   Psychiatric:         Mood and Affect: Mood normal.         Behavior: Behavior normal.           Significant Labs: All pertinent labs within the past 24 hours have been reviewed.    Significant Imaging:  I have reviewed all pertinent imaging results/findings within the past 24 hours.   Clofazimine Counseling:  I discussed with the patient the risks of clofazimine including but not limited to skin and eye pigmentation, liver damage, nausea/vomiting, gastrointestinal bleeding and allergy.

## 2023-07-02 NOTE — PROGRESS NOTES
[facility]  [facility] Medicine  Progress Note    Patient Name: Supa Carmen  MRN: [id number]  Patient Class: IP- Inpatient   Admission Date: 6/25/2023  Length of Stay: 7 days  Attending Physician: Randy Evans DO  Primary Care Provider: Randy Evans DO        Subjective:     Principal Problem:Terminal ileitis without complication        HPI:  Supa Carmen is a 83 y.o. male who is a pt of   has a past medical history of CAD (coronary artery disease), Chemotherapy-induced neuropathy, Diverticulosis, DJD (degenerative joint disease), Gout, History of colon cancer, stage III (05/20/2011), HLD (hyperlipidemia), HTN (hypertension), and Personal history of colonic polyps (10/24/2017).. The patient presented to [facility] on 6/25/2023 with a primary complaint of right sided abdominal pain that started last night, severe in intensity , no radiation, no aggravating or relieving factors. Last BM this morning . No nausea or vomiting . Ct abd revealed terminal ileitis . Was started on zosyn and flagyl and admitted to floor .       Overview/Hospital Course:  6/26/23: Patient and labs have improved since admission. Tolerating antibiotics. Stool studies still pending. Remains NPO  6/27/23: Colonoscopy performed this AM. Suspected inflammatory bowel disease (Crohn's disease). Biopsy samples sent to pathology. Post-op complicated by suspected aspiration pneumonitis, distended abdomen, and difficulty with NG tube placement. IV solumedrol started after Cardiac workup negative.   6/28/23: Overnight significant for patient's rhythm changing to A-fib for several hours. Treated with Metoprolol IV. Chest X-ray showed developing infiltrates as expected following aspiration event. Supplemental oxygen required overnight. 500cc fluid bolus of NS given to help with hypotension. Spontaneously converted back to sinus rhythm. This morning he is feeling better. Still NPO and on IV  antibiotics. Solumedrol 20mg IV q12 started yesterday.   6/29/23: Patient has gone back into A-fib. Overall doing better. Continue NPO. Tolerating TPN. Potassium low again at 3.1, will replace and switch to TPN with potassium.   6/30/23: Doing well today. Potassium improved to 3.6. Abdomen less distended.   07/01/2023 pt awake and alert admitted with abdominal pain colonoscopy shows suspected Chron's disease, had some suspected aspiration at time of colonoscopy.  He is imroving and tolerated clear liquid diet x 2 days.  H/o Afib HR currently 100-110s,   07/02/2023 tolerating diet this am, BP on low side giving atenolol this am and will hold losartan pending his BP as day proceeds.Pt is asymptomatic          Review of Systems   Constitutional:  Negative for fever.   Respiratory:  Negative for chest tightness, shortness of breath and wheezing.    Cardiovascular:  Negative for chest pain and palpitations.   Gastrointestinal:  Negative for abdominal distention and abdominal pain.   Objective:     Vital Signs (Most Recent):  Temp: 97.7 °F (36.5 °C) (07/02/23 0800)  Pulse: 76 (07/02/23 0825)  Resp: 20 (07/02/23 0800)  BP: 102/61 (07/02/23 0825)  SpO2: (!) 92 % (07/02/23 0800) Vital Signs (24h Range):  Temp:  [97.6 °F (36.4 °C)-98.6 °F (37 °C)] 97.7 °F (36.5 °C)  Pulse:  [] 76  Resp:  [20-28] 20  SpO2:  [92 %-98 %] 92 %  BP: ()/(61-84) 102/61     Weight: 77.3 kg (170 lb 6.7 oz)  Body mass index is 28.36 kg/m².    Intake/Output Summary (Last 24 hours) at 7/2/2023 1209  Last data filed at 7/2/2023 0815  Gross per 24 hour   Intake 660.07 ml   Output --   Net 660.07 ml           Physical Exam  Vitals reviewed.   Constitutional:       General: He is not in acute distress.     Appearance: Normal appearance. He is not ill-appearing.   Cardiovascular:      Rate and Rhythm: Normal rate and regular rhythm.      Pulses: Normal pulses.      Heart sounds: Normal heart sounds. No murmur heard.    No friction rub. No gallop.    Pulmonary:      Effort: No respiratory distress.      Breath sounds: No wheezing, rhonchi or rales.   Abdominal:      General: Bowel sounds are decreased. There is no distension.      Palpations: There is no mass.      Tenderness: There is no abdominal tenderness.   Musculoskeletal:      Right lower leg: No edema.      Left lower leg: No edema.   Skin:     Findings: No lesion or rash.   Neurological:      General: No focal deficit present.      Mental Status: He is alert. Mental status is at baseline.   Psychiatric:         Mood and Affect: Mood normal.         Behavior: Behavior normal.           Significant Labs: All pertinent labs within the past 24 hours have been reviewed.    Significant Imaging: I have reviewed all pertinent imaging results/findings within the past 24 hours.      Assessment/Plan:      * Terminal ileitis without complication  Unknown etiology. Continue antibiotics and NPO status. Stool studies pending.     6/27/23: Suspected Crohn's disease. Biopsy results pending. Continue Flagyl and Zosyn. Continue NPO. Will start solumedrol 20mg q12h IV.   6/28/23: Continue NPO status for at least 48 hours. Will start TPN. Will closely monitor cardiac status while on solumedrol.   6/30/23: Will try advancing to clear liquid diet. Once able to advance to full liquid, will try switching to oral medications.   07/01/2023 advance to full liquid diet  Transition to po meds    Paroxysmal atrial fibrillation  Transition back to po meds  Continue atenolol  Will resume eliquis today  High chadsvasc score    Aspiration into respiratory tract  During colonoscopy on 6/27/23. Currently on Zosyn and solumedrol. Stable on Room air but did require supplemental oxygen overnight. Will continue to monitor.     6/29/23: Intermittent wheezing. Duonebs ordered. Continue Abx. Will get chest X-ray in AM. Continue to saturate well on room air.   07/01/2023 lungs are clear, continue abx off oxygen      Primary  hypertension  Chronic, controlled.  Latest blood pressure and vitals reviewed-   Temp:  [97.6 °F (36.4 °C)-98.8 °F (37.1 °C)]   Pulse:  []   Resp:  [18-24]   BP: (130-172)/(77-96)   SpO2:  [93 %-98 %] .   Home meds for hypertension were reviewed and noted below.   Hypertension Medications             atenoloL (TENORMIN) 25 MG tablet TAKE 1 TABLET BY MOUTH EVERY DAY    5/2/23 1616 - Original Entry by Sunitha Del Rio LPN  Authorizing Provider: Randy Evans DO             Name:  atenoloL (TENORMIN) 25 MG tablet Start date:  5/2/23 End date:  --    Medication:  ATENOLOL  25 MG ORAL TAB [[id number]]    Dose:  -- Route:  -- Frequency:  --    Sig: TAKE 1 TABLET BY MOUTH EVERY DAY    Instructions:  --           Changes to the order are displayed in red.  Note that there may be changes made to the order that are not shown in this report, such as changes to details about ingredients.     losartan (COZAAR) 50 MG tablet TAKE 1 TABLET BY MOUTH EVERY DAY    12/27/22 1522 - Original Entry by Randy Evans DO  Authorizing Provider: Randy Evans DO             Name:  losartan (COZAAR) 50 MG tablet Start date:  12/27/22 End date:  --    Medication:  LOSARTAN  50 MG ORAL TAB [[id number]]    Dose:  -- Route:  -- Frequency:  --    Sig: TAKE 1 TABLET BY MOUTH EVERY DAY    Instructions:  --           Changes to the order are displayed in red.  Note that there may be changes made to the order that are not shown in this report, such as changes to details about ingredients.               While in the hospital, will manage blood pressure as follows; Continue home antihypertensive regimen    Will utilize p.r.n. blood pressure medication only if patient's blood pressure greater than  180/110 and he develops symptoms such as worsening chest pain or shortness of breath.          VTE Risk Mitigation (From admission, onward)         Ordered     apixaban tablet 5 mg  2 times daily         07/02/23 1212     IP VTE HIGH RISK PATIENT  Once          06/25/23 1120     Place sequential compression device  Until discontinued         06/25/23 1120                Discharge Planning   SRINI:      Code Status: Full Code   Is the patient medically ready for discharge?:     Reason for patient still in hospital (select all that apply): Patient trending condition and Treatment  Discharge Plan A: Home with family, Home Health, Home   Discharge Delays: None known at this time              Tabby Khan MD  Department of Hospital Medicine   [facility]

## 2023-07-02 NOTE — PLAN OF CARE
Problem: Adult Inpatient Plan of Care  Goal: Plan of Care Review  Outcome: Ongoing, Progressing  Goal: Patient-Specific Goal (Individualized)  Outcome: Ongoing, Progressing  Goal: Absence of Hospital-Acquired Illness or Injury  Outcome: Ongoing, Progressing  Goal: Optimal Comfort and Wellbeing  Outcome: Ongoing, Progressing  Goal: Readiness for Transition of Care  Outcome: Ongoing, Progressing     Problem: Skin Injury Risk Increased  Goal: Skin Health and Integrity  Outcome: Ongoing, Progressing     Problem: Hypertension Comorbidity  Goal: Blood Pressure in Desired Range  Outcome: Ongoing, Progressing     Problem: Fall Injury Risk  Goal: Absence of Fall and Fall-Related Injury  Outcome: Ongoing, Progressing     Problem: Pain Acute  Goal: Acceptable Pain Control and Functional Ability  Outcome: Ongoing, Progressing     Problem: Fluid Volume Deficit  Goal: Fluid Balance  Outcome: Ongoing, Progressing     Problem: Infection  Goal: Absence of Infection Signs and Symptoms  Outcome: Ongoing, Progressing     Problem: Fatigue  Goal: Improved Activity Tolerance  Outcome: Ongoing, Progressing     Problem: Electrolyte Imbalance  Goal: Electrolyte Balance  Outcome: Ongoing, Progressing     Problem: Diarrhea  Goal: Fluid and Electrolyte Balance  Outcome: Ongoing, Progressing     Problem: Dysrhythmia  Goal: Normalized Cardiac Rhythm  Outcome: Ongoing, Progressing     Problem: Parenteral Nutrition  Goal: Effective Intravenous Nutrition Therapy Delivery  Outcome: Ongoing, Progressing

## 2023-07-03 PROBLEM — K50.00 TERMINAL ILEITIS WITHOUT COMPLICATION: Status: RESOLVED | Noted: 2023-01-01 | Resolved: 2023-01-01

## 2023-07-03 PROBLEM — K50.80 CROHN'S DISEASE OF BOTH SMALL AND LARGE INTESTINE WITHOUT COMPLICATION: Chronic | Status: ACTIVE | Noted: 2023-01-01

## 2023-07-03 PROBLEM — T17.908A ASPIRATION INTO RESPIRATORY TRACT: Status: RESOLVED | Noted: 2023-01-01 | Resolved: 2023-01-01

## 2023-07-03 NOTE — DISCHARGE INSTRUCTIONS
Patient discharged home with follow up scheduled with Dr Evans.  Return to Emergency room with any complaints of Shortness of breath, chest pain, slurred speech, or weakness.  Take all medications as scheduled and do no stop unless ordered by physcian.  An appointment with a cardiologist and a gastric doctor will be made and you will be notified with date and times.

## 2023-07-03 NOTE — PLAN OF CARE
Problem: Adult Inpatient Plan of Care  Goal: Plan of Care Review  Outcome: Met  Goal: Patient-Specific Goal (Individualized)  Outcome: Met  Goal: Absence of Hospital-Acquired Illness or Injury  Outcome: Met  Goal: Optimal Comfort and Wellbeing  Outcome: Met  Goal: Readiness for Transition of Care  Outcome: Met     Problem: Skin Injury Risk Increased  Goal: Skin Health and Integrity  Outcome: Met     Problem: Pain Chronic (Persistent) (Comorbidity Management)  Goal: Acceptable Pain Control and Functional Ability  Outcome: Met     Problem: Fall Injury Risk  Goal: Absence of Fall and Fall-Related Injury  Outcome: Met     Problem: Pain Acute  Goal: Acceptable Pain Control and Functional Ability  Outcome: Met     Problem: Fluid Volume Deficit  Goal: Fluid Balance  Outcome: Met     Problem: Infection  Goal: Absence of Infection Signs and Symptoms  Outcome: Met     Problem: Fatigue  Goal: Improved Activity Tolerance  Outcome: Met     Problem: Hypertension Comorbidity  Goal: Blood Pressure in Desired Range  Outcome: Met     Problem: Electrolyte Imbalance  Goal: Electrolyte Balance  Outcome: Met     Problem: Diarrhea  Goal: Fluid and Electrolyte Balance  Outcome: Met     Problem: Dysrhythmia  Goal: Normalized Cardiac Rhythm  Outcome: Met     Problem: Parenteral Nutrition  Goal: Effective Intravenous Nutrition Therapy Delivery  Outcome: Met     Problem: Adjustment to Illness (Bowel Disease, Inflammatory)  Goal: Optimal Adaptation to Chronic Illness  Outcome: Met     Problem: Diarrhea (Bowel Disease, Inflammatory)  Goal: Diarrhea Symptom Relief  Outcome: Met     Problem: Infection (Bowel Disease, Inflammatory)  Goal: Absence of Infection Signs and Symptoms  Outcome: Met     Problem: Nutrition Impaired (Bowel Disease, Inflammatory)  Goal: Optimal Nutrition  Outcome: Met     Problem: Pain (Bowel Disease, Inflammatory)  Goal: Acceptable Pain Control  Outcome: Met

## 2023-07-03 NOTE — PROGRESS NOTES
Inpatient Nutrition Assessment    Admit Date: 6/25/2023   Total duration of encounter: 8 days     Nutrition Recommendation/Prescription     Continue current diet of Heart Healthy  Daily weights    Communication of Recommendations:  EMR    Nutrition Assessment     Malnutrition Assessment/Nutrition-Focused Physical Exam    Malnutrition Context: acute illness or injury  Malnutrition Level: other (see comments) (Does not meet criteria)  Energy Intake (Malnutrition): less than or equal to 50% for greater than or equal to 5 days  Weight Loss (Malnutrition): other (see comments) (Does not meet criteria)  Subcutaneous Fat (Malnutrition): other (see comments) (Does not meet criteria)  Orbital Region (Subcutaneous Fat Loss): well nourished  Upper Arm Region (Subcutaneous Fat Loss): well nourished  Thoracic and Lumbar Region: well nourished  Muscle Mass (Malnutrition): other (see comments) (Does not meet criteria)  [place] Region (Muscle Loss): well nourished  Clavicle Bone Region (Muscle Loss): well nourished  Clavicle and Acromion Bone Region (Muscle Loss): well nourished  Scapular Bone Region (Muscle Loss): well nourished  Dorsal Hand (Muscle Loss): well nourished  Patellar Region (Muscle Loss): well nourished  Anterior Thigh Region (Muscle Loss): well nourished  Posterior Calf Region (Muscle Loss): well nourished  Fluid Accumulation (Malnutrition):  (Does not meet criteria)  Hand  Strength, Left (Malnutrition): Does not meet criteria  Hand  Strength, Right (Malnutrition): Does not meet criteria  A minimum of two characteristics is recommended for diagnosis of either severe or non-severe malnutrition.    Chart Review    Reason Seen: continuous nutrition monitoring and follow-up    Malnutrition Screening Tool Results   Have you recently lost weight without trying?: No  Have you been eating poorly because of a decreased appetite?: No   MST Score: 0     Diagnosis:  Terminal Ileitis, Possible Ileus related to ileitis, Hx  "HTN-now hypotension, sepsis,hypokalemia    Relevant Medical History:   HTN, Chemo induced neuropathy, HLD, hx colon cancer (stage III), gout, hx colonic polyps, DJD, CAD, diverticulosis. Surgical hx noted.     Nutrition-Related Medications: Pepcid, Metronidazole, Zosyn, D5 & 0.45% NaCl with Kcl @ 100 mLhr  Calorie Containing IV Medications: no significant kcals from medications at this time    Nutrition-Related Labs:  (6.26) H/H 11L/32.9L Neutrophils 22.0432H K+3.3L BUN 8L Cr 1.33H Ca+ 7.6L   (6.28) WBC 20,350 H (increasing) H/H 11.3L/33.9L BUN 4L Cr 1.25H Glu 152H Ca+ 7.7L Alb 1.9L  (6.30) WBC 14,730H H/H 11.2L/33.4L Glu 119H Ca+ 8.1L Alb 1.9L Alk Phos 27L     Diet/PN Order: Diet heart healthy  Oral Supplement Order: none  Tube Feeding Order: none  Appetite/Oral Intake: fair/25-50% of meals  Factors Affecting Nutritional Intake: altered gastrointestinal function  Food/Mu-ism/Cultural Preferences: none reported  Food Allergies: none reported       Wound(s):   Intact.  (6.28) Skin remains intact.  (7.3) Skin intact.    Comments    (6.26) Pt was just discharged from hospital sometime last week with obstruction. Met with pt and family.Pt has poor appetite (consuming <50% for >5 days) and intake PTA on 6.25.23.Pt denied nausea, vomiting, diarrhea or constipation. No chewing or swallowing issues. Pt and family stated #.   (6.28) Colonoscopy was preformed on 6.27.23. Suspected inflammatory bowel disease (Crohn's dx). Post op complicated by suspected aspiration pneumonitis, distended abdomen and difficulty with NG tube placement.   (6.30) Pt's diet upgraded today to Clear Liquids.  Continues to tolerate PPN.   (7.3) Met with nurse. Pt in shower. PPN has been d/c'd and pt is tolerating current diet of Heart Healthy. Intake is fair (improving). Consuming 25-50% of meals. No nausea, vomiting, constipation, or diarrhea per nurse. No issues chewing or swallowing.     Anthropometrics    Height: 5' 5" (165.1 cm) Height " Method: Stated  Last Weight: 77.3 kg (170 lb 6.7 oz) (23 1115) Weight Method: Bed Scale  BMI (Calculated): 28.4  BMI Classification: overweight (BMI 25-29.9)          Ideal Body Weight (IBW), Male: 136 lb     % Ideal Body Weight, Male (lb): 125.31 %                 Usual Body Weight (UBW), k.3 kg  % Usual Body Weight: 100.21     Usual Weight Provided By: patient and family/caregiver    Wt Readings from Last 5 Encounters:   23 77.3 kg (170 lb 6.7 oz)   06/15/23 76.9 kg (169 lb 8.5 oz)   23 77.3 kg (170 lb 6.4 oz)   23 77.1 kg (170 lb)   23 78.5 kg (173 lb)     Weight Change(s) Since Admission:  Admit Weight: 77.1 kg (170 lb) (23 0852)  (6.28) No new wt recorded.  (6.30) No new wt recorded.  (7.3)   No wt recorded.      Estimated Needs    Weight Used For Calorie Calculations: 77.3 kg (170 lb 6.7 oz)  Energy Calorie Requirements (kcal): 1934 kcal (30 kcal/kg)  Energy Need Method: Kcal/kg  Weight Used For Protein Calculations: 77.3 kg (170 lb 6.7 oz)  Protein Requirements: 92 gm (1.2 gm/kg)  Fluid Requirements (mL): 1933 mL (1 mL/kcal)  Temp (24hrs), Av.9 °F (36.6 °C), Min:97.5 °F (36.4 °C), Max:98.3 °F (36.8 °C)       Enteral Nutrition    Patient not receiving enteral nutrition at this time.    Parenteral Nutrition    Standard Formula: not applicable  Custom Formula:     Additives: none  Rate/Volume:   Lipids: none  Total Nutrition Provided by Parenteral Nutrition:      Evaluation of Received Nutrient Intake    Calories: not meeting estimated needs  Protein: not meeting estimated needs    Patient Education    Not applicable.    Nutrition Diagnosis     PES: Altered GI function related to acute illness as evidenced by dx terminal ileitis, possible ileus related to ileitis. (continues)    Interventions/Goals     Intervention(s): general/healthful diet and collaboration with other providers  Goal: Consume % of meals/snacks by follow-up. (goal progressing)    Monitoring &  Evaluation     Dietitian will monitor food and beverage intake, weight change, electrolyte/renal panel, glucose/endocrine profile, and gastrointestinal profile.  Nutrition Risk/Follow-Up: high (follow-up in 1-4 days)   Please consult if re-assessment needed sooner.

## 2023-07-03 NOTE — NURSING
Spoke with wife Mrs Argueta about follow-up appointments for gastroenteroloy (Dr Vargas), and Cardiology (Mayito Ibanez, NP with [facility]).  The current discharge follow up appointment list with all information was given to son, Kenneth.  Mrs Argueta aware.

## 2023-07-03 NOTE — DISCHARGE SUMMARY
[facility] - Medical Surgical Unit  [facility] Medicine  Discharge Summary      Patient Name: Supa Carmen  MRN: [id number]  JETHRO: [id number]  Patient Class: IP- Inpatient  Admission Date: 6/25/2023  Hospital Length of Stay: 8 days  Discharge Date and Time:  07/03/2023 12:21 PM  Attending Physician: Randy Evans DO   Discharging Provider: Randy Evans DO  Primary Care Provider: Randy Evans DO    Primary Care Team: Networked reference to record PCT     HPI:   Supa Carmen is a 83 y.o. male who is a pt of   has a past medical history of CAD (coronary artery disease), Chemotherapy-induced neuropathy, Diverticulosis, DJD (degenerative joint disease), Gout, History of colon cancer, stage III (05/20/2011), HLD (hyperlipidemia), HTN (hypertension), and Personal history of colonic polyps (10/24/2017).. The patient presented to [facility] on 6/25/2023 with a primary complaint of right sided abdominal pain that started last night, severe in intensity , no radiation, no aggravating or relieving factors. Last BM this morning . No nausea or vomiting . Ct abd revealed terminal ileitis . Was started on zosyn and flagyl and admitted to floor .       Procedure(s) (LRB):  COLONOSCOPY (N/A)      Hospital Course:   6/26/23: Patient and labs have improved since admission. Tolerating antibiotics. Stool studies still pending. Remains NPO  6/27/23: Colonoscopy performed this AM. Suspected inflammatory bowel disease (Crohn's disease). Biopsy samples sent to pathology. Post-op complicated by suspected aspiration pneumonitis, distended abdomen, and difficulty with NG tube placement. IV solumedrol started after Cardiac workup negative.   6/28/23: Overnight significant for patient's rhythm changing to A-fib for several hours. Treated with Metoprolol IV. Chest X-ray showed developing infiltrates as expected following aspiration event. Supplemental oxygen required overnight. 500cc fluid bolus of NS given to  help with hypotension. Spontaneously converted back to sinus rhythm. This morning he is feeling better. Still NPO and on IV antibiotics. Solumedrol 20mg IV q12 started yesterday.   6/29/23: Patient has gone back into A-fib. Overall doing better. Continue NPO. Tolerating TPN. Potassium low again at 3.1, will replace and switch to TPN with potassium.   6/30/23: Doing well today. Potassium improved to 3.6. Abdomen less distended.   07/01/2023 pt awake and alert admitted with abdominal pain colonoscopy shows suspected Chron's disease, had some suspected aspiration at time of colonoscopy.  He is imroving and tolerated clear liquid diet x 2 days.  H/o Afib HR currently 100-110s,   07/02/2023 tolerating diet this am, BP on low side giving atenolol this am and will hold losartan pending his BP as day proceeds.Pt is asymptomatic  7/3/23: On cardiac diet. Doing well. Ready for discharge.       Physical Exam  Vitals reviewed.   Constitutional:       General: He is not in acute distress.     Appearance: Normal appearance. He is not ill-appearing.   Cardiovascular:      Rate and Rhythm: Normal rate and regular rhythm.      Pulses: Normal pulses.      Heart sounds: Normal heart sounds. No murmur heard.    No friction rub. No gallop.   Pulmonary:      Effort: No respiratory distress.      Breath sounds: No wheezing, rhonchi or rales.   Abdominal:      General: Bowel sounds are normoactive There is no distension.   Musculoskeletal:         General: No swelling.      Right lower leg: No edema.      Left lower leg: No edema.   Skin:     General: Skin is warm and dry.   Neurological:      General: No focal deficit present.      Mental Status: He is alert.   Psychiatric:         Mood and Affect: Mood normal.         Behavior: Behavior normal.        Goals of Care Treatment Preferences:  Code Status: Full Code      Consults:   Consults (From admission, onward)        Status Ordering Provider     Inpatient consult to Social Work/Case  Management  Once        Provider:  (Not yet assigned)    Ordered JAYCE ECHEVARRIA     Inpatient consult to Cardiology  Once        Provider:  Card[facility]    Acknowledged JAYCE ECHEVARRIA     Inpatient consult to Gastroenterology  Once        Provider:  Kenny Vargas MD    Acknowledged JAYCE ECHEVARRIA     Inpatient consult to General Surgery  Once        Provider:  Marybeth Mora MD    Acknowledged DOMIINCK HASTINGS          No new Assessment & Plan notes have been filed under this hospital service since the last note was generated.  Service: Hospital Medicine    Final Active Diagnoses:    Diagnosis Date Noted POA    Crohn's disease of both small and large intestine without complication [K50.80] 07/03/2023 Yes     Chronic    Paroxysmal atrial fibrillation [I48.0] 06/29/2023 No    Primary hypertension [I10] 02/16/2023 Yes     Chronic      Problems Resolved During this Admission:    Diagnosis Date Noted Date Resolved POA    PRINCIPAL PROBLEM:  Terminal ileitis without complication [K50.00] 06/26/2023 07/03/2023 Yes    Aspiration into respiratory tract [T17.908A] 06/28/2023 07/03/2023 No     Patient will be set up with Cardiology follow up for A-fib. Also will need follow up with GI for continued management of Crohn's disease. Will discharge on oral steroids.   Discharged Condition: good    Disposition:     Follow Up:   Follow-up Information     Jayce Echevarria DO. Go on 7/7/2023.    Specialty: Family Medicine  Why: @ 8:15 am follow up appt  Contact information:  [facility] [address] [id number] [phone]                       Patient Instructions:   No discharge procedures on file.    Significant Diagnostic Studies: N/A    Pending Diagnostic Studies:     Procedure Component Value Units Date/Time    Stool Exam-Ova,Cysts,Parasites [[id number]] Collected: 06/25/23 1745    Order Status: Sent Lab Status: In process Updated: 06/25/23 1746    Specimen:  Stool          Medications:  Reconciled Home Medications:      Medication List      START taking these medications    apixaban 5 mg Tab  Commonly known as: ELIQUIS  Take 1 tablet (5 mg total) by mouth 2 (two) times daily.     hydrocortisone 20 MG Tab  Commonly known as: CORTEF  Take 1 tablet (20 mg total) by mouth 2 (two) times daily. for 10 days        CONTINUE taking these medications    atenoloL 25 MG tablet  Commonly known as: TENORMIN  TAKE 1 TABLET BY MOUTH EVERY DAY     gabapentin 300 MG capsule  Commonly known as: NEURONTIN  Take 1 capsule (300 mg total) by mouth 3 (three) times daily.     losartan 50 MG tablet  Commonly known as: COZAAR  TAKE 1 TABLET BY MOUTH EVERY DAY            Indwelling Lines/Drains at time of discharge:   Lines/Drains/Airways     None                 Time spent on the discharge of patient: 35 minutes         Randy Evans DO  Department of Hospital Medicine  [facility]

## 2023-07-07 PROBLEM — I95.0 IDIOPATHIC HYPOTENSION: Status: ACTIVE | Noted: 2023-01-01

## 2023-07-07 NOTE — PLAN OF CARE
Administered 0.9% normal saline infusion as ordered.  Provided drinks for patient while in clinic.  Discussed with patient the options available to assist in staying hydrated.

## 2023-07-07 NOTE — PROGRESS NOTES
Transitional Care Note  Subjective:       Patient ID: Supa Carmen is a 83 y.o. male.  Chief Complaint: Transitional Care ([facility], [facility] Discharge f/u 7/3/23 - Crohn's, PAF, HTN)    Family and/or Caretaker present at visit?  Yes.  Diagnostic tests reviewed/disposition: No diagnosic tests pending after this hospitalization.  Disease/illness education: Crohn's disease   Home health/community services discussion/referrals: Not needed at time of discharge.   Establishment or re-establishment of referral orders for community resources: No other necessary community resources.   Discussion with other health care providers: No discussion with other health care providers necessary.   Patient is feeling better every day since discharge. Blood pressure is low in clinic this morning but his is asymptomatic. Questions about diet answered today. He has been seen by GI since discharge. Cardiology appointment on 7/19/23.     Review of Systems   Constitutional:  Negative for fatigue and fever.   Respiratory:  Negative for shortness of breath and wheezing.    Cardiovascular:  Negative for chest pain and palpitations.   Gastrointestinal:  Negative for abdominal distention, abdominal pain, constipation, diarrhea and nausea.     Objective:      Physical Exam  Vitals reviewed.   Constitutional:       General: He is not in acute distress.     Appearance: Normal appearance. He is not ill-appearing.   Cardiovascular:      Rate and Rhythm: Normal rate and regular rhythm.      Pulses: Normal pulses.      Heart sounds: Normal heart sounds. No murmur heard.    No friction rub. No gallop.   Pulmonary:      Effort: No respiratory distress.      Breath sounds: No wheezing, rhonchi or rales.   Musculoskeletal:         General: No swelling.      Right lower leg: No edema.      Left lower leg: No edema.   Skin:     General: Skin is warm and dry.   Neurological:      General: No focal deficit present.      Mental Status: He is alert.   Psychiatric:          Mood and Affect: Mood normal.         Behavior: Behavior normal.       Assessment:       1. Crohn's disease of both small and large intestine without complication    2. Idiopathic hypotension        Plan:       Continue current plan of care for Crohn's disease including GI recommendations and plan such as long term steroid course and low residue diet.     -Will order outpatient IV infusion of normal saline 1L for hypotension.

## 2023-07-07 NOTE — PROGRESS NOTES
12:05 Provided patient with AVS.  No questions, patient verbalized understanding of discharge instructions. Patient discharged in stable condition.

## 2023-07-10 NOTE — LETTER
AUTHORIZATION FOR RELEASE OF   CONFIDENTIAL INFORMATION    Dear Dr Vargas,    We are seeing Supa Carmen, date of birth 1940, in the clinic at [facility]. Randy Evans DO is the patient's PCP. Supa Carmen has an outstanding lab/procedure at the time we reviewed his chart. In order to help keep his health information updated, he has authorized us to request the following medical record(s):        (  )  MAMMOGRAM                                      (  )  COLONOSCOPY      (  )  PAP SMEAR                                          (  )  OUTSIDE LAB RESULTS     (  )  DEXA SCAN                                          (  )  EYE EXAM            (  )  FOOT EXAM                                          (  )  ENTIRE RECORD     (  )  OUTSIDE IMMUNIZATIONS                 (X)  LAST OFFICE NOTES         Please fax records to [facility], Quinn Quebodeaux, DO, [phone].              Patient Name: Supa Carmen  : 1940  Patient Phone #: [phone]

## 2023-07-10 NOTE — TELEPHONE ENCOUNTER
Spoke with Dr Vargas's office - Prednisone 20mg 1 daily #30 w/ 3 refills was sent on 7/6/23.     Notified Ms Argueta.

## 2023-07-10 NOTE — TELEPHONE ENCOUNTER
----- Message from Randy Evans DO sent at 7/10/2023  8:03 AM CDT -----  Regarding: FW: Return call  Can you please contact Dr. Vargas's office to clarify the dose of Prednisone and for how long he should be on it.     ----- Message -----  From: Roro Zuñiga  Sent: 7/7/2023   3:28 PM CDT  To: Nathan Padilla Staff  Subject: Return call                                      .Type:  Patient Returning Call    Who Called:Ellie (wife)  Who Left Message for Patient:Ellie  Does the patient know what this is regarding?:Trednisone  Would the patient rather a call back or a response via [facility]?   Best Call Back Number:[phone]  Additional Information: Need Trednisone sent in for [facility]. Patient wife said dr would send this in for them.

## 2023-07-13 NOTE — TELEPHONE ENCOUNTER
----- Message from Mariana Lai sent at 7/13/2023  3:31 PM CDT -----  Regarding: med  APIXBAN, Inés called mr Cowatr's daughter saying that [facility] told them this med is not on the market yet, so they wondering what Doc would like to do.  Please give Inés a call [phone] or Ellie  [phone] with what Doc's suggestions.  Also they are saying that Doc can help them out by calling Dr Vargas's office to get the PREDNISONE filled.           Thanks

## 2023-07-20 NOTE — TELEPHONE ENCOUNTER
----- Message from Mariana Lai sent at 7/20/2023 10:32 AM CDT -----  Inés Mr Cowart daughter called asking if Doc can Call or Text Dr Varags himself she still cannot get any answers from his office on what they need to to about mr Cowart still having diarrhea and losing weight.  Please call her back [phone]      Thanks

## 2023-07-27 NOTE — TELEPHONE ENCOUNTER
From what I have read in the medical literature, it can take several weeks for a Crohn's flare up to get under control. The diarrhea will likely persist for longer. Please confirm that he has an appointment with Cardiology. If not please enter referral. One should have been made before he was discharged.

## 2023-08-08 PROBLEM — E78.5 HLD (HYPERLIPIDEMIA): Status: ACTIVE | Noted: 2023-01-01

## 2023-08-08 PROBLEM — G62.0 CHEMOTHERAPY-INDUCED NEUROPATHY: Status: RESOLVED | Noted: 2023-01-01 | Resolved: 2023-01-01

## 2023-08-08 PROBLEM — G62.0 CHEMOTHERAPY-INDUCED NEUROPATHY: Status: ACTIVE | Noted: 2023-01-01

## 2023-08-08 PROBLEM — T45.1X5A CHEMOTHERAPY-INDUCED NEUROPATHY: Status: ACTIVE | Noted: 2023-01-01

## 2023-08-08 PROBLEM — K50.90 CROHN'S DISEASE, UNSPECIFIED, WITHOUT COMPLICATIONS: Status: ACTIVE | Noted: 2023-01-01

## 2023-08-08 PROBLEM — T45.1X5A CHEMOTHERAPY-INDUCED NEUROPATHY: Status: RESOLVED | Noted: 2023-01-01 | Resolved: 2023-01-01

## 2023-08-08 PROBLEM — M10.9 GOUT, UNSPECIFIED: Status: ACTIVE | Noted: 2023-01-01

## 2023-08-08 PROBLEM — Z87.891 FORMER TOBACCO USE: Status: ACTIVE | Noted: 2023-01-01

## 2023-08-08 PROBLEM — K57.90 DIVERTICULOSIS: Status: ACTIVE | Noted: 2023-01-01

## 2023-08-08 PROBLEM — M19.90 UNSPECIFIED OSTEOARTHRITIS, UNSPECIFIED SITE: Status: ACTIVE | Noted: 2023-01-01

## 2023-08-08 PROBLEM — I25.10 ARTERIOSCLEROSIS OF CORONARY ARTERY: Status: ACTIVE | Noted: 2023-01-01

## 2023-08-08 NOTE — PROGRESS NOTES
Patient ID: [id number]     Chief Complaint: Follow-up        HPI:     Supa Carmen is a 83 y.o. male here today for Follow-up for Atrial Fibrillation and Crohn's disease. Still having symptoms of diarrhea, currently on long course of steroids. Having trouble getting Eliquis approved by insurance.         ----------------------------  CAD (coronary artery disease)  Chemotherapy-induced neuropathy  Diverticulosis  DJD (degenerative joint disease)  Gout  History of colon cancer, stage III  HLD (hyperlipidemia)  HTN (hypertension)  Personal history of colonic polyps      Comment:  s/p polypectomy - Dr Kenny Vargas     Past Surgical History:   Procedure Laterality Date    CARDIAC CATHETERIZATION  10/14/2009    Dr Tim Bryan    COLONOSCOPY N/A 6/27/2023    Procedure: COLONOSCOPY;  Surgeon: Kenny Vargas MD;  Location: [facility];  Service: Gastroenterology;  Laterality: N/A;    COLONOSCOPY W/ BIOPSIES AND POLYPECTOMY  05/30/2012    3mm polyp - Dr Kenny Vargas    COLONOSCOPY W/ BIOPSIES AND POLYPECTOMY  10/24/2017    2 polyps - Dr Kenny Vargas    COLONOSCOPY W/ BIOPSIES AND POLYPECTOMY  07/22/2014    2 polyps - Dr Kenny Vargas    HEMICOLECTOMY, RIGHT, LAPAROSCOPIC  05/20/2011    Dr Elijah Huntley    LUMBAR DISCECTOMY      MEDIPORT INSERTION, SINGLE  06/16/2011    Dr Elijah Huntley       Review of patient's allergies indicates:  No Known Allergies    Outpatient Medications Marked as Taking for the 8/8/23 encounter (Office Visit) with Randy Evans, DO   Medication Sig Dispense Refill    atenoloL (TENORMIN) 25 MG tablet TAKE 1 TABLET BY MOUTH EVERY DAY 90 tablet 0    gabapentin (NEURONTIN) 300 MG capsule Take 1 capsule (300 mg total) by mouth 3 (three) times daily. 90 capsule 2    losartan (COZAAR) 50 MG tablet TAKE 1 TABLET BY MOUTH EVERY DAY 90 tablet 3    multivitamin (THERAGRAN) per tablet Take 1 tablet by mouth once daily.      predniSONE (DELTASONE) 20 MG tablet Take 1 tablet (20 mg  total) by mouth once daily. 30 tablet 5    [DISCONTINUED] apixaban (ELIQUIS) 5 mg Tab Take 1 tablet by mouth 2 (two) times a day.         Social History     Socioeconomic History    Marital status:    Tobacco Use    Smoking status: Former     Current packs/day: 0.00     Types: Cigarettes    Smokeless tobacco: Never   Substance and Sexual Activity    Alcohol use: Yes    Drug use: Never    Sexual activity: Not Currently     Social Determinants of Health     Financial Resource Strain: Low Risk  (6/26/2023)    Overall Financial Resource Strain (CARDIA)     Difficulty of Paying Living Expenses: Not hard at all   Food Insecurity: No Food Insecurity (6/26/2023)    Hunger Vital Sign     Worried About Running Out of Food in the Last Year: Never true     Ran Out of Food in the Last Year: Never true   Transportation Needs: No Transportation Needs (6/26/2023)    PRAPARE - Transportation     Lack of Transportation (Medical): No     Lack of Transportation (Non-Medical): No   Physical Activity: Insufficiently Active (6/26/2023)    Exercise Vital Sign     Days of Exercise per Week: 4 days     Minutes of Exercise per Session: 20 min   Stress: No Stress Concern Present (6/26/2023)    [employer] [place] [employer] - Occupational Stress Questionnaire     Feeling of Stress : Not at all   Social Connections: Socially Integrated (6/26/2023)    Social Connection and Isolation Panel [NHANES]     Frequency of Communication with Friends and Family: Three times a week     Frequency of Social Gatherings with Friends and Family: Three times a week     Attends Confucianism Services: More than 4 times per year     Active Member of Clubs or Organizations: Yes     Attends Club or Organization Meetings: 1 to 4 times per year     Marital Status:    Housing Stability: Low Risk  (6/26/2023)    Housing Stability Vital Sign     Unable to Pay for Housing in the Last Year: No     Number of Places Lived in the Last Year: 1     Unstable  "Housing in the Last Year: No        Family History   Problem Relation Age of Onset    No Known Problems Mother     Other Father 42        intestinal rupture - Cause of death        Patient Care Team:  Randy Evans DO as PCP - General (Family Medicine)  Kenny Vargas MD as Consulting Physician (Gastroenterology)  Elijah Huntley MD as Consulting Physician (Colon and Rectal Surgery)  Kristal Huston MD as Consulting Physician (Interventional Cardiology)  Lyle Marroquin MD as Consulting Physician (Cardiology)  Andrew Gilmore MD as Consulting Physician (Gastroenterology)     Subjective:     Review of Systems   Constitutional:  Positive for malaise/fatigue. Negative for chills and fever.   Respiratory:  Negative for shortness of breath.    Cardiovascular:  Negative for chest pain.   Gastrointestinal:  Positive for diarrhea. Negative for constipation.   Neurological:  Negative for headaches.   Psychiatric/Behavioral:  The patient does not have insomnia.        See HPI for details  All Other ROS: Negative except as stated in HPI.       Objective:     /70   Pulse 82   Temp 98.2 °F (36.8 °C) (Tympanic)   Resp 18   Ht 5' 4.96" (1.65 m)   Wt 70.3 kg (155 lb)   SpO2 98%   BMI 25.82 kg/m²     Physical Exam  Vitals reviewed.   Constitutional:       General: He is not in acute distress.     Appearance: Normal appearance. He is not ill-appearing.   Cardiovascular:      Rate and Rhythm: Normal rate and regular rhythm.      Pulses: Normal pulses.      Heart sounds: Normal heart sounds. No murmur heard.     No friction rub. No gallop.   Pulmonary:      Effort: No respiratory distress.      Breath sounds: No wheezing, rhonchi or rales.   Musculoskeletal:         General: No swelling.      Right lower leg: No edema.      Left lower leg: No edema.   Skin:     General: Skin is warm and dry.   Neurological:      General: No focal deficit present.      Mental Status: He is alert.   Psychiatric:         " Mood and Affect: Mood normal.         Behavior: Behavior normal.         Assessment/Plan:     1. Paroxysmal atrial fibrillation  -     apixaban (ELIQUIS) 5 mg Tab; Take 1 tablet (5 mg total) by mouth 2 (two) times a day.  Dispense: 180 tablet; Refill: 1  Will try mail order pharmacy to see if medication is covered/more affordable.   2. Crohn's disease of both small and large intestine without complication  Patient has upcoming appointment with GI later in the month, will see if they keep him on the steroids or change treatment plan.         Will follow up with patient in September to see what the treatment plan is for his Crohn's disease and A-fib and what his long term goals of care are.     Follow up:     Follow up in about 4 weeks (around 9/5/2023). In addition to their scheduled follow up, the patient has also been instructed to follow up on as needed basis.

## 2023-08-11 NOTE — TELEPHONE ENCOUNTER
----- Message from Mariana Lai sent at 8/11/2023  8:28 AM CDT -----  Regarding: call back  [place] please call [place] its concerning Mr Giancarlo roas saying hes short of breath, weakness, agitation,and still having diarrhea.  [phone]      Thanks

## 2023-08-11 NOTE — TELEPHONE ENCOUNTER
After his last appt, patient could hardly walk to Dr Marroquin's office and was severely SOB and weak. According to pt daughter, Dr Marroquin did not even examine him, just sat down asked questions said he was referring him to [facility] and left. Did not explain anything to them.    In[facility] said they don't know if its due to a reaction to the prednisone or not. She does not know when symptoms started, they are not all the time. It's very hard for me to get any actual answers or information from In[facility] or pt/spouse. ED precautions given. Told her to contact Dr Gilmore's office regarding his diarrhea.

## 2023-09-14 PROBLEM — I48.91 A-FIB: Status: ACTIVE | Noted: 2023-01-01

## 2023-09-14 NOTE — DISCHARGE SUMMARY
[facility] - Cardiology Diagnostics  Discharge Note  Short Stay    Cardioversion      OUTCOME: Patient tolerated treatment/procedure well without complication and is now ready for discharge.    DISPOSITION: Home or Self Care    FINAL DIAGNOSIS:  A-fib    FOLLOWUP: In clinic    DISCHARGE INSTRUCTIONS:  No discharge procedures on file.     TIME SPENT ON DISCHARGE: 15 minutes

## 2023-09-14 NOTE — ANESTHESIA PREPROCEDURE EVALUATION
09/14/2023  Supa Carmen is a 83 y.o., male with h/o cad, htn, h/o colon ca, crohn's and other medical problems listed in the EMR    EF 59%    Pre-op Assessment    I have reviewed the Patient Summary Reports.     I have reviewed the Nursing Notes. I have reviewed the NPO Status.   I have reviewed the Medications.     Review of Systems  Cardiovascular:   Exercise tolerance: good    Pulmonary:   body habitus suspicious for sleep apnea, but pt denies sleep apnea       Physical Exam  General: Well nourished and Cooperative    Airway:  Mallampati: III   Mouth Opening: Normal  TM Distance: Normal  Tongue: Normal  Neck ROM: Normal ROM    Dental:  Intact, Periodontal disease    Chest/Lungs:  Clear to auscultation    Heart:  Rhythm: Regular Rhythm        Anesthesia Plan  Type of Anesthesia, risks & benefits discussed:    Anesthesia Type: Gen Natural Airway  Intra-op Monitoring Plan: Standard ASA Monitors  Induction:  IV  Informed Consent: Informed consent signed with the Patient and all parties understand the risks and agree with anesthesia plan.  All questions answered.   ASA Score: 3  Day of Surgery Review of History & Physical: H&P Update referred to the surgeon/provider.    Ready For Surgery From Anesthesia Perspective.     .  I explained anesthesia plan to patient/responsbile party if available.  Anesthesia consent done going over the material facts, risks, complications & alternatives, obtained which includes the possibility of altering the anesthesia plan.  I reviewed problem list, appropriate labs, any workup, Xray, EKG etc noted below.  Patients condition is satisfactory to proceed with anesthesia plan unless otherwise noted (see anesthesia chart for details of the anesthesia plan carried out).      Pre-operative evaluation for * No procedures listed *    /72   Pulse 82   Temp 36.6 °C (97.9 °F)  "(Oral)   Ht 5' 5" (1.651 m)   Wt 71.5 kg (157 lb 10.1 oz)   SpO2 98%   BMI 26.23 kg/m²     Patient Active Problem List   Diagnosis    History of colon cancer    Primary hypertension    Establishing care with new doctor, encounter for    Neuropathy    Paroxysmal atrial fibrillation    Crohn's disease of both small and large intestine without complication    Idiopathic hypotension    Arteriosclerosis of coronary artery    Crohn's disease, unspecified, without complications    Diverticulosis    Former tobacco use    Gout, unspecified    H/O cardiac catheterization    HLD (hyperlipidemia)    Unspecified osteoarthritis, unspecified site       Review of patient's allergies indicates:  No Known Allergies    Current Outpatient Medications   Medication Instructions    apixaban (ELIQUIS) 5 mg, Oral, 2 times daily    gabapentin (NEURONTIN) 300 mg, Oral, 3 times daily    losartan (COZAAR) 50 MG tablet TAKE 1 TABLET BY MOUTH EVERY DAY    multivitamin (THERAGRAN) per tablet 1 tablet, Oral, Daily    predniSONE (DELTASONE) 20 mg, Oral, Daily    sotaloL (BETAPACE) 80 mg, Oral, 2 times daily       Past Surgical History:   Procedure Laterality Date    CARDIAC CATHETERIZATION  10/14/2009    Dr Tim Bryan    COLONOSCOPY N/A 6/27/2023    Procedure: COLONOSCOPY;  Surgeon: Kenny Vargas MD;  Location: [facility];  Service: Gastroenterology;  Laterality: N/A;    COLONOSCOPY W/ BIOPSIES AND POLYPECTOMY  05/30/2012    3mm polyp - Dr Kenny Vargas    COLONOSCOPY W/ BIOPSIES AND POLYPECTOMY  10/24/2017    2 polyps - Dr Kenny Vargas    COLONOSCOPY W/ BIOPSIES AND POLYPECTOMY  07/22/2014    2 polyps - Dr Kenny Vargas    HEMICOLECTOMY, RIGHT, LAPAROSCOPIC  05/20/2011    Dr Elijah Huntley    LUMBAR DISCECTOMY      MEDIPORT INSERTION, SINGLE  06/16/2011    Dr Elijah Huntley       Social History     Socioeconomic History    Marital status:    Tobacco Use    Smoking status: Former     " Types: Cigarettes    Smokeless tobacco: Never   Substance and Sexual Activity    Alcohol use: Not Currently    Drug use: Never    Sexual activity: Not Currently     Social Determinants of Health     Financial Resource Strain: Low Risk  (6/26/2023)    Overall Financial Resource Strain (CARDIA)     Difficulty of Paying Living Expenses: Not hard at all   Food Insecurity: No Food Insecurity (6/26/2023)    Hunger Vital Sign     Worried About Running Out of Food in the Last Year: Never true     Ran Out of Food in the Last Year: Never true   Transportation Needs: No Transportation Needs (6/26/2023)    PRAPARE - Transportation     Lack of Transportation (Medical): No     Lack of Transportation (Non-Medical): No   Physical Activity: Insufficiently Active (6/26/2023)    Exercise Vital Sign     Days of Exercise per Week: 4 days     Minutes of Exercise per Session: 20 min   Stress: No Stress Concern Present (6/26/2023)    [employer] - Occupational Stress Questionnaire     Feeling of Stress : Not at all   Social Connections: Socially Integrated (6/26/2023)    Social Connection and Isolation Panel [NHANES]     Frequency of Communication with Friends and Family: Three times a week     Frequency of Social Gatherings with Friends and Family: Three times a week     Attends Moravian Services: More than 4 times per year     Active Member of Clubs or Organizations: Yes     Attends Club or Organization Meetings: 1 to 4 times per year     Marital Status:    Housing Stability: Low Risk  (6/26/2023)    Housing Stability Vital Sign     Unable to Pay for Housing in the Last Year: No     Number of Places Lived in the Last Year: 1     Unstable Housing in the Last Year: No       Lab Results   Component Value Date    WBC 12.78 (H) 09/07/2023    HGB 13.2 (L) 09/07/2023    HCT 41.8 (L) 09/07/2023    MCV 88.9 09/07/2023     09/07/2023          BMP  Lab Results   Component Value Date  "   HCT 41.8 (L) 09/07/2023     09/07/2023    K 4.1 09/07/2023    BUN 18.0 09/07/2023    CREATININE 1.22 (H) 09/07/2023    CALCIUM 7.9 (L) 09/07/2023        INR  No results for input(s): "PT", "INR", "PROTIME", "APTT" in the last 72 hours.        Diagnostic Studies:      EKG:  Results for orders placed or performed in visit on 08/30/23   EKG 12-lead    Collection Time: 08/30/23  8:20 AM    Narrative    Test Reason : I10,R06.02,I25.10,    Vent. Rate : 096 BPM     Atrial Rate : 107 BPM     P-R Int : 000 ms          QRS Dur : 072 ms      QT Int : 328 ms       P-R-T Axes : 000 047 087 degrees     QTc Int : 414 ms    Undetermined rhythm  P wave is not clearly seen  Rhythm appears irregular   Probably Atrial fibrillation with PVCs  Clinical Correlation Required    Abnormal ECG  Confirmed by Malachi Barr MD ([id number]) on 8/30/2023 5:40:07 PM    Referred By: HARLEY MEDINA           Confirmed By:Malachi Barr MD            "

## 2023-09-14 NOTE — ANESTHESIA POSTPROCEDURE EVALUATION
Anesthesia Post Evaluation    Patient: Supa Carmen    Procedure(s) Performed: * No procedures listed *    Final Anesthesia Type: general (/Regional//MAC)      Patient location during evaluation: PACU  Post-procedure mental status: @ basline.  Pain management: adequate    PONV status: See postop meds for drugs used to control n/v if any.  Anesthetic complications: no      Cardiovascular status: blood pressure returned to baseline  Respiratory status: @ baseline.  Hydration status: euvolemic            Vitals Value Taken Time   BP 87/54 09/14/23 0801   Temp 98 09/14/23 0803   Pulse 78 09/14/23 0802   Resp 14 09/14/23 0802   SpO2 97 % 09/14/23 0802   Vitals shown include unvalidated device data.      No case tracking events are documented in the log.      Pain/Freedom Score: No data recorded

## 2023-09-14 NOTE — TRANSFER OF CARE
"Anesthesia Transfer of Care Note    Patient: Supa Carmen    Procedure(s) Performed: * No procedures listed *    Patient location: PACU    Anesthesia Type: MAC    Transport from OR: Transported from OR on room air with adequate spontaneous ventilation    Post pain: adequate analgesia    Post assessment: no apparent anesthetic complications    Post vital signs: stable    Level of consciousness: awake    Nausea/Vomiting: no nausea/vomiting    Complications: none    Transfer of care protocol was followed      Last vitals:   Visit Vitals  /72   Pulse 82   Temp 36.6 °C (97.9 °F) (Oral)   Ht 5' 5" (1.651 m)   Wt 71.5 kg (157 lb 10.1 oz)   SpO2 98%   BMI 26.23 kg/m²     "

## 2023-09-20 NOTE — PROGRESS NOTES
Patient ID: [id number]     Chief Complaint: Follow-up        HPI:     Supa Carmen is a 83 y.o. male here today for Follow-up for Crohn's disease and Atrial Fibrillation. Recent Cardioversion on 9/14/23 that initially worked and patient was back in sinus rhythm but he converted back into A-fib in recovery. Dyspnea did initially improve while he was in sinus rhythm but symptoms returned when he went back into A-fib. Diarrhea persists but frequency has improved somewhat. Recently prescribed cholestyramine powder by GI.       ----------------------------  CAD (coronary artery disease)  Chemotherapy-induced neuropathy  Diverticulosis  DJD (degenerative joint disease)  Gout  History of colon cancer, stage III  HLD (hyperlipidemia)  HTN (hypertension)  Personal history of colonic polyps      Comment:  s/p polypectomy - Dr Kenny Vargas     Past Surgical History:   Procedure Laterality Date    CARDIAC CATHETERIZATION  10/14/2009    Dr Tim Bryan    COLONOSCOPY N/A 6/27/2023    Procedure: COLONOSCOPY;  Surgeon: Kenny Vargas MD;  Location: [facility];  Service: Gastroenterology;  Laterality: N/A;    COLONOSCOPY W/ BIOPSIES AND POLYPECTOMY  05/30/2012    3mm polyp - Dr Kenny Vargas    COLONOSCOPY W/ BIOPSIES AND POLYPECTOMY  10/24/2017    2 polyps - Dr Kenny Vargas    COLONOSCOPY W/ BIOPSIES AND POLYPECTOMY  07/22/2014    2 polyps - Dr Kenny Vargas    HEMICOLECTOMY, RIGHT, LAPAROSCOPIC  05/20/2011    Dr Elijah Huntley    LUMBAR DISCECTOMY      MEDIPORT INSERTION, SINGLE  06/16/2011    Dr Elijah Huntley       Review of patient's allergies indicates:  No Known Allergies    Outpatient Medications Marked as Taking for the 9/20/23 encounter (Office Visit) with Randy Evans,    Medication Sig Dispense Refill    apixaban (ELIQUIS) 5 mg Tab Take 1 tablet (5 mg total) by mouth 2 (two) times a day. 180 tablet 1    cholestyramine-aspartame (QUESTRAN LIGHT) 4 gram PwPk Take 1 packet by mouth 2 (two)  times a day.      gabapentin (NEURONTIN) 300 MG capsule Take 1 capsule (300 mg total) by mouth 3 (three) times daily. (Patient taking differently: Take 300 mg by mouth daily as needed.) 90 capsule 2    losartan (COZAAR) 50 MG tablet TAKE 1 TABLET BY MOUTH EVERY DAY (Patient taking differently: Take 25 mg by mouth once daily.) 90 tablet 3    multivitamin (THERAGRAN) per tablet Take 1 tablet by mouth once daily.      predniSONE (DELTASONE) 20 MG tablet Take 1 tablet (20 mg total) by mouth once daily. (Patient taking differently: Take 10 mg by mouth once daily.) 30 tablet 5    sotaloL (BETAPACE) 80 MG tablet Take 1.5 tablets (120 mg total) by mouth 2 (two) times daily. 90 tablet 11       Social History     Socioeconomic History    Marital status:    Tobacco Use    Smoking status: Former     Types: Cigarettes    Smokeless tobacco: Never   Substance and Sexual Activity    Alcohol use: Not Currently    Drug use: Never    Sexual activity: Not Currently     Social Determinants of Health     Financial Resource Strain: Low Risk  (6/26/2023)    Overall Financial Resource Strain (CARDIA)     Difficulty of Paying Living Expenses: Not hard at all   Food Insecurity: No Food Insecurity (6/26/2023)    Hunger Vital Sign     Worried About Running Out of Food in the Last Year: Never true     Ran Out of Food in the Last Year: Never true   Transportation Needs: No Transportation Needs (6/26/2023)    PRAPARE - Transportation     Lack of Transportation (Medical): No     Lack of Transportation (Non-Medical): No   Physical Activity: Insufficiently Active (6/26/2023)    Exercise Vital Sign     Days of Exercise per Week: 4 days     Minutes of Exercise per Session: 20 min   Stress: No Stress Concern Present (6/26/2023)    [employer] - Occupational Stress Questionnaire     Feeling of Stress : Not at all   Social Connections: Socially Integrated (6/26/2023)    Social Connection and Isolation Panel [NHANES]      "Frequency of Communication with Friends and Family: Three times a week     Frequency of Social Gatherings with Friends and Family: Three times a week     Attends [employer] Services: More than 4 times per year     Active Member of Clubs or Organizations: Yes     Attends Club or Organization Meetings: 1 to 4 times per year     Marital Status:    Housing Stability: Low Risk  (6/26/2023)    Housing Stability Vital Sign     Unable to Pay for Housing in the Last Year: No     Number of Places Lived in the Last Year: 1     Unstable Housing in the Last Year: No        Family History   Problem Relation Age of Onset    No Known Problems Mother     Other Father 42        intestinal rupture - Cause of death        Patient Care Team:  Randy Evans DO as PCP - General (Family Medicine)  Kenny Vargas MD as Consulting Physician (Gastroenterology)  Elijah Huntley MD as Consulting Physician (Colon and Rectal Surgery)  Kristal Huston MD as Consulting Physician (Interventional Cardiology)  Lyle Marroquin MD as Consulting Physician (Cardiology)  Andrew Gilmore MD as Consulting Physician (Gastroenterology)  Andrew Shaikh MD as Consulting Physician (Cardiology)     Subjective:     Review of Systems   Constitutional:  Negative for chills and fever.   Respiratory:  Positive for shortness of breath.    Cardiovascular:  Positive for palpitations. Negative for chest pain.   Gastrointestinal:  Negative for constipation and diarrhea.   Neurological:  Negative for dizziness and headaches.   Psychiatric/Behavioral:  The patient does not have insomnia.        See HPI for details  All Other ROS: Negative except as stated in HPI.       Objective:     BP 98/62   Pulse 86   Temp 97 °F (36.1 °C) (Tympanic)   Resp 16   Ht 5' 4.96" (1.65 m)   Wt 72.1 kg (159 lb)   SpO2 98%   BMI 26.49 kg/m²     Physical Exam  Vitals reviewed.   Constitutional:       General: He is not in acute distress.     Appearance: Normal " appearance. He is not ill-appearing.   Cardiovascular:      Rate and Rhythm: Normal rate. Rhythm irregular.      Pulses: Normal pulses.      Heart sounds: Normal heart sounds. No murmur heard.     No friction rub. No gallop.   Pulmonary:      Effort: No respiratory distress.      Breath sounds: No wheezing, rhonchi or rales.   Musculoskeletal:         General: No swelling.      Right lower leg: No edema.      Left lower leg: No edema.   Skin:     General: Skin is warm and dry.   Neurological:      General: No focal deficit present.      Mental Status: He is alert.   Psychiatric:         Mood and Affect: Mood normal.         Behavior: Behavior normal.       Assessment/Plan:     1. Crohn's disease of both small and large intestine without complication  Recent started on Cholestyramine-aspartame. GI beginning to taper off steroids.   2. Chronic atrial fibrillation  Recent failed cardioversion. Started on Sotalol 120mg BID. Blood pressure low but not critically low, will continue to monitor.     Recommended patient try holding the losartan until his cardiology appointment on 9/22/23 to help with his lower blood pressures and subsequent symptoms.   Follow up:     Follow up in about 3 months (around 12/20/2023) for Medicare Wellness. In addition to their scheduled follow up, the patient has also been instructed to follow up on as needed basis.

## 2023-09-26 NOTE — ANESTHESIA PREPROCEDURE EVALUATION
09/26/2023  Supa Carmen is a 83 y.o., male.    Other Medical History   HTN (hypertension) Chemotherapy-induced neuropathy   HLD (hyperlipidemia) History of colon cancer, stage III   Gout Personal history of colonic polyps   DJD (degenerative joint disease) CAD (coronary artery disease)   Diverticulosis      Surgical History    CARDIAC CATHETERIZATION LUMBAR DISCECTOMY   HEMICOLECTOMY, RIGHT, LAPAROSCOPIC COLONOSCOPY W/ BIOPSIES AND POLYPECTOMY   MEDIPORT INSERTION, SINGLE COLONOSCOPY W/ BIOPSIES AND POLYPECTOMY   COLONOSCOPY W/ BIOPSIES AND POLYPECTOMY COLONOSCOPY       Pre-op Assessment    I have reviewed the Patient Summary Reports.     I have reviewed the Nursing Notes. I have reviewed the NPO Status.   I have reviewed the Medications.     Review of Systems  Anesthesia Hx:  No problems with previous Anesthesia    Social:  Non-Smoker    Cardiovascular:   Hypertension CAD  Dysrhythmias atrial fibrillation ECG has been reviewed.    Musculoskeletal:   Arthritis     Neurological:   Neuromuscular Disease,        Physical Exam  General: Well nourished, Cooperative, Alert and Oriented    Airway:  Mallampati: IV   Mouth Opening: Normal  TM Distance: Normal  Tongue: Normal  Neck ROM: Normal ROM    Dental:  Intact    Chest/Lungs:  Clear to auscultation, Normal Respiratory Rate    Heart:  Rate: Normal  Rhythm: Regular Rhythm  Sounds: Normal    Abdomen:  Normal, Soft, Nontender        Anesthesia Plan  Type of Anesthesia, risks & benefits discussed:    Anesthesia Type: MAC  Intra-op Monitoring Plan: Standard ASA Monitors  Post Op Pain Control Plan: multimodal analgesia  Induction:  IV  Airway Plan: Direct  Informed Consent: Informed consent signed with the Patient and all parties understand the risks and agree with anesthesia plan.  All questions answered.   ASA Score: 3  Day of Surgery Review of History &  Physical: H&P Update referred to the surgeon/provider.    Ready For Surgery From Anesthesia Perspective.     .

## 2023-09-26 NOTE — ANESTHESIA POSTPROCEDURE EVALUATION
Anesthesia Post Evaluation    Patient: Supa Carmen    Procedure(s) Performed: * No procedures listed *    Final Anesthesia Type: general      Patient location during evaluation: PACU  Patient participation: Yes- Able to Participate  Level of consciousness: awake and alert  Post-procedure vital signs: reviewed and stable  Pain management: adequate  Airway patency: patent  KENNETH mitigation strategies: Multimodal analgesia  PONV status at discharge: No PONV  Anesthetic complications: no      Cardiovascular status: hemodynamically stable  Respiratory status: unassisted  Hydration status: euvolemic  Follow-up not needed.          Vitals Value Taken Time   BP 96/58 09/26/23 0902   Temp 36.8 09/26/23 0938   Pulse 68 09/26/23 0902   Resp 19 09/26/23 0901   SpO2 97 % 09/26/23 0902   Vitals shown include unvalidated device data.      No case tracking events are documented in the log.      Pain/Freedom Score: No data recorded

## 2023-09-26 NOTE — TRANSFER OF CARE
"Anesthesia Transfer of Care Note    Patient: Supa Carmen    Procedure(s) Performed: * No procedures listed *    Patient location: PACU    Anesthesia Type: MAC    Transport from OR: Transported from OR on room air with adequate spontaneous ventilation    Post pain: adequate analgesia    Post assessment: no apparent anesthetic complications and tolerated procedure well    Post vital signs: stable    Level of consciousness: sedated    Nausea/Vomiting: no nausea/vomiting    Complications: none    Transfer of care protocol was followed      Last vitals:   Visit Vitals  /74   Pulse 96   Temp 36.4 °C (97.6 °F) (Oral)   Ht 5' 5" (1.651 m)   Wt 71.2 kg (156 lb 15.5 oz)   SpO2 97%   BMI 26.12 kg/m²     "

## 2023-10-03 NOTE — ADDENDUM NOTE
Addendum  created 10/03/23 1146 by Dereck Alvares DO    Attestation recorded in Intraprocedure, Intraprocedure Attestations filed, Intraprocedure Event edited

## 2023-12-13 NOTE — TELEPHONE ENCOUNTER
----- Message from Mariana Lai sent at 12/13/2023  2:15 PM CST -----  Regarding: call back  [place] can you please give salomón a call its about Mr Cowart she said his legs and feet are swollen and under his feet or red [phone]      Thanks

## 2023-12-14 NOTE — ED NOTES
Patient assisted to ER room 3 via wheelchair from private auto.  Call from Dr. Marin nurse who stated they were sending over a patient that they thought had beta blocker overdose and low blood pressure.  Upon arrival blood pressure was 106/63.  Pt states the only pain he is currently having is in his b/l feet.  Edema +4 b/l lower extremities.  B/L Pedal pulse faint.  B/L radial pulse also faint.  Skin is cold upon touch.  Skin tear to right forearm.  Pt states that he doesn't know anything about him medications and to ask his wife.  Patient is AAOx4, however cold to the touch.

## 2023-12-14 NOTE — PROGRESS NOTES
Patient ID: [id number]     Chief Complaint: Weakness, Fatigue, Leg Swelling (BLE edema with redness of both feet), Shortness of Breath, Chills, and Anorexia (Decreased appetite)    HPI:     Supa Carmen is a 83 y.o. male here today for Weakness, Fatigue, Leg Swelling (BLE edema with redness of both feet), Shortness of Breath, Chills, and Anorexia (Decreased appetite). Symptoms started 2 weeks ago but patient reports the symptoms do not occur every day.     ----------------------------  Atrial fibrillation  CAD (coronary artery disease)  Chemotherapy-induced neuropathy  Diverticulosis  DJD (degenerative joint disease)  Gout  History of colon cancer, stage III  HLD (hyperlipidemia)  HTN (hypertension)  Personal history of colonic polyps      Comment:  s/p polypectomy - Dr Kenny Vargas     Past Surgical History:   Procedure Laterality Date    APPENDECTOMY      CARDIAC CATHETERIZATION  10/14/2009    Dr Tim Bryan    COLONOSCOPY N/A 06/27/2023    Procedure: COLONOSCOPY;  Surgeon: Kenny Vargas MD;  Location: [facility];  Service: Gastroenterology;  Laterality: N/A;    COLONOSCOPY W/ BIOPSIES AND POLYPECTOMY  05/30/2012    3mm polyp - Dr Kenny Vargas    COLONOSCOPY W/ BIOPSIES AND POLYPECTOMY  10/24/2017    2 polyps - Dr Kenny Vargas    COLONOSCOPY W/ BIOPSIES AND POLYPECTOMY  07/22/2014    2 polyps - Dr Kenny Vargas    HEMICOLECTOMY, RIGHT, LAPAROSCOPIC  05/20/2011    Dr Elijah Huntley    LUMBAR DISCECTOMY      MEDIPORT INSERTION, SINGLE  06/16/2011    Dr Elijah Huntley       Review of patient's allergies indicates:  No Known Allergies    Outpatient Medications Marked as Taking for the 12/14/23 encounter (Office Visit) with Randy Evans,    Medication Sig Dispense Refill    apixaban (ELIQUIS) 5 mg Tab Take 1 tablet (5 mg total) by mouth 2 (two) times a day. 180 tablet 1    gabapentin (NEURONTIN) 300 MG capsule Take 1 capsule (300 mg total) by mouth 3 (three) times daily. (Patient  taking differently: Take 300 mg by mouth daily as needed.) 90 capsule 2    multivitamin (THERAGRAN) per tablet Take 1 tablet by mouth once daily.      sotaloL (BETAPACE) 120 MG Tab Take 120 mg by mouth 2 (two) times daily.         Social History     Socioeconomic History    Marital status:    Tobacco Use    Smoking status: Former     Types: Cigarettes    Smokeless tobacco: Never   Substance and Sexual Activity    Alcohol use: Not Currently    Drug use: Never    Sexual activity: Not Currently     Social Determinants of Health     Financial Resource Strain: Low Risk  (6/26/2023)    Overall Financial Resource Strain (CARDIA)     Difficulty of Paying Living Expenses: Not hard at all   Food Insecurity: No Food Insecurity (6/26/2023)    Hunger Vital Sign     Worried About Running Out of Food in the Last Year: Never true     Ran Out of Food in the Last Year: Never true   Transportation Needs: No Transportation Needs (6/26/2023)    PRAPARE - Transportation     Lack of Transportation (Medical): No     Lack of Transportation (Non-Medical): No   Physical Activity: Insufficiently Active (6/26/2023)    Exercise Vital Sign     Days of Exercise per Week: 4 days     Minutes of Exercise per Session: 20 min   Stress: No Stress Concern Present (6/26/2023)    [employer] [employer] - Occupational Stress Questionnaire     Feeling of Stress : Not at all   Social Connections: Socially Integrated (6/26/2023)    Social Connection and Isolation Panel [NHANES]     Frequency of Communication with Friends and Family: Three times a week     Frequency of Social Gatherings with Friends and Family: Three times a week     Attends Islam Services: More than 4 times per year     Active Member of Clubs or Organizations: Yes     Attends Club or Organization Meetings: 1 to 4 times per year     Marital Status:    Housing Stability: Low Risk  (6/26/2023)    Housing Stability Vital Sign     Unable to Pay for Housing in the  "Last Year: No     Number of Places Lived in the Last Year: 1     Unstable Housing in the Last Year: No        Family History   Problem Relation Age of Onset    No Known Problems Mother     Other Father 42        intestinal rupture - Cause of death        Patient Care Team:  Randy Evans DO as PCP - General (Family Medicine)  Kenny Vargas MD as Consulting Physician (Gastroenterology)  Elijah Huntley MD as Consulting Physician (Colon and Rectal Surgery)  Kristal Huston MD as Consulting Physician (Interventional Cardiology)  Lyle Marroquin MD as Consulting Physician (Cardiology)  Andrew Gilmore MD as Consulting Physician (Gastroenterology)  Andrew Shaikh MD as Consulting Physician (Cardiology)     Subjective:     Review of Systems   Constitutional:  Positive for chills and malaise/fatigue. Negative for fever.   Respiratory:  Positive for shortness of breath.    Cardiovascular:  Positive for leg swelling. Negative for chest pain.   Gastrointestinal:  Negative for constipation and diarrhea.        Poor appetite   Neurological:  Positive for weakness. Negative for dizziness and headaches.   Psychiatric/Behavioral:  The patient does not have insomnia.      See HPI for details  All Other ROS: Negative except as stated in HPI.     Objective:     BP 97/65 (BP Location: Right arm)   Pulse (!) 59   Temp 96.1 °F (35.6 °C) (Temporal)   Resp 16   Ht 5' 4.96" (1.65 m)   Wt 71.2 kg (157 lb)   BMI 26.16 kg/m²     Physical Exam  Vitals reviewed.   Constitutional:       General: He is not in acute distress.     Appearance: Normal appearance. He is ill-appearing and toxic-appearing.   Cardiovascular:      Rate and Rhythm: Normal rate and regular rhythm.      Pulses: Normal pulses.      Heart sounds: Normal heart sounds. No murmur heard.     No friction rub. No gallop.   Pulmonary:      Effort: No respiratory distress.      Breath sounds: No wheezing, rhonchi or rales.   Musculoskeletal:         General: " No swelling.      Right lower leg: No edema.      Left lower leg: No edema.   Skin:     General: Skin is warm and dry.   Neurological:      General: No focal deficit present.      Mental Status: He is alert.   Psychiatric:         Mood and Affect: Mood normal.         Behavior: Behavior normal.         Assessment/Plan:     1. Idiopathic hypotension    2. Bilateral edema of lower extremity    3. Dyspnea, unspecified type    Will send to ED for further evaluation for suspected heart failure exacerbation vs symptomatic anemia or beta blocker overdose.       Follow up:     Follow up if symptoms worsen or fail to improve. In addition to their scheduled follow up, the patient has also been instructed to follow up on as needed basis.

## 2023-12-14 NOTE — ED PROVIDER NOTES
Encounter Date: 12/14/2023       History     Chief Complaint   Patient presents with    Weakness     At Dr Padilla s office, possible beta blocker overdose, weakness,     At Dr Padilla s office, possible beta blocker overdose, weakness.    The history is provided by the patient.     Review of patient's allergies indicates:  No Known Allergies  Past Medical History:   Diagnosis Date    Anasarca     Atrial fibrillation     Bradycardia     CAD (coronary artery disease)     Chemotherapy-induced neuropathy     Crohn's disease of both small and large intestine     Diverticulosis     DJD (degenerative joint disease)     Former tobacco use     Gout     H/O cardiac catheterization     History of colon cancer, stage III 05/20/2011    HLD (hyperlipidemia)     HTN (hypertension)     Hypocalcemia     Hypokalemia     Hypotension     Neuropathy     Osteoarthritis     Personal history of colonic polyps 10/24/2017    s/p polypectomy - Dr Kenny Vargas    Physical deconditioning     Severe protein-calorie malnutrition     Urinary retention     Weakness      Past Surgical History:   Procedure Laterality Date    APPENDECTOMY      CARDIAC CATHETERIZATION  10/14/2009    Dr Tim Bryan    COLONOSCOPY N/A 06/27/2023    Procedure: COLONOSCOPY;  Surgeon: Kenny Vargas MD;  Location: [facility];  Service: Gastroenterology;  Laterality: N/A;    COLONOSCOPY W/ BIOPSIES AND POLYPECTOMY  05/30/2012    3mm polyp - Dr Kenny Vargas    COLONOSCOPY W/ BIOPSIES AND POLYPECTOMY  10/24/2017    2 polyps - Dr Kenny Vargas    COLONOSCOPY W/ BIOPSIES AND POLYPECTOMY  07/22/2014    2 polyps - Dr Kenny Vargas    HEMICOLECTOMY, RIGHT, LAPAROSCOPIC  05/20/2011    Dr Elijah Huntley    LUMBAR DISCECTOMY      MEDIPORT INSERTION, SINGLE  06/16/2011    Dr Elijah Huntley     Family History   Problem Relation Age of Onset    No Known Problems Mother     Other Father 42        intestinal rupture - Cause of death     Social History     Tobacco Use     Smoking status: Former     Types: Cigarettes    Smokeless tobacco: Never   Substance Use Topics    Alcohol use: Not Currently    Drug use: Never     Review of Systems   Constitutional: Negative.    HENT: Negative.     Eyes: Negative.    Respiratory: Negative.  Negative for shortness of breath.    Cardiovascular:  Positive for leg swelling. Negative for chest pain.   Gastrointestinal: Negative.    Endocrine: Negative.    Genitourinary: Negative.    Musculoskeletal: Negative.    Skin: Negative.    Allergic/Immunologic: Negative.    Neurological: Negative.    Hematological: Negative.    Psychiatric/Behavioral: Negative.     All other systems reviewed and are negative.      Physical Exam     Initial Vitals   BP Pulse Resp Temp SpO2   12/14/23 1627 12/14/23 1627 12/14/23 1627 12/14/23 1633 12/14/23 1633   106/63 (!) 56 18 96.5 °F (35.8 °C) 97 %      MAP       --                Physical Exam    Nursing note and vitals reviewed.  Constitutional: He appears well-developed and well-nourished. He is diaphoretic.   HENT:   Head: Normocephalic and atraumatic.   Eyes: EOM are normal. Pupils are equal, round, and reactive to light.   Neck: Neck supple.   Normal range of motion.  Cardiovascular:  Normal rate, regular rhythm, normal heart sounds and intact distal pulses.           Pulmonary/Chest: Breath sounds normal.   Abdominal: Abdomen is soft. Bowel sounds are normal.   Musculoskeletal:         General: Normal range of motion.      Cervical back: Normal range of motion and neck supple.     Neurological: He is alert and oriented to person, place, and time. He has normal strength. GCS score is 15. GCS eye subscore is 4. GCS verbal subscore is 5. GCS motor subscore is 6.   Psychiatric: He has a normal mood and affect. His behavior is normal. Judgment and thought content normal.         ED Course   Procedures  Labs Reviewed   COMPREHENSIVE METABOLIC PANEL - Abnormal; Notable for the following components:       Result Value     Chloride 111 (*)     Carbon Dioxide 21 (*)     Blood Urea Nitrogen 33.0 (*)     Creatinine 1.82 (*)     Calcium Level Total 7.0 (*)     Protein Total 3.5 (*)     Albumin Level 1.2 (*)     Globulin 2.3 (*)     Albumin/Globulin Ratio 0.5 (*)     All other components within normal limits   TSH - Abnormal; Notable for the following components:    TSH 6.801 (*)     All other components within normal limits   B-TYPE NATRIURETIC PEPTIDE - Abnormal; Notable for the following components:    Natriuretic Peptide 181.7 (*)     All other components within normal limits   CBC WITH DIFFERENTIAL - Abnormal; Notable for the following components:    MCHC 31.9 (*)     RDW 17.5 (*)     IG# 0.26 (*)     All other components within normal limits   PROTIME-INR - Abnormal; Notable for the following components:    PT 12.7 (*)     All other components within normal limits   APTT - Abnormal; Notable for the following components:    PTT 33.4 (*)     All other components within normal limits   TROPONIN I - Normal   CK-MB - Normal   CK - Normal   URINALYSIS, REFLEX TO URINE CULTURE - Normal   LACTIC ACID, PLASMA - Normal   COVID/RSV/FLU A&B PCR - Normal    Narrative:     The Xpert Xpress SARS-CoV-2/FLU/RSV plus is a rapid, multiplexed real-time PCR test intended for the simultaneous qualitative detection and differentiation of SARS-CoV-2, Influenza A, Influenza B, and respiratory syncytial virus (RSV) viral RNA in either nasopharyngeal swab or nasal swab specimens.         D DIMER, QUANTITATIVE - Normal   CBC W/ AUTO DIFFERENTIAL    Narrative:     The following orders were created for panel order CBC auto differential.  Procedure                               Abnormality         Status                     ---------                               -----------         ------                     CBC with Differential[[id number]]       Abnormal            Final result                 Please view results for these tests on the individual orders.      EKG Readings: (Independently Interpreted)   Initial Reading: No STEMI. Rhythm: Normal Sinus Rhythm. Heart Rate: 57. Conduction: Normal. Axis: Normal. Q Waves: V1, V2 and V3. Clinical Impression: Normal Sinus Rhythm     ECG Results              EKG 12-lead (Final result)  Result time 12/15/23 08:36:38      Final result by Interface, Lab In [facility] (12/15/23 08:36:38)                   Narrative:    Test Reason : R07.9,    Vent. Rate : 057 BPM     Atrial Rate : 035 BPM     P-R Int : 000 ms          QRS Dur : 068 ms      QT Int : 474 ms       P-R-T Axes : 000 052 070 degrees     QTc Int : 461 ms    Sinus bradycardia  Low voltage QRS  Baseline Artifact  Abnormal ECG    Confirmed by Mahin Bridges MD ([id number]) on 12/15/2023 8:36:26 AM    Referred By: MICHELE   SELF           Confirmed By:Mahin Bridges MD                                  Imaging Results              X-Ray Chest PA And Lateral (Final result)  Result time 12/14/23 17:39:06      Final result by Patrick Dela Cruz MD (12/14/23 17:39:06)                   Impression:      Left small pleural effusion and basilar atelectasis.      Electronically signed by: Patrick Dela Cruz  Date:    12/14/2023  Time:    17:39               Narrative:    EXAMINATION:  XR CHEST PA AND LATERAL    CLINICAL HISTORY:  Chest Pain;    TECHNIQUE:  Two-view    COMPARISON:  June 30, 2023..    FINDINGS:  Cardiopericardial silhouette is within normal limits.  There is small left pleural effusion and left lower lung zone atelectasis.  No consolidation, pulmonary edema or pneumothorax.                                       CT Head Without Contrast (Final result)  Result time 12/14/23 17:42:44      Final result by Patrick Dela Cruz MD (12/14/23 17:42:44)                   Impression:      1.  No acute intracranial findings identified.    2.  Chronic microangiopathic ischemia and atrophy.      Electronically signed by: Patrick Dela Cruz  Date:    12/14/2023  Time:    17:42               Narrative:     EXAMINATION:  CT HEAD WITHOUT CONTRAST    CLINICAL HISTORY:  Dizziness, persistent/recurrent, cardiac or vascular cause suspected;    TECHNIQUE:  Sequential axial images were performed of the brain without contrast.    Dose product length of 881 mGycm. Automated exposure control was utilized to minimize radiation dose.    COMPARISON:  May 12, 2023.    FINDINGS:  There is no intracranial mass effect, midline shift, hydrocephalus or hemorrhage. There is no sulcal effacement or low attenuation changes to suggest recent large vessel territory infarction. Chronic appearing periventricular and subcortical white matter low attenuation changes are present and are consistent with chronic microangiopathic ischemia. The ventricular system and sulcal markings prominence is consistent with atrophy. There is no acute extra axial fluid collection.  Right maxillary sinus retention cyst.  Otherwise, visualized paranasal sinuses are clear without mucosal thickening, polypoidal abnormality or air-fluid levels. Mastoid air cells aeration is optimal.                                       Medications   bumetanide injection 1 mg (0 mg Intravenous Hold 12/15/23 0900)   Amino acid 4.25% - dextrose 5% (CLINIMIX-E) solution (1L provides 42.5 gm AA, 50 gm CHO (170 kcal/L dextrose), Na 35, K 30, Mg 5, Ca 4.5, Acetate 70, Cl 39, Phos 15) ( Intravenous Stopped 12/16/23 1759)   albumin human 25% bottle 12.5 g (0 g Intravenous Stopped 12/14/23 1735)   furosemide injection 60 mg (60 mg Intravenous Given 12/14/23 1657)   albuterol-ipratropium 2.5 mg-0.5 mg/3 mL nebulizer solution 3 mL (3 mLs Nebulization Given 12/14/23 1828)   furosemide injection 40 mg (40 mg Intravenous Given 12/14/23 1826)   albumin human 25% bottle 50 g ( Intravenous Stopped 12/15/23 2145)   sodium chloride 0.9% bolus 500 mL 500 mL ( Intravenous Stopped 12/15/23 2246)   albumin human 25% bottle 50 g (0 g Intravenous Stopped 12/16/23 0025)   glucagon (human recombinant) injection  1 mg (1 mg Intramuscular Given 12/16/23 0028)   potassium chloride SA CR tablet 20 mEq (20 mEq Oral Given 12/16/23 1415)   potassium chloride SA CR tablet 40 mEq (40 mEq Oral Given 12/17/23 1554)     Medical Decision Making  Amount and/or Complexity of Data Reviewed  Labs: ordered.  Radiology: ordered.  Discussion of management or test interpretation with external provider(s): Long discussion with the patient's primary care provider.  Patient is hypothermic and hypotensive and clinically appears ill.  However extensive workup here is normal from a laboratory standpoint.  Chest x-ray is normal as is CT of the head.  At this time, I do not have a definitive diagnosis to explain this patient's symptomatology.  I was expecting to find some sort of renal hepatic or cardiac dysfunction but none of that has played out.  Additionally the patient's D-dimer is normal.  Given these findings, the patient demands at a minimum observation with serial cardiac enzymes and an echo in the morning.  We will also diurese overnight with pulses of albumin and Lasix.  This has already been discussed with the patient's primary care provider and he is in agreement.    Risk  Prescription drug management.  Decision regarding hospitalization.                                      Clinical Impression:  Final diagnoses:  [R07.9] Chest pain  [I95.9] Hypotension (Primary)  [R60.1] Anasarca          ED Disposition Condition    Observation Fair                Antoni Yi MD  12/14/23 [id number]       Antoni Yi MD  01/08/24 0998

## 2023-12-14 NOTE — ED NOTES
Pt states they are cold. Skin cold to the touch and core temp is 95.4 axillary.  Placed on bear hugger and warm blankets at this time.  Notified Dr. Yi.     When asked wife asked about Code status wife stated that she wants everything done as far as heart stopping and stopped breathing.

## 2023-12-15 PROBLEM — E43 SEVERE PROTEIN-CALORIE MALNUTRITION: Status: ACTIVE | Noted: 2023-01-01

## 2023-12-15 PROBLEM — R60.1 ANASARCA: Status: ACTIVE | Noted: 2023-01-01

## 2023-12-15 PROBLEM — I95.9 HYPOTENSION: Status: ACTIVE | Noted: 2023-01-01

## 2023-12-15 PROBLEM — R53.1 WEAKNESS: Status: ACTIVE | Noted: 2023-01-01

## 2023-12-15 PROBLEM — R33.9 URINARY RETENTION: Status: ACTIVE | Noted: 2023-01-01

## 2023-12-15 PROBLEM — R00.1 BRADYCARDIA: Status: ACTIVE | Noted: 2023-01-01

## 2023-12-15 NOTE — ASSESSMENT & PLAN NOTE
Likely due to sotalol dose being too high for patient as well as poor nutrition.     Will order Physical therapy and Occupational therapy.     Will likely need rolling walker and home health at discharge.

## 2023-12-15 NOTE — PLAN OF CARE
SLP POC initiated. SLP rec con'tregular diet with thin liquids. SLP to f/u for diet tolerance as Pt c/o stuck sensation at times. No coughing at mealtime reported. Swallow WNL at CSE.     Problem: SLP  Goal: SLP Goal  Description: LT. Pt will tolerate regular consistency diet without s/s aspiration and improved oral intake.     STGs:  1. Pt will tolerate regular consistency diet with thin liquids without overt s/s aspiration and without c/o stuck sensation.   Outcome: Ongoing, Progressing

## 2023-12-15 NOTE — H&P
[facility] - Medical Surgical Unit  [facility] Medicine  History & Physical    Patient Name: Supa Carmen  MRN: [id number]  Patient Class: IP- Inpatient  Admission Date: 12/14/2023  Attending Physician: Randy Evans DO   Primary Care Provider: Randy Evans DO         Patient information was obtained from patient, spouse/SO, past medical records, and ER records.     Subjective:     Principal Problem:Hypotension    Chief Complaint:   Chief Complaint   Patient presents with    Weakness     At Dr Padilla s office, possible beta blocker overdose, weakness,        HPI: Patient is an 83y Male with history of Chron's disease, history of colon cancer, atrial fibrillation status post cardioversion who initially present to outpatient clinic on 12/14/23 for hypotension, edema, and general weakness.  On sotalol 120mg BID. Workup in ED unremarkable with the exception of an albumin of 1.2. Placed in observation overnight with albumin and lasix ordered to help with significant peripheral edema.      Overnight patient had minimal urine output. Bladder scan this morning demonstrated over 600ml of retained urine in bladder. Sandoval catheter placed. Patient and family state he has had very poor appetite in recent weeks and has been feeling progressively weaker. Will admit to inpatient for continued hypotension while medications are adjusted as well as to begin IV nutrition with plans for PEG tube placement early next week.     Past Medical History:   Diagnosis Date    Atrial fibrillation     CAD (coronary artery disease)     Chemotherapy-induced neuropathy     Diverticulosis     DJD (degenerative joint disease)     Gout     History of colon cancer, stage III 05/20/2011    HLD (hyperlipidemia)     HTN (hypertension)     Personal history of colonic polyps 10/24/2017    s/p polypectomy - Dr Kenny Vargas       Past Surgical History:   Procedure Laterality Date    APPENDECTOMY      CARDIAC CATHETERIZATION  10/14/2009     Dr Tim Bryan    COLONOSCOPY N/A 06/27/2023    Procedure: COLONOSCOPY;  Surgeon: Kenny Vargas MD;  Location: [facility];  Service: Gastroenterology;  Laterality: N/A;    COLONOSCOPY W/ BIOPSIES AND POLYPECTOMY  05/30/2012    3mm polyp - Dr Kenny Vargas    COLONOSCOPY W/ BIOPSIES AND POLYPECTOMY  10/24/2017    2 polyps - Dr Kenny Vargas    COLONOSCOPY W/ BIOPSIES AND POLYPECTOMY  07/22/2014    2 polyps - Dr Kenny Vargas    HEMICOLECTOMY, RIGHT, LAPAROSCOPIC  05/20/2011    Dr Elijah Huntley    LUMBAR DISCECTOMY      MEDIPORT INSERTION, SINGLE  06/16/2011    Dr Elijah Huntley       Review of patient's allergies indicates:  No Known Allergies    No current facility-administered medications on file prior to encounter.     Current Outpatient Medications on File Prior to Encounter   Medication Sig    apixaban (ELIQUIS) 5 mg Tab Take 1 tablet (5 mg total) by mouth 2 (two) times a day.    gabapentin (NEURONTIN) 300 MG capsule Take 1 capsule (300 mg total) by mouth 3 (three) times daily. (Patient taking differently: Take 300 mg by mouth daily as needed.)    multivitamin (THERAGRAN) per tablet Take 1 tablet by mouth once daily.    sotaloL (BETAPACE) 120 MG Tab Take 120 mg by mouth 2 (two) times daily.     Family History       Problem Relation (Age of Onset)    No Known Problems Mother    Other Father (42)          Tobacco Use    Smoking status: Former     Types: Cigarettes    Smokeless tobacco: Never   Substance and Sexual Activity    Alcohol use: Not Currently    Drug use: Never    Sexual activity: Not Currently     Review of Systems   Constitutional:  Positive for activity change, appetite change and fatigue. Negative for chills and fever.   HENT:  Negative for congestion and sore throat.    Respiratory:  Positive for shortness of breath. Negative for chest tightness and wheezing.    Cardiovascular:  Positive for leg swelling. Negative for chest pain and palpitations.   Gastrointestinal:  Negative  for abdominal distention, abdominal pain, constipation, nausea and vomiting.   Genitourinary:  Positive for difficulty urinating. Negative for dysuria.   Musculoskeletal:  Negative for arthralgias and back pain.   Skin:  Positive for pallor. Negative for rash and wound.   Neurological:  Negative for dizziness and headaches.   Psychiatric/Behavioral:  Negative for sleep disturbance. The patient is not nervous/anxious.      Objective:     Vital Signs (Most Recent):  Temp: 97.9 °F (36.6 °C) (12/15/23 0757)  Pulse: 66 (12/15/23 0757)  Resp: 17 (12/15/23 0309)  BP: (!) 100/48 (12/15/23 1236)  SpO2: 97 % (12/15/23 0757) Vital Signs (24h Range):  Temp:  [95.4 °F (35.2 °C)-97.9 °F (36.6 °C)] 97.9 °F (36.6 °C)  Pulse:  [55-74] 66  Resp:  [15-20] 17  SpO2:  [96 %-99 %] 97 %  BP: ()/(48-75) 100/48     Weight: 70.7 kg (155 lb 13.8 oz)  Body mass index is 25.94 kg/m².     Physical Exam  Constitutional:       Appearance: He is ill-appearing.   Cardiovascular:      Rate and Rhythm: Regular rhythm. Bradycardia present.      Pulses: Normal pulses.      Heart sounds: Normal heart sounds.   Pulmonary:      Effort: Pulmonary effort is normal.      Breath sounds: Normal breath sounds.   Abdominal:      General: Abdomen is flat. Bowel sounds are normal.      Palpations: Abdomen is soft.   Musculoskeletal:         General: Swelling present. Normal range of motion.      Right lower leg: Edema present.      Left lower leg: Edema present.      Comments: Muscle wasting in face.    Skin:     Coloration: Skin is pale.      Findings: No lesion.   Neurological:      General: No focal deficit present.      Mental Status: He is alert. Mental status is at baseline.   Psychiatric:         Mood and Affect: Mood normal.         Behavior: Behavior normal.   Cranial Nerve 1 not tested. Cranial nerves II-XII within normal limits.           Significant Labs: All pertinent labs within the past 24 hours have been reviewed.    Significant Imaging: I  have reviewed all pertinent imaging results/findings within the past 24 hours.  Assessment/Plan:     * Hypotension  Will stop sotalol and provide rate control for atrial fibrillation with metoprolol tartrate beginning at 12.5mg BID  IV metoprolol ordered for PRN tachycardia.       Severe protein-calorie malnutrition  Nutrition consulted. Most recent weight and BMI monitored-     Measurements:  Wt Readings from Last 1 Encounters:   12/15/23 70.7 kg (155 lb 13.8 oz)   Body mass index is 25.94 kg/m².    Patient to be screened and assessed by RD.    Albumin 1.2 with facial muscle wasting. Will consult RD and start on Clindamix. Plan to place PEG tube early next week.     Speech therapy consulted.       Bradycardia  Will stop sotalol.  Hold parameters for metoprolol.       Weakness  Likely due to sotalol dose being too high for patient as well as poor nutrition.     Will order Physical therapy and Occupational therapy.     Will likely need rolling walker and home health at discharge.       A-fib  Status-post cardioversion.    Will hold home sotalol and try to transition patient to metoprolol.       VTE Risk Mitigation (From admission, onward)           Ordered     enoxaparin injection 30 mg  Daily         12/14/23 2003     IP VTE HIGH RISK PATIENT  Once         12/14/23 2003     Place sequential compression device  Until discontinued         12/14/23 2003                            Randy Evans DO  Department of Hospital Medicine  [facility] - Medical Surgical Unit

## 2023-12-15 NOTE — HPI
Patient is an 83y Male with history of Chron's disease, history of colon cancer, atrial fibrillation status post cardioversion who initially present to outpatient clinic on 12/14/23 for hypotension, edema, and general weakness.  On sotalol 120mg BID. Workup in ED unremarkable with the exception of an albumin of 1.2. Placed in observation overnight with albumin and lasix ordered to help with significant peripheral edema.      Overnight patient had minimal urine output. Bladder scan this morning demonstrated over 600ml of retained urine in bladder. Sandoval catheter placed. Patient and family state he has had very poor appetite in recent weeks and has been feeling progressively weaker. Will admit to inpatient for continued hypotension while medications are adjusted as well as to begin IV nutrition with plans for PEG tube placement early next week.

## 2023-12-15 NOTE — ASSESSMENT & PLAN NOTE
Will stop sotalol and provide rate control for atrial fibrillation with metoprolol tartrate beginning at 12.5mg BID  IV metoprolol ordered for PRN tachycardia.

## 2023-12-15 NOTE — ASSESSMENT & PLAN NOTE
Nutrition consulted. Most recent weight and BMI monitored-     Measurements:  Wt Readings from Last 1 Encounters:   12/15/23 70.7 kg (155 lb 13.8 oz)   Body mass index is 25.94 kg/m².    Patient to be screened and assessed by RD.    Albumin 1.2 with facial muscle wasting. Will consult RD and start on Clindamix. Plan to place PEG tube early next week.     Speech therapy consulted.

## 2023-12-15 NOTE — PT/OT/SLP EVAL
Physical Therapy Acute Care Evaluation    Patient Name:  Supa Carmen   MRN:  [id number]    History:     Patient is an 83y Male with history of Chron's disease, history of colon cancer, atrial fibrillation status post cardioversion who initially present to outpatient clinic on 12/14/23 for hypotension, edema, and general weakness.  On sotalol 120mg BID. Workup in ED unremarkable with the exception of an albumin of 1.2. Placed in observation overnight with albumin and lasix ordered to help with significant peripheral edema.       Overnight patient had minimal urine output. Bladder scan this morning demonstrated over 600ml of retained urine in bladder. Sandoval catheter placed. Patient and family state he has had very poor appetite in recent weeks and has been feeling progressively weaker. Will admit to inpatient for continued hypotension while medications are adjusted as well as to begin IV nutrition with plans for PEG tube placement early next week.     Past Medical History:   Diagnosis Date    Atrial fibrillation     CAD (coronary artery disease)     Chemotherapy-induced neuropathy     Diverticulosis     DJD (degenerative joint disease)     Gout     History of colon cancer, stage III 05/20/2011    HLD (hyperlipidemia)     HTN (hypertension)     Personal history of colonic polyps 10/24/2017    s/p polypectomy - Dr Kenny Vargas       Past Surgical History:   Procedure Laterality Date    APPENDECTOMY      CARDIAC CATHETERIZATION  10/14/2009    Dr Tim Bryan    COLONOSCOPY N/A 06/27/2023    Procedure: COLONOSCOPY;  Surgeon: Kenny Vargas MD;  Location: [facility];  Service: Gastroenterology;  Laterality: N/A;    COLONOSCOPY W/ BIOPSIES AND POLYPECTOMY  05/30/2012    3mm polyp - Dr Kenny Vargsa    COLONOSCOPY W/ BIOPSIES AND POLYPECTOMY  10/24/2017    2 polyps - Dr Kenny Vragas    COLONOSCOPY W/ BIOPSIES AND POLYPECTOMY  07/22/2014    2 polyps - Dr Kenny Vargas    HEMICOLECTOMY, RIGHT, LAPAROSCOPIC   "05/20/2011    Dr Elijah Huntley    LUMBAR DISCECTOMY      MEDIPORT INSERTION, SINGLE  06/16/2011    Dr Elijah Huntley       Recent Surgery: * No surgery found *      Subjective     Chief Complaint: "I'm okay"  Patient/Family Comments/goals: "to get stronger and go home"  Pain/Comfort:  Pain Rating 1: 0/10      Living Environment:  Pt currently resides at home with his spouse in a single story home with 3 steps to enter (L railing).  Pt is typically independent with mobility using no AD. Spouse does assist with dressing, but pt is able to bathe independently.  Equipment used at home: none.  DME owned (not currently used): rolling walker.        Objective:     Patient found supine with bed alarm, telemetry  upon PT entry to room.    General Precautions: Standard, fall   Orthopedic Precautions:N/A   Braces: N/A  Respiratory Status: Room air      Functional Mobility:     Assist Level Assistive Device Comments    Bed Mobility SBA  Semi-supine to sitting at EOB. Bed railing utilized for assistance.  Increased time required to reach upright, but pt was able to do so without assistance from PT.    Sit to stand/Stand to sit SBA  Sit to stand/stand to sit performed from EOB.    Transfers      Gait CGA  Pt able to ambulate 165 ft with a step through gait pattern and slow but steady gait speed. Pt appeared slightly guarded due to his recent immobility, though no significant LOB was observed.    Balance Training      Wheelchair Mobility      Stair Climbing      Car Transfer      Other:             Assessment:     Supa Carmen is a 83 y.o. male admitted with a medical diagnosis of Hypotension.  He presents with the following impairments/functional limitations:  weakness, impaired endurance, impaired balance, gait instability, impaired functional mobility.    Rehab Prognosis: Good; patient would benefit from acute skilled PT services to address these deficits and reach maximum level of function.      Additional " information: Pt tolerated evaluation fairly well but overall activity was limited by the arrival of radiology to perform an Echo. Pt was able to mobilize without an assistive device and without overt LOB. Pt was returned to semi-supine for the echo to be performed. Will return to see pt for additional therapy to ensure safety prior to D/C as pt is not yet at his functional baseline.      Patient left supine with all lines intact, bed alarm on, and family present.      Plan:     During this hospitalization, patient to be seen 6 x/week to address the identified rehab impairments via gait training, therapeutic activities, therapeutic exercises and progress toward the following goals:    GOALS:   Multidisciplinary Problems       Physical Therapy Goals          Problem: Physical Therapy    Goal Priority Disciplines Outcome Goal Variances Interventions   Physical Therapy Goal     PT, PT/OT      Description: Patient will increase functional independence with mobility by performin. Sit to stand transfer with Modified [place]  2. Gait  x 300 feet with Modified [place] using No Assistive Device.   3. Ascend/descend 3 stair with left Handrails Supervision.                          Recommendations:     Discharge Recommendations:  Home with  services  Discharge Equipment Recommendations: none     Time Tracking:       PT Start Time: 1336     PT Stop Time: 1352  PT Total Time (min): 16 min     Billable Minutes: Evaluation 16      12/15/2023

## 2023-12-15 NOTE — CONSULTS
Inpatient Nutrition Assessment    Admit Date: 12/14/2023   Total duration of encounter: 1 day   Patient Age: 83 y.o.    Nutrition Recommendation/Prescription     Clinimix recs:  Clinimix 4.25/5 E @ 50 ml/hr to provide 408 kcal (24% est kcal needs) and 51 g Pro (60% pro needs).  If pt able to tolerate higher fluid volume, increase Clinimix 4.25/5 E to 85 ml/hr to provide 694 kcal, 87 g Pro, GIR 1.0.  This will meet 41% kcal needs, 100% protein needs.  Monitor for signs of refeeding (Glu, Mag, Phos) and replace lytes as needed.    Continue current diet as tolerated - honor food prefs as able  When PEG placed, consult RD for tube feeding recs      Communication of Recommendations: reviewed with nurse    Nutrition Assessment     Malnutrition Assessment/Nutrition-Focused Physical Exam    Malnutrition Context: chronic illness (12/15/23 1610)  Malnutrition Level:  (unable to determine, will complete NFPE on follow up) (12/15/23 1610)  Energy Intake (Malnutrition): less than or equal to 50% for greater than or equal to 5 days (12/15/23 1610)  Weight Loss (Malnutrition): greater than 7.5% in 3 months (pt with 8.5% wt loss from June to September 2023. Has not regained wt from UBW.) (12/15/23 1610)  Subcutaneous Fat (Malnutrition):  (unable to assess) (12/15/23 1610)           Muscle Mass (Malnutrition):  (unable to assess) (12/15/23 1610)                          Fluid Accumulation (Malnutrition): moderate to severe (unable to assess) (12/15/23 1610)        A minimum of two characteristics is recommended for diagnosis of either severe or non-severe malnutrition.    Chart Review    Reason Seen: physician consult for clinimix recs    Malnutrition Screening Tool Results   Have you recently lost weight without trying?: No  Have you been eating poorly because of a decreased appetite?: No   MST Score: 0   Diagnosis:  Hypotension, Severe protein-calorie malnutrition    Relevant Medical History: Chron's disease, history of colon  cancer, atrial fibrillation status post cardioversion   Past Medical History:   Diagnosis Date    Atrial fibrillation     CAD (coronary artery disease)     Chemotherapy-induced neuropathy     Diverticulosis     DJD (degenerative joint disease)     Gout     History of colon cancer, stage III 05/20/2011    HLD (hyperlipidemia)     HTN (hypertension)     Personal history of colonic polyps 10/24/2017    s/p polypectomy - Dr Kenny Vargas         Scheduled Medications:  bumetanide, 1 mg, Q12H  enoxparin, 30 mg, Daily  metoprolol tartrate, 12.5 mg, BID  sucralfate, 1 g, QID (AC & HS)    Continuous Infusions:   PRN Medications: acetaminophen, gabapentin, melatonin, metoprolol, ondansetron, sodium chloride 0.9%    Calorie Containing IV Medications: no significant kcals from medications at this time    Recent Labs   Lab 12/14/23  1643 12/15/23  0502    139   K 4.8 3.8   CALCIUM 7.0* 7.1*   CHLORIDE 111* 112*   CO2 21* 20*   BUN 33.0* 34.0*   CREATININE 1.82* 1.80*   EGFRNORACEVR 36 37   GLUCOSE 85 112   BILITOT 0.2  --    ALKPHOS 76  --    ALT 19  --    AST 32  --    ALBUMIN 1.2*  --    WBC 8.37 7.74   HGB 14.5 12.1*   HCT 45.4 36.6*     Nutrition Orders:  Diet heart healthy      Appetite/Oral Intake: poor/0-25% of meals  Factors Affecting Nutritional Intake: decreased appetite  Food/Orthodoxy/Cultural Preferences: unable to obtain  Food Allergies: none reported  Last Bowel Movement: 12/14/23  Wound(s):     Altered Skin Integrity 12/14/23 2000 Right anterior;lower Arm Skin Tear Partial thickness tissue loss. Shallow open ulcer with a red or pink wound bed, without slough. Intact or Open/Ruptured Serum-filled blister.-Tissue loss description: Partial thickness     Comments    12/15:  Received consult for Clinimix recs.  Noted per chart review patient with decreased po intake for several weeks and has been getting weaker.  Plan per MD notes is for PEG tube placement next week.  Reviewed chart hx and noted pt with  "significant wt loss since last admit in  but wt has been stable since September.  Labs / meds reviewed.  Unable to complete NFPE at this time, will attempt on f/up.    Anthropometrics    Height: 5' 5" (165.1 cm), Height Method: Stated  Last Weight: 70.7 kg (155 lb 13.8 oz) (12/15/23 05), Weight Method: Bed Scale  BMI (Calculated): 25.9  BMI Classification: overweight (BMI 25-29.9)        Ideal Body Weight (IBW), Male: 136 lb     % Ideal Body Weight, Male (lb): 114.13 %                 Usual Body Weight (UBW), k.3 kg  % Usual Body Weight: 91.65     Usual Weight Provided By: EMR weight history and previous RD note    Wt Readings from Last 5 Encounters:   12/15/23 70.7 kg (155 lb 13.8 oz)   23 71.2 kg (157 lb)   23 71.2 kg (156 lb 15.5 oz)   23 72.1 kg (159 lb)   23 71.5 kg (157 lb 10.1 oz)     Weight Change(s) Since Admission: 12/15: new admit  Wt Readings from Last 1 Encounters:   12/15/23 0527 70.7 kg (155 lb 13.8 oz)   23 70.4 kg (155 lb 3.3 oz)   23 71.2 kg (157 lb)   Admit Weight: 71.2 kg (157 lb) (23 162), Weight Method: Stated    Estimated Needs    Weight Used For Calorie Calculations: 70.7 kg (155 lb 13.8 oz)  Energy Calorie Requirements (kcal): 8684-6734 kcal/d (25 kcal/kg)  Energy Need Method: Kcal/kg  Weight Used For Protein Calculations: 70.7 kg (155 lb 13.8 oz)  Protein Requirements:  g Pro/d (1.3-1.5 g/kg)  Fluid Requirements (mL): 1800 ml/d (1ml/kcal)    Enteral Nutrition     Patient not receiving enteral nutrition at this time.    Parenteral Nutrition     Patient not receiving parenteral nutrition support at this time.    Evaluation of Received Nutrient Intake    Calories: not meeting estimated needs  Protein: not meeting estimated needs    Patient Education     Not applicable.    Nutrition Diagnosis     PES: Inadequate oral intake related to chronic illness as evidenced by predicted suboptimal energy intake (<50% of estimated " needs met). (new)       Nutrition Interventions     Intervention(s): general/healthful diet, modified composition of parenteral nutrition, modified rate of parenteral nutrition, and collaboration with other providers    Goal: Meet greater than 80% of nutritional needs by follow-up. (new)  Goal: Maintain weight throughout hospitalization. (new)    Nutrition Goals & Monitoring     Dietitian will monitor: food and beverage intake, parenteral nutrition intake, weight, electrolyte/renal panel, and glucose/endocrine profile    Nutrition Risk/Follow-Up: high (follow-up in 1-4 days)   Please consult if re-assessment needed sooner.

## 2023-12-15 NOTE — PT/OT/SLP EVAL
"..[facility]  Speech Therapy Clinical Swallow Exam    Name: Supa Carmen    : 1940 (83 y.o.)  MRN: [id number]  Chief Complaint:   Chief Complaint   Patient presents with    Weakness     At Dr Padilla s office, possible beta blocker overdose, weakness,       Past Medical History:   Diagnosis Date    Atrial fibrillation     CAD (coronary artery disease)     Chemotherapy-induced neuropathy     Diverticulosis     DJD (degenerative joint disease)     Gout     History of colon cancer, stage III 2011    HLD (hyperlipidemia)     HTN (hypertension)     Personal history of colonic polyps 10/24/2017    s/p polypectomy - Dr Kenny Vargas       Past Surgical History:   Procedure Laterality Date    APPENDECTOMY      CARDIAC CATHETERIZATION  10/14/2009    Dr Tim Bryan    COLONOSCOPY N/A 2023    Procedure: COLONOSCOPY;  Surgeon: Kenny Vargas MD;  Location: [facility];  Service: Gastroenterology;  Laterality: N/A;    COLONOSCOPY W/ BIOPSIES AND POLYPECTOMY  2012    3mm polyp - Dr Kenny Vargas    COLONOSCOPY W/ BIOPSIES AND POLYPECTOMY  10/24/2017    2 polyps - Dr Kenny Vargas    COLONOSCOPY W/ BIOPSIES AND POLYPECTOMY  2014    2 polyps - Dr Kenny Vargas    HEMICOLECTOMY, RIGHT, LAPAROSCOPIC  2011    Dr Elijah Huntley    LUMBAR DISCECTOMY      MEDIPORT INSERTION, SINGLE  2011    Dr Elijah Huntley MD reported plan for PEG placement on Monday due to reduced nutritional intake. SLP to rule out swallow as factor in reduced intake.     Predisposing dysphagia risk factors: none  Precipitating dysphagia risk factors: general weakness    Subjective:       Pt REPORT / INTERVIEW: Pt and family reported reduced appetite. Pt reported no swallow difficulty; although with further questioning he reported "stuck" sensation during some meals. No stuck sensation reported this eval.   ORIENTATION: Ox4  AFFECT: WNL  GENERAL COGNITIVE IMPRESSION: Slowed processing at " times, repetition waranted. Pt participated well throughout.     Objective:       ORAL MOTOR EXAM:   Labial, lingual, mandibular ROM was WNL. Surface of tongue was dry. Volitional swallow and cough WNL. OME WNL.      OTHER INFORMATION:  Volitional Swallow: Able to palpate laryngeal rise  Mucosal Quality: Xerostomia  Secretion Management: Secretion Mgmt: adequate  Dentition: Good condition for speech and mastication    [place] SWALLOW PROTOCOL:  The [place] Swallow Protocol was administered. The patient was alert and provided the instructions prior to the beginning of the protocol. The patient consumed 3 oz before putting the cup down. Patient drank with consecutive swallows. Patient with no cough, no throat clear, no wet vocal quality. Patient without overt signs or symptoms of penetration/aspiration. Patient  passed the screener.    PO TRIALS:   Patient presented with:   Ice Chips (IDDSI 0): adequate acceptance via spoon, adequate oral transport, and timely swallow initiation as judged at bedside; no s/s aspiration    Thin (IDDSI 0): adequate acceptance via cup edge and straw, adequate oral transport, and timely swallow initiation as judged at bedside; no s/s aspiration    Puree (IDDSI 4): adequate acceptance via spoon, adequate oral transport, and timely swallow initiation as judged at bedside; no s/s aspiration    Soft and Bite Sized (IDDSI 6): adequate acceptance via spoon, adequate oral transport, and timely swallow initiation as judged at bedside; no s/s aspiration    Hard Solids (IDDSI 7): adequate acceptance via hand, slightly prolonged oral transport, and timely swallow initiation as judged at bedside; no s/s aspiration      *Thicken liquids were not used in this assessment. Ld (2018) reported that thickened liquids have no sound evidence as reducing risk of pneumonia in patients with dysphagia and can cause harm by increasing risk of dehydration. It also presents an increased risk of UTI, electrolyte  imbalance, constipation, fecal impaction, cognitive impairment, functional decline, and even death (Floridalma, 2002; Carolin, 2016).  This supports the assertion that we should confirm a patient requires thickened liquids with an instrumental swallow study prior to recommending them.    Limitations:  reduced appetite, encouragement required to continue     Assessment :     INTERPRETATION:   Pt exhibited functional swallowing skills on this assessment. The patient had no anterior loss of the bolus with adequate closure of the lips around the cup and spoon. Mild residue remained in the oral cavity following the swallow.  Patient without overt clinical signs/symptoms of aspiration on any PO trials given. SLP is recommending regular consistency diet with thin liquids at this time. SLP intervention is not warranted at this time. Please feel free to re-consult as warranted.  SLP educated Pt and wife on CSE results and diet recommendations. SLP to follow up regarding diet tolerance.     PILLARS OF PNA: According to Dr. Tim Drake (2005), there are 3 pillars that contribute to aspiration related pneumonia. The Three Pillars of Aspiration Pneumonia is research showing that aspiration pneumonia only develops when three factors are present: aspiration, compromised immune system, poor oral hygiene. Pt presents with the following Pillars of Pneumonia (Noelle, 2008):  Risk factors Present (yes/no) Comments:   Impaired health status no MD reported all labs normal except albumin   Poor oral health  no    High prevalence of dysphagia risk factors no      RATING SCALES:  Simmons Assessment of Swallowing Ability (MASA): 200  The Simmons Assessment of Swallowing Ability (MASA) was utilized as an instrument to assess a patient's swallowing function at bedside. The MASA measure 24 different areas to gauge a patient's swallowing ability, in order to make appropriate recommendations for diet and fluid intake. The highest possible score is  "200.  MASA Score: /200  Severity Grouping MASA Score- Dysphagia MASA Score- Aspiration   No abnormality detected Less than 178-200 Less than 170-200   Mild Less than 168-177 Less than 149-169   Moderate Less than 139-167 Less than 148   Severe Less than 138 Less than 140   (DESMOND Garcia& PUNEET Lynch, 2008)      Plan:     SLP to further assess with full meal due to Pt c/o stuck sensation "sometimes" when eating meats. No dysphagia noted this evaluation.     Multidisciplinary Problems       SLP Goals          Problem: SLP    Goal Priority Disciplines Outcome   SLP Goal     SLP Ongoing, Progressing   Description: LT. Pt will tolerate regular consistency diet without s/s aspiration and improved oral intake.     STGs:  1. Pt will tolerate regular consistency diet with thin liquids without overt s/s aspiration and without c/o stuck sensation.                        Current Diet Level Recommendations: regular consistency with thin liquids   Aspiration Precautions: none   Mealtime/PO Strategies: none    Patient to be seen:   1-2x  Plan of Care expires:   23  Plan of Care reviewed with:    Pt and wife  SLP Follow-Up:     1-2x after PEG placement      Discharge recommendations:    low intensity therapy   Barriers to Discharge:  Level of Skilled Assistance Needed        Anjelica Pantoja MA, CCC-SLP  12/15/2023      "

## 2023-12-15 NOTE — NURSING
Nurses Note -- 4 Eyes      12/14/2023   8:00 PM      Skin assessed during: Admit      [] No Altered Skin Integrity Present    []Prevention Measures Documented      [x] Yes- Altered Skin Integrity Present or Discovered   [x] LDA Added if Not in Epic (Describe Wound)   [x] New Altered Skin Integrity was Present on Admit and Documented in LDA   [x] Wound Image Taken    Wound Care Consulted? No    Attending Nurse:  Ryan Robles RN    Second RN/Staff Member:  Constance Bang LPN

## 2023-12-15 NOTE — ASSESSMENT & PLAN NOTE
Status-post cardioversion.    Will hold home sotalol and try to transition patient to metoprolol.

## 2023-12-15 NOTE — PLAN OF CARE
Problem: Adult Inpatient Plan of Care  Goal: Plan of Care Review  Outcome: Ongoing, Progressing  Goal: Patient-Specific Goal (Individualized)  Outcome: Ongoing, Progressing  Goal: Absence of Hospital-Acquired Illness or Injury  Outcome: Ongoing, Progressing  Goal: Optimal Comfort and Wellbeing  Outcome: Ongoing, Progressing  Goal: Readiness for Transition of Care  Outcome: Ongoing, Progressing     Problem: Impaired Wound Healing  Goal: Optimal Wound Healing  Outcome: Ongoing, Progressing     Problem: Hypertension Comorbidity  Goal: Blood Pressure in Desired Range  Outcome: Ongoing, Progressing     Problem: Pain Acute  Goal: Acceptable Pain Control and Functional Ability  Outcome: Ongoing, Progressing     Problem: Fall Injury Risk  Goal: Absence of Fall and Fall-Related Injury  Outcome: Ongoing, Progressing     Problem: Fatigue  Goal: Improved Activity Tolerance  Outcome: Ongoing, Progressing     Problem: Dysrhythmia  Goal: Normalized Cardiac Rhythm  Outcome: Ongoing, Progressing     Problem: Fluid Volume Excess  Goal: Fluid Balance  Outcome: Ongoing, Progressing

## 2023-12-15 NOTE — PLAN OF CARE
12/15/23 1152   Discharge Assessment   Assessment Type Discharge Planning Assessment   Confirmed/corrected address, phone number and insurance Yes   Confirmed Demographics Correct on Facesheet   Source of Information family   If unable to respond/provide information was family/caregiver contacted? Yes   Contact Name/Number Ellie Carmen  wife [phone]   When was your last doctors appointment? 12/14/23   Does patient/caregiver understand observation status Yes   Communicated SRINI with patient/caregiver Date not available/Unable to determine   Reason For Admission hypotension   People in Home spouse   Facility Arrived From: Dr Evans's office   Do you expect to return to your current living situation? Yes   Do you have help at home or someone to help you manage your care at home? Yes   Who are your caregiver(s) and their phone number(s)? Ellie (wife)   Prior to hospitilization cognitive status: Alert/Oriented   Current cognitive status: Alert/Oriented   Walking or Climbing Stairs Difficulty yes   Walking or Climbing Stairs   (ambulation difficulty due to weakness but no DME used)   Dressing/Bathing Difficulty no   Do you have any problems with: Needs other help   Specify other help Wife assistance   Home Accessibility stairs to enter home   Number of Stairs, Main Entrance two   Surface of Stairs, Main Entrance concrete   Stair Railings, Main Entrance railing on left side (ascending);railings safe and in good condition   Landing, Stairs, Main Entrance railings present   Home Layout Able to live on 1st floor   Equipment Currently Used at Home blood pressure machine   Readmission within 30 days? No   Patient currently being followed by outpatient case management? No   Do you currently have service(s) that help you manage your care at home? No   Do you take prescription medications? Yes   Do you have prescription coverage? Yes   Coverage Medicare   Do you have any problems affording any of your prescribed  medications? Yes   If yes, what medications? Hattie Mary Bethlisa   Is the patient taking medications as prescribed? yes   Who is going to help you get home at discharge? Ellie (wife)   How do you get to doctors appointments? family or friend will provide   Are you on dialysis? No   Do you take coumadin? No   Discharge Plan A Home with family   Discharge Plan B Home Health   DME Needed Upon Discharge  walker, rolling   Discharge Plan discussed with: Spouse/sig other   Name(s) and Number(s) Ellie (wife) [phone]   Transition of Care Barriers None   Physical Activity   On average, how many days per week do you engage in moderate to strenuous exercise (like a brisk walk)? 2 days   On average, how many minutes do you engage in exercise at this level? 10 min   Financial Resource Strain   How hard is it for you to pay for the very basics like food, housing, medical care, and heating? Somewhat   Housing Stability   In the last 12 months, was there a time when you were not able to pay the mortgage or rent on time? N   In the last 12 months, how many places have you lived? 1   In the last 12 months, was there a time when you did not have a steady place to sleep or slept in a shelter (including now)? N   Transportation Needs   In the past 12 months, has lack of transportation kept you from medical appointments or from getting medications? no   In the past 12 months, has lack of transportation kept you from meetings, work, or from getting things needed for daily living? No   Food Insecurity   Within the past 12 months, you worried that your food would run out before you got the money to buy more. Never true   Within the past 12 months, the food you bought just didn't last and you didn't have money to get more. Never true   Stress   Do you feel stress - tense, restless, nervous, or anxious, or unable to sleep at night because your mind is troubled all the time - these days? Only a littl   Social Connections   In a typical  week, how many times do you talk on the phone with family, friends, or neighbors? Never   How often do you get together with friends or relatives? More than 3  (Occasionally)   How often do you attend Mormonism or Adventism services? More than 4   Do you belong to any clubs or organizations such as Mormonism groups, unions, fraternal or athletic groups, or school groups? Yes  ([place])   How often do you attend meetings of the clubs or organizations you belong to? More than 4   Are you , , , , never , or living with a partner?    Alcohol Use   Q1: How often do you have a drink containing alcohol? Never   Q2: How many drinks containing alcohol do you have on a typical day when you are drinking? None   Q3: How often do you have six or more drinks on one occasion? Never   OTHER   Name(s) of People in Home Ellie (wife)

## 2023-12-15 NOTE — ED NOTES
Family to nurses station.  Stating that patient is complaining of difficulty breathing.  Nurse to room with daughter and patient.  Family requesting O2 for patient. O2 sat 100% on room air.  Breaths 18 unlabored.  Nurse asked if this is a new onset of shortness of breath, or the same SOB they have been having for weeks.  Patient stated that this is the same shortness of breath and not a new onset. Daughter still requesting oxygen.  Nurse stated she would as the doctor.  Doctor Kd to room to talk to daughter.  No new orders for O2 at this time.

## 2023-12-15 NOTE — SUBJECTIVE & OBJECTIVE
Past Medical History:   Diagnosis Date    Atrial fibrillation     CAD (coronary artery disease)     Chemotherapy-induced neuropathy     Diverticulosis     DJD (degenerative joint disease)     Gout     History of colon cancer, stage III 05/20/2011    HLD (hyperlipidemia)     HTN (hypertension)     Personal history of colonic polyps 10/24/2017    s/p polypectomy - Dr Kenny Vargas       Past Surgical History:   Procedure Laterality Date    APPENDECTOMY      CARDIAC CATHETERIZATION  10/14/2009    Dr Tim Bryan    COLONOSCOPY N/A 06/27/2023    Procedure: COLONOSCOPY;  Surgeon: Kenny Vargas MD;  Location: [facility];  Service: Gastroenterology;  Laterality: N/A;    COLONOSCOPY W/ BIOPSIES AND POLYPECTOMY  05/30/2012    3mm polyp - Dr Kenny Vargas    COLONOSCOPY W/ BIOPSIES AND POLYPECTOMY  10/24/2017    2 polyps - Dr Kenny Vargas    COLONOSCOPY W/ BIOPSIES AND POLYPECTOMY  07/22/2014    2 polyps - Dr Kenny Vargas    HEMICOLECTOMY, RIGHT, LAPAROSCOPIC  05/20/2011    Dr Elijah Huntley    LUMBAR DISCECTOMY      MEDIPORT INSERTION, SINGLE  06/16/2011    Dr Elijah Huntley       Review of patient's allergies indicates:  No Known Allergies    No current facility-administered medications on file prior to encounter.     Current Outpatient Medications on File Prior to Encounter   Medication Sig    apixaban (ELIQUIS) 5 mg Tab Take 1 tablet (5 mg total) by mouth 2 (two) times a day.    gabapentin (NEURONTIN) 300 MG capsule Take 1 capsule (300 mg total) by mouth 3 (three) times daily. (Patient taking differently: Take 300 mg by mouth daily as needed.)    multivitamin (THERAGRAN) per tablet Take 1 tablet by mouth once daily.    sotaloL (BETAPACE) 120 MG Tab Take 120 mg by mouth 2 (two) times daily.     Family History       Problem Relation (Age of Onset)    No Known Problems Mother    Other Father (42)          Tobacco Use    Smoking status: Former     Types: Cigarettes    Smokeless tobacco: Never   Substance  and Sexual Activity    Alcohol use: Not Currently    Drug use: Never    Sexual activity: Not Currently     Review of Systems   Constitutional:  Positive for activity change, appetite change and fatigue. Negative for chills and fever.   HENT:  Negative for congestion and sore throat.    Respiratory:  Positive for shortness of breath. Negative for chest tightness and wheezing.    Cardiovascular:  Positive for leg swelling. Negative for chest pain and palpitations.   Gastrointestinal:  Negative for abdominal distention, abdominal pain, constipation, nausea and vomiting.   Genitourinary:  Positive for difficulty urinating. Negative for dysuria.   Musculoskeletal:  Negative for arthralgias and back pain.   Skin:  Positive for pallor. Negative for rash and wound.   Neurological:  Negative for dizziness and headaches.   Psychiatric/Behavioral:  Negative for sleep disturbance. The patient is not nervous/anxious.      Objective:     Vital Signs (Most Recent):  Temp: 97.9 °F (36.6 °C) (12/15/23 0757)  Pulse: 66 (12/15/23 0757)  Resp: 17 (12/15/23 0309)  BP: (!) 100/48 (12/15/23 1236)  SpO2: 97 % (12/15/23 0757) Vital Signs (24h Range):  Temp:  [95.4 °F (35.2 °C)-97.9 °F (36.6 °C)] 97.9 °F (36.6 °C)  Pulse:  [55-74] 66  Resp:  [15-20] 17  SpO2:  [96 %-99 %] 97 %  BP: ()/(48-75) 100/48     Weight: 70.7 kg (155 lb 13.8 oz)  Body mass index is 25.94 kg/m².     Physical Exam  Constitutional:       Appearance: He is ill-appearing.   Cardiovascular:      Rate and Rhythm: Regular rhythm. Bradycardia present.      Pulses: Normal pulses.      Heart sounds: Normal heart sounds.   Pulmonary:      Effort: Pulmonary effort is normal.      Breath sounds: Normal breath sounds.   Abdominal:      General: Abdomen is flat. Bowel sounds are normal.      Palpations: Abdomen is soft.   Musculoskeletal:         General: Swelling present. Normal range of motion.      Right lower leg: Edema present.      Left lower leg: Edema present.       Comments: Muscle wasting in face.    Skin:     Coloration: Skin is pale.      Findings: No lesion.   Neurological:      General: No focal deficit present.      Mental Status: He is alert. Mental status is at baseline.   Psychiatric:         Mood and Affect: Mood normal.         Behavior: Behavior normal.             Significant Labs: All pertinent labs within the past 24 hours have been reviewed.    Significant Imaging: I have reviewed all pertinent imaging results/findings within the past 24 hours.

## 2023-12-16 NOTE — PROGRESS NOTES
[facility]  [facility] Medicine  Progress Note    Patient Name: Supa Carmen  MRN: [id number]  Patient Class: IP- Inpatient   Admission Date: 12/14/2023  Length of Stay: 1 days  Attending Physician: Randy Evans DO  Primary Care Provider: Randy Evans DO        Subjective:     Principal Problem:Hypotension        HPI:  Patient is an 83y Male with history of Chron's disease, history of colon cancer, atrial fibrillation status post cardioversion who initially present to outpatient clinic on 12/14/23 for hypotension, edema, and general weakness.  On sotalol 120mg BID. Workup in ED unremarkable with the exception of an albumin of 1.2. Placed in observation overnight with albumin and lasix ordered to help with significant peripheral edema.      Overnight patient had minimal urine output. Bladder scan this morning demonstrated over 600ml of retained urine in bladder. Sandoval catheter placed. Patient and family state he has had very poor appetite in recent weeks and has been feeling progressively weaker. Will admit to inpatient for continued hypotension while medications are adjusted as well as to begin IV nutrition with plans for PEG tube placement early next week.     Overview/Hospital Course:  12/16/23-Patient seems to be a little better today as per family.  He required NS bolus, a total of 100g albumin and a dose of glucagon last night due to hypotension.  Today his H&H did drop and thus will get 1 unit PRBC.    Interval History:     Review of Systems   Constitutional:  Positive for activity change and fatigue.   HENT: Negative.     Eyes: Negative.    Respiratory:  Positive for shortness of breath.    Cardiovascular: Negative.    Gastrointestinal: Negative.    Endocrine: Negative.    Genitourinary: Negative.    Musculoskeletal:  Positive for arthralgias and gait problem.   Skin: Negative.    Allergic/Immunologic: Negative.    Neurological:  Positive for weakness.    Hematological:  Bruises/bleeds easily.   Psychiatric/Behavioral:  Positive for confusion.      Objective:     Vital Signs (Most Recent):  Temp: 98 °F (36.7 °C) (12/16/23 0800)  Pulse: 77 (12/16/23 0800)  Resp: 20 (12/16/23 0800)  BP: 115/71 (12/16/23 0800)  SpO2: 97 % (12/16/23 0800) Vital Signs (24h Range):  Temp:  [97.6 °F (36.4 °C)-98.3 °F (36.8 °C)] 98 °F (36.7 °C)  Pulse:  [51-83] 77  Resp:  [14-20] 20  SpO2:  [95 %-99 %] 97 %  BP: ()/(46-71) 115/71     Weight: 69.5 kg (153 lb 3.5 oz)  Body mass index is 25.5 kg/m².    Intake/Output Summary (Last 24 hours) at 12/16/2023 1241  Last data filed at 12/16/2023 0527  Gross per 24 hour   Intake 1138.34 ml   Output 2200 ml   Net -1061.66 ml         Physical Exam  Constitutional:       Appearance: Normal appearance. He is normal weight.   HENT:      Head: Normocephalic and atraumatic.      Nose: Nose normal.      Mouth/Throat:      Mouth: Mucous membranes are moist.      Pharynx: Oropharynx is clear.   Eyes:      Extraocular Movements: Extraocular movements intact.      Conjunctiva/sclera: Conjunctivae normal.      Pupils: Pupils are equal, round, and reactive to light.   Cardiovascular:      Rate and Rhythm: Normal rate and regular rhythm.      Pulses: Normal pulses.      Heart sounds: Normal heart sounds.   Pulmonary:      Effort: Pulmonary effort is normal.      Breath sounds: Normal breath sounds.   Abdominal:      General: Bowel sounds are normal.      Palpations: Abdomen is soft.   Musculoskeletal:         General: Normal range of motion.      Cervical back: Normal range of motion and neck supple.   Skin:     General: Skin is warm and dry.      Capillary Refill: Capillary refill takes more than 3 seconds.   Neurological:      General: No focal deficit present.      Mental Status: He is alert. Mental status is at baseline.   Psychiatric:         Mood and Affect: Mood normal.         Behavior: Behavior normal.         Thought Content: Thought content normal.          Judgment: Judgment normal.             Significant Labs: All pertinent labs within the past 24 hours have been reviewed.  BMP:   Recent Labs   Lab 12/16/23  0445      K 3.1*   CO2 22*   BUN 35.0*   CREATININE 1.46*   CALCIUM 7.1*   MG 1.80     CBC:   Recent Labs   Lab 12/14/23  1643 12/15/23  0502 12/16/23  0445   WBC 8.37 7.74 5.23   HGB 14.5 12.1* 8.4*   HCT 45.4 36.6* 25.7*    247 169     CMP:   Recent Labs   Lab 12/14/23  1643 12/15/23  0502 12/16/23  0445    139 140   K 4.8 3.8 3.1*   CO2 21* 20* 22*   BUN 33.0* 34.0* 35.0*   CREATININE 1.82* 1.80* 1.46*   CALCIUM 7.0* 7.1* 7.1*   ALBUMIN 1.2*  --  2.8*   BILITOT 0.2  --  0.5   ALKPHOS 76  --  39*   AST 32  --  15   ALT 19  --  8     Magnesium:   Recent Labs   Lab 12/16/23  0445   MG 1.80       Significant Imaging: I have reviewed all pertinent imaging results/findings within the past 24 hours.    Assessment/Plan:      * Hypotension  Will stop sotalol and provide rate control for atrial fibrillation with metoprolol tartrate beginning at 12.5mg BID  IV metoprolol ordered for PRN tachycardia.       Urinary retention  Sandoval placed. Will need Urology follow up at discharge.       Weakness  Likely due to sotalol dose being too high for patient as well as poor nutrition.     Will order Physical therapy and Occupational therapy.     Will likely need rolling walker and home health at discharge.       Severe protein-calorie malnutrition  Nutrition consulted. Most recent weight and BMI monitored-     Measurements:  Wt Readings from Last 1 Encounters:   12/15/23 70.7 kg (155 lb 13.8 oz)   Body mass index is 25.94 kg/m².    Patient to be screened and assessed by RD.    Albumin 1.2 with facial muscle wasting. Will consult RD and start on Clindamix. Plan to place PEG tube early next week.     Speech therapy consulted.       Bradycardia  Will stop sotalol.  Hold parameters for metoprolol.       A-fib  Status-post cardioversion.    Will hold home  sotalol and try to transition patient to metoprolol.       VTE Risk Mitigation (From admission, onward)           Ordered     enoxaparin injection 30 mg  Daily         12/14/23 2003     IP VTE HIGH RISK PATIENT  Once         12/14/23 2003     Place sequential compression device  Until discontinued         12/14/23 2003                Transfuse 1 unit  Follow labs  DVT prophylaxis  Stop B-blockers for now except PRN  Therapy  Clinimix    Critical care time=38mins    Discharge Planning   SRINI:      Code Status: Full Code   Is the patient medically ready for discharge?:     Reason for patient still in hospital (select all that apply): Patient trending condition, Laboratory test, Treatment, and PT / OT recommendations  Discharge Plan A: Home with family                  Antoine Rosa MD  Department of Hospital Medicine   [facility]

## 2023-12-16 NOTE — NURSING
Notified dr acosta via phone pt blood pressure is 78/42 recheck 83/46 after 500ml normal saline bolus. Pt is asymptomatic. Pt resting, but easy to arouse and heart rate is 68. Md stated to give 50g of albumin and monitor.

## 2023-12-16 NOTE — PROGRESS NOTES
Patient ate 1/3 burger with 3 fries, and a half bottle of boost . Patient's family stated this is the most the patient has eaten in a while.

## 2023-12-16 NOTE — NURSING
Notified dr Rosa via phone pt is hypotensive and his blood pressure is 80/41. Md stated to give pt 50g of albumin since his albumin is low and he came in with third spacing. Md stated to hold lopressor for tonight and notify if any changes.

## 2023-12-16 NOTE — NURSING
Notified dr acosta via phone pt blood pressure had dropped to 68/43. Pt put in [place] and pt pressure came up to 94/50 with a map of 64. Pt is non symptomatic pt alert and able to answer questions. Md stated to give pt a dose of glucagon.

## 2023-12-16 NOTE — HOSPITAL COURSE
12/16/23-Patient seems to be a little better today as per family.  He required NS bolus, a total of 100g albumin and a dose of glucagon last night due to hypotension.  Today his H&H did drop and thus will get 1 unit PRBC.    12/17/23-Patient is eating more and feeling better.  Will start Megace today.  Plan was for PEG tomorrow but we will postpone procedure because he seems to be doing better.    12/18/23-Patient will be transitioned to swing bed today for continued therapy.  Overall he is improving.  Exam(Alert, NAD, RRR, CTA, BS +, some edema, ROM I)

## 2023-12-16 NOTE — PLAN OF CARE
Problem: Adult Inpatient Plan of Care  Goal: Plan of Care Review  Outcome: Ongoing, Progressing  Goal: Patient-Specific Goal (Individualized)  Outcome: Ongoing, Progressing  Goal: Absence of Hospital-Acquired Illness or Injury  Outcome: Ongoing, Progressing  Goal: Optimal Comfort and Wellbeing  Outcome: Ongoing, Progressing  Goal: Readiness for Transition of Care  Outcome: Ongoing, Progressing     Problem: Impaired Wound Healing  Goal: Optimal Wound Healing  Outcome: Ongoing, Progressing     Problem: Hypertension Comorbidity  Goal: Blood Pressure in Desired Range  Outcome: Ongoing, Progressing     Problem: Pain Acute  Goal: Acceptable Pain Control and Functional Ability  Outcome: Ongoing, Progressing     Problem: Fall Injury Risk  Goal: Absence of Fall and Fall-Related Injury  Outcome: Ongoing, Progressing     Problem: Fatigue  Goal: Improved Activity Tolerance  Outcome: Ongoing, Progressing     Problem: Dysrhythmia  Goal: Normalized Cardiac Rhythm  Outcome: Ongoing, Progressing     Problem: Fluid Volume Excess  Goal: Fluid Balance  Outcome: Ongoing, Progressing     Problem: Chest Pain  Goal: Resolution of Chest Pain Symptoms  Outcome: Ongoing, Progressing     Problem: Skin Injury Risk Increased  Goal: Skin Health and Integrity  Outcome: Ongoing, Progressing

## 2023-12-16 NOTE — PROGRESS NOTES
Dr. Rosa present with family (wife,daughter) answering all questions and concerns about patient's diagnoses. Pt 's family verbalized understanding of Q & A provided by Dr. Rosa at this time . Family aware that open-lines of communication between MD, Nurse, and staff are encouraged and welcomed.

## 2023-12-16 NOTE — PT/OT/SLP PROGRESS
.         Physical Therapy Treatment Note           Name: Supa Carmen    : 1940 (83 y.o.)  MRN: [id number]             Subjective Assessment:     No complaints  Lethargic   x Awake, alert, cooperative  Uncooperative    Agitated  c/o pain    Appropriate  c/o fatigue   x Confused  Treated at bedside     Emotionally labile  Treated in gym/dept.    Impulsive  Other:    Flat affect       Pain/Comfort:    Pain Rating 1: 0/10    Therapy Precautions:     x Cognitive deficits  Spinal precautions    Collar - hard  Sternal precautions    Collar - soft   TLSO   x Fall risk  LSO    Hip precautions - posterior  Knee immobilizer    Hip precautions - anterior  WBAT    Impaired communication  Partial weightbearing    Oxygen  TTWB    PEG tube  NWB    Visual deficits  Other:    Hearing deficits        Vital Signs:     Vitals Value    Room air      Oxygen (L)     Blood pressure     Heart rate          Mobility Training:     Assist level Comments    Bed mobility Min   A Supine to sit and sit to supine    Sit to stand/stand to sit Min A  Sit to stand x 5 reps with VC's for hand placement, scooting forward, and UT WS   Transfers     Gait CGA Slow shuffling gait   Balance training     Wheelchair mobility     Car transfer     Other:          Therapeutic Exercise:     Exercise Sets Reps Comments                               Additional Treatment:    Patient tolerated activity well.  Patient requires cueing for step length, heel strike, and step through gait pattern.     Assessment:     Patient tolerated session well.    PT Plan:     Continue with current POC.     GOALS:   Multidisciplinary Problems       Physical Therapy Goals          Problem: Physical Therapy    Goal Priority Disciplines Outcome Goal Variances Interventions   Physical Therapy Goal     PT, PT/OT Ongoing, Progressing     Description: Patient will increase functional independence with mobility by performin. Sit to stand transfer with Modified  [place]  2. Gait  x 300 feet with Modified [place] using No Assistive Device.   3. Ascend/descend 3 stair with left Handrails Supervision.                            Discharge Recommendations:       Home with HHx    Discharge Equipment Recommendations:     none     At end of treatment, patient remained:      Up in chair    x In bed   x With alarm activated   x Bed rails up   x Call bell in reach      Family/friends present     Restraints secured properly     In bathroom with CNA/RN notified     In gym with PT/PTA/tech     Nurse aware     Other:           PT Start Time: 0915     PT Stop Time: 0945  PT Total Time (min): 30 min     Billable Minutes: Gait Training 15 and Therapeutic Activity 15      12/16/2023

## 2023-12-16 NOTE — SUBJECTIVE & OBJECTIVE
Interval History:     Review of Systems   Constitutional:  Positive for activity change and fatigue.   HENT: Negative.     Eyes: Negative.    Respiratory:  Positive for shortness of breath.    Cardiovascular: Negative.    Gastrointestinal: Negative.    Endocrine: Negative.    Genitourinary: Negative.    Musculoskeletal:  Positive for arthralgias and gait problem.   Skin: Negative.    Allergic/Immunologic: Negative.    Neurological:  Positive for weakness.   Hematological:  Bruises/bleeds easily.   Psychiatric/Behavioral:  Positive for confusion.      Objective:     Vital Signs (Most Recent):  Temp: 98 °F (36.7 °C) (12/16/23 0800)  Pulse: 77 (12/16/23 0800)  Resp: 20 (12/16/23 0800)  BP: 115/71 (12/16/23 0800)  SpO2: 97 % (12/16/23 0800) Vital Signs (24h Range):  Temp:  [97.6 °F (36.4 °C)-98.3 °F (36.8 °C)] 98 °F (36.7 °C)  Pulse:  [51-83] 77  Resp:  [14-20] 20  SpO2:  [95 %-99 %] 97 %  BP: ()/(46-71) 115/71     Weight: 69.5 kg (153 lb 3.5 oz)  Body mass index is 25.5 kg/m².    Intake/Output Summary (Last 24 hours) at 12/16/2023 1241  Last data filed at 12/16/2023 0527  Gross per 24 hour   Intake 1138.34 ml   Output 2200 ml   Net -1061.66 ml         Physical Exam  Constitutional:       Appearance: Normal appearance. He is normal weight.   HENT:      Head: Normocephalic and atraumatic.      Nose: Nose normal.      Mouth/Throat:      Mouth: Mucous membranes are moist.      Pharynx: Oropharynx is clear.   Eyes:      Extraocular Movements: Extraocular movements intact.      Conjunctiva/sclera: Conjunctivae normal.      Pupils: Pupils are equal, round, and reactive to light.   Cardiovascular:      Rate and Rhythm: Normal rate and regular rhythm.      Pulses: Normal pulses.      Heart sounds: Normal heart sounds.   Pulmonary:      Effort: Pulmonary effort is normal.      Breath sounds: Normal breath sounds.   Abdominal:      General: Bowel sounds are normal.      Palpations: Abdomen is soft.   Musculoskeletal:          General: Normal range of motion.      Cervical back: Normal range of motion and neck supple.   Skin:     General: Skin is warm and dry.      Capillary Refill: Capillary refill takes more than 3 seconds.   Neurological:      General: No focal deficit present.      Mental Status: He is alert. Mental status is at baseline.   Psychiatric:         Mood and Affect: Mood normal.         Behavior: Behavior normal.         Thought Content: Thought content normal.         Judgment: Judgment normal.             Significant Labs: All pertinent labs within the past 24 hours have been reviewed.  BMP:   Recent Labs   Lab 12/16/23  0445      K 3.1*   CO2 22*   BUN 35.0*   CREATININE 1.46*   CALCIUM 7.1*   MG 1.80     CBC:   Recent Labs   Lab 12/14/23  1643 12/15/23  0502 12/16/23  0445   WBC 8.37 7.74 5.23   HGB 14.5 12.1* 8.4*   HCT 45.4 36.6* 25.7*    247 169     CMP:   Recent Labs   Lab 12/14/23  1643 12/15/23  0502 12/16/23  0445    139 140   K 4.8 3.8 3.1*   CO2 21* 20* 22*   BUN 33.0* 34.0* 35.0*   CREATININE 1.82* 1.80* 1.46*   CALCIUM 7.0* 7.1* 7.1*   ALBUMIN 1.2*  --  2.8*   BILITOT 0.2  --  0.5   ALKPHOS 76  --  39*   AST 32  --  15   ALT 19  --  8     Magnesium:   Recent Labs   Lab 12/16/23  0445   MG 1.80       Significant Imaging: I have reviewed all pertinent imaging results/findings within the past 24 hours.

## 2023-12-17 NOTE — PROGRESS NOTES
[facility]  [facility] Medicine  Progress Note    Patient Name: Supa Carmen  MRN: [id number]  Patient Class: IP- Inpatient   Admission Date: 12/14/2023  Length of Stay: 2 days  Attending Physician: Randy Evans DO  Primary Care Provider: Randy Evans DO        Subjective:     Principal Problem:Hypotension        HPI:  Patient is an 83y Male with history of Chron's disease, history of colon cancer, atrial fibrillation status post cardioversion who initially present to outpatient clinic on 12/14/23 for hypotension, edema, and general weakness.  On sotalol 120mg BID. Workup in ED unremarkable with the exception of an albumin of 1.2. Placed in observation overnight with albumin and lasix ordered to help with significant peripheral edema.      Overnight patient had minimal urine output. Bladder scan this morning demonstrated over 600ml of retained urine in bladder. Sandoval catheter placed. Patient and family state he has had very poor appetite in recent weeks and has been feeling progressively weaker. Will admit to inpatient for continued hypotension while medications are adjusted as well as to begin IV nutrition with plans for PEG tube placement early next week.     Overview/Hospital Course:  12/16/23-Patient seems to be a little better today as per family.  He required NS bolus, a total of 100g albumin and a dose of glucagon last night due to hypotension.  Today his H&H did drop and thus will get 1 unit PRBC.    12/17/23-Patient is eating more and feeling better.  Will start Megace today.  Plan was for PEG tomorrow but we will postpone procedure because he seems to be doing better.    Interval History:     Review of Systems   Constitutional:  Positive for activity change and fatigue.   HENT: Negative.     Eyes: Negative.    Respiratory: Negative.     Gastrointestinal: Negative.    Endocrine: Negative.    Genitourinary: Negative.    Musculoskeletal:  Positive for gait  problem.   Skin: Negative.    Allergic/Immunologic: Negative.    Neurological:  Positive for weakness.   Hematological: Negative.    Psychiatric/Behavioral:  Positive for confusion.      Objective:     Vital Signs (Most Recent):  Temp: 98.1 °F (36.7 °C) (12/17/23 1210)  Pulse: 84 (12/17/23 1210)  Resp: 20 (12/17/23 0807)  BP: 135/79 (12/17/23 1210)  SpO2: 96 % (12/17/23 1210) Vital Signs (24h Range):  Temp:  [97.3 °F (36.3 °C)-98.3 °F (36.8 °C)] 98.1 °F (36.7 °C)  Pulse:  [64-87] 84  Resp:  [18-21] 20  SpO2:  [94 %-98 %] 96 %  BP: ()/(60-79) 135/79     Weight: 69.5 kg (153 lb 3.5 oz)  Body mass index is 25.5 kg/m².    Intake/Output Summary (Last 24 hours) at 12/17/2023 1224  Last data filed at 12/17/2023 0609  Gross per 24 hour   Intake 290 ml   Output 600 ml   Net -310 ml         Physical Exam  Constitutional:       Appearance: Normal appearance. He is normal weight.   HENT:      Head: Normocephalic and atraumatic.      Nose: Nose normal.      Mouth/Throat:      Mouth: Mucous membranes are moist.      Pharynx: Oropharynx is clear.   Eyes:      Extraocular Movements: Extraocular movements intact.      Conjunctiva/sclera: Conjunctivae normal.      Pupils: Pupils are equal, round, and reactive to light.   Cardiovascular:      Rate and Rhythm: Normal rate and regular rhythm.      Pulses: Normal pulses.      Heart sounds: Normal heart sounds.   Pulmonary:      Effort: Pulmonary effort is normal.      Breath sounds: Normal breath sounds.   Abdominal:      General: Bowel sounds are normal.      Palpations: Abdomen is soft.   Musculoskeletal:         General: Normal range of motion.      Cervical back: Normal range of motion and neck supple.   Skin:     General: Skin is warm and dry.      Capillary Refill: Capillary refill takes 2 to 3 seconds.   Neurological:      General: No focal deficit present.      Mental Status: He is alert and oriented to person, place, and time. Mental status is at baseline.   Psychiatric:          Mood and Affect: Mood normal.         Behavior: Behavior normal.         Thought Content: Thought content normal.         Judgment: Judgment normal.             Significant Labs: All pertinent labs within the past 24 hours have been reviewed.  BMP:   Recent Labs   Lab 12/17/23 0448      K 3.0*   CO2 22*   BUN 28.0*   CREATININE 1.03   CALCIUM 7.1*   MG 1.80     CBC:   Recent Labs   Lab 12/16/23 0445 12/17/23 0448   WBC 5.23 4.71   HGB 8.4* 10.7*   HCT 25.7* 32.5*    165     CMP:   Recent Labs   Lab 12/16/23 0445 12/17/23 0448    142   K 3.1* 3.0*   CO2 22* 22*   BUN 35.0* 28.0*   CREATININE 1.46* 1.03   CALCIUM 7.1* 7.1*   ALBUMIN 2.8* 2.2*   BILITOT 0.5 0.5   ALKPHOS 39* 39*   AST 15 13   ALT 8 6     Magnesium:   Recent Labs   Lab 12/16/23 0445 12/17/23 0448   MG 1.80 1.80       Significant Imaging: I have reviewed all pertinent imaging results/findings within the past 24 hours.    Assessment/Plan:      * Hypotension  Will stop sotalol and provide rate control for atrial fibrillation with metoprolol tartrate beginning at 12.5mg BID  IV metoprolol ordered for PRN tachycardia.       Urinary retention  Sanodval placed. Will need Urology follow up at discharge.       Weakness  Likely due to sotalol dose being too high for patient as well as poor nutrition.     Will order Physical therapy and Occupational therapy.     Will likely need rolling walker and home health at discharge.       Severe protein-calorie malnutrition  Nutrition consulted. Most recent weight and BMI monitored-     Measurements:  Wt Readings from Last 1 Encounters:   12/15/23 70.7 kg (155 lb 13.8 oz)   Body mass index is 25.94 kg/m².    Patient to be screened and assessed by RD.    Albumin 1.2 with facial muscle wasting. Will consult RD and start on Clindamix. Plan to place PEG tube early next week.     Speech therapy consulted.       Bradycardia  Will stop sotalol.  Hold parameters for metoprolol.       A-fib  Status-post  cardioversion.    Will hold home sotalol and try to transition patient to metoprolol.       VTE Risk Mitigation (From admission, onward)           Ordered     enoxaparin injection 30 mg  Daily         12/14/23 2003     IP VTE HIGH RISK PATIENT  Once         12/14/23 2003     Place sequential compression device  Until discontinued         12/14/23 2003                  DVT prophylaxis  Follow labs  Replace K  Add Boost  Therapy  OOB  Clinimix  Discharge Planning   SRINI:      Code Status: Full Code   Is the patient medically ready for discharge?:     Reason for patient still in hospital (select all that apply): Patient trending condition, Laboratory test, Treatment, and PT / OT recommendations  Discharge Plan A: Home with family                  Antoine Rosa MD  Department of Hospital Medicine   [facility]

## 2023-12-17 NOTE — SUBJECTIVE & OBJECTIVE
Interval History:     Review of Systems   Constitutional:  Positive for activity change and fatigue.   HENT: Negative.     Eyes: Negative.    Respiratory: Negative.     Gastrointestinal: Negative.    Endocrine: Negative.    Genitourinary: Negative.    Musculoskeletal:  Positive for gait problem.   Skin: Negative.    Allergic/Immunologic: Negative.    Neurological:  Positive for weakness.   Hematological: Negative.    Psychiatric/Behavioral:  Positive for confusion.      Objective:     Vital Signs (Most Recent):  Temp: 98.1 °F (36.7 °C) (12/17/23 1210)  Pulse: 84 (12/17/23 1210)  Resp: 20 (12/17/23 0807)  BP: 135/79 (12/17/23 1210)  SpO2: 96 % (12/17/23 1210) Vital Signs (24h Range):  Temp:  [97.3 °F (36.3 °C)-98.3 °F (36.8 °C)] 98.1 °F (36.7 °C)  Pulse:  [64-87] 84  Resp:  [18-21] 20  SpO2:  [94 %-98 %] 96 %  BP: ()/(60-79) 135/79     Weight: 69.5 kg (153 lb 3.5 oz)  Body mass index is 25.5 kg/m².    Intake/Output Summary (Last 24 hours) at 12/17/2023 1224  Last data filed at 12/17/2023 0609  Gross per 24 hour   Intake 290 ml   Output 600 ml   Net -310 ml         Physical Exam  Constitutional:       Appearance: Normal appearance. He is normal weight.   HENT:      Head: Normocephalic and atraumatic.      Nose: Nose normal.      Mouth/Throat:      Mouth: Mucous membranes are moist.      Pharynx: Oropharynx is clear.   Eyes:      Extraocular Movements: Extraocular movements intact.      Conjunctiva/sclera: Conjunctivae normal.      Pupils: Pupils are equal, round, and reactive to light.   Cardiovascular:      Rate and Rhythm: Normal rate and regular rhythm.      Pulses: Normal pulses.      Heart sounds: Normal heart sounds.   Pulmonary:      Effort: Pulmonary effort is normal.      Breath sounds: Normal breath sounds.   Abdominal:      General: Bowel sounds are normal.      Palpations: Abdomen is soft.   Musculoskeletal:         General: Normal range of motion.      Cervical back: Normal range of motion and neck  supple.   Skin:     General: Skin is warm and dry.      Capillary Refill: Capillary refill takes 2 to 3 seconds.   Neurological:      General: No focal deficit present.      Mental Status: He is alert and oriented to person, place, and time. Mental status is at baseline.   Psychiatric:         Mood and Affect: Mood normal.         Behavior: Behavior normal.         Thought Content: Thought content normal.         Judgment: Judgment normal.             Significant Labs: All pertinent labs within the past 24 hours have been reviewed.  BMP:   Recent Labs   Lab 12/17/23 0448      K 3.0*   CO2 22*   BUN 28.0*   CREATININE 1.03   CALCIUM 7.1*   MG 1.80     CBC:   Recent Labs   Lab 12/16/23 0445 12/17/23 0448   WBC 5.23 4.71   HGB 8.4* 10.7*   HCT 25.7* 32.5*    165     CMP:   Recent Labs   Lab 12/16/23 0445 12/17/23 0448    142   K 3.1* 3.0*   CO2 22* 22*   BUN 35.0* 28.0*   CREATININE 1.46* 1.03   CALCIUM 7.1* 7.1*   ALBUMIN 2.8* 2.2*   BILITOT 0.5 0.5   ALKPHOS 39* 39*   AST 15 13   ALT 8 6     Magnesium:   Recent Labs   Lab 12/16/23 0445 12/17/23 0448   MG 1.80 1.80       Significant Imaging: I have reviewed all pertinent imaging results/findings within the past 24 hours.

## 2023-12-18 PROBLEM — R53.81 PHYSICAL DECONDITIONING: Status: ACTIVE | Noted: 2023-01-01

## 2023-12-18 NOTE — PT/OT/SLP PROGRESS
"         Physical Therapy Treatment Note           Name: Supa Carmen    : 1940 (83 y.o.)  MRN: [id number]             Subjective Assessment:     No complaints  Lethargic   X Awake, alert, cooperative  Uncooperative    Agitated  c/o pain    Appropriate  c/o fatigue   X Confused  Treated at bedside     Emotionally labile  Treated in gym/dept.    Impulsive  Other:    Flat affect       Pain/Comfort:    Pain Rating 1: 0/10    Therapy Precautions:     X Cognitive deficits  Spinal precautions    Collar - hard  Sternal precautions    Collar - soft   TLSO   X Fall risk  LSO    Hip precautions - posterior  Knee immobilizer    Hip precautions - anterior  WBAT    Impaired communication  Partial weightbearing    Oxygen  TTWB    PEG tube  NWB    Visual deficits  Other:    Hearing deficits            Mobility Training:     Assist Level Assistive Device Comments    Bed Mobility SBA  Semi-supine to sitting at EOB. Pt initially requested for PT to mobilize his legs toward the EOB. PT encouraged pt to perform this activity independently, despite pt stating "I can't.".  With increased time allowed and use of the bed railing for assistance, pt was able to mobilize into sitting with SBA only.   Sit to stand/Stand to sit SBA-CGA RW vs no AD Sit to stand/stand to sit performed from EOB and from WC level. When utilizing the RW, pt required cueing for hand placement.    Transfers      Gait CGA-Jaret RW vs no AD Without the RW, pt was able to ambulate 100 ft with several L sided LOBs experienced.  Jaret was required for maintenance of upright due to a L sided lean and general instability.  With use of the RW, balance improved and pt was able to ambulate 275 ft with CGA only.  Cues provided for visual scanning and upright posture.    Balance Training      Wheelchair Mobility      Stair Climbing      Car Transfer      Other:               Assessment:     Patient tolerated session fairly but did appear more confused and less steady as " compared to Friday's evaluation.  With use of the RW, pt's balance and overall activity tolerance did improve.  PEG placement being held at this time.  Pt will require 24 hour care and HH services upon D/C from this facility.        GOALS:   Multidisciplinary Problems       Physical Therapy Goals          Problem: Physical Therapy    Goal Priority Disciplines Outcome Goal Variances Interventions   Physical Therapy Goal     PT, PT/OT Ongoing, Progressing     Description: Patient will increase functional independence with mobility by performin. Sit to stand transfer with Modified Olden  2. Gait  x 300 feet with Modified Olden using RW vs No Assistive Device.   3. Ascend/descend 3 stair with left Handrails Supervision.                            Discharge Recommendations:      24 hour care and HH    Discharge Equipment Recommendations:     none     At end of treatment, patient remained:     X Up in chair     In bed   X With alarm activated    Bed rails up   X Call bell in reach      Family/friends present     Restraints secured properly     In bathroom with CNA/RN notified     In gym with PT/PTA/tech     Nurse aware     Other:           PT Start Time: 0853     PT Stop Time: 09  PT Total Time (min): 27 min     Billable Minutes: Gait Training 2023

## 2023-12-18 NOTE — H&P
[facility]  History & Physical    Patient Name: Supa Carmen  MRN: [id number]  Patient Class: IP- Swing  Admission Date: 12/18/2023  Attending Physician: Randy Evans DO   Primary Care Provider: Randy Evans DO         Patient information was obtained from patient, past medical records, and ER records.     Subjective:     Principal Problem:Physical deconditioning    Chief Complaint: weakness.       HPI: Patient is an 83y Male with history of Chron's disease, history of colon cancer, atrial fibrillation status post cardioversion who initially present to outpatient clinic on 12/14/23 for hypotension, edema, and general weakness.  On sotalol 120mg BID. Workup in ED unremarkable with the exception of an albumin of 1.2. Placed in observation overnight with albumin and lasix ordered to help with significant peripheral edema.       Overnight patient had minimal urine output. Bladder scan this morning demonstrated over 600ml of retained urine in bladder. Sandoval catheter placed. Patient and family state he has had very poor appetite in recent weeks and has been feeling progressively weaker. Will admit to inpatient for continued hypotension while medications are adjusted as well as to begin IV nutrition with plans for PEG tube placement early next week.     The patient will be admitted to swing bed today for continued therapy.  PEG tube insertion was postponed due to the patient's improvement.  He is now eating more and I placed him on Megace as well.  Currently he is just weak and requires more therapy.  No chest pain/SOB.  No fever/chills.  No N/V/D/C.  He will likely need some scheduled metoprolol but his pressures were on the low side so we are still monitoring especially since his previous dose of sotalol was too high.      Past Medical History:   Diagnosis Date    Atrial fibrillation     CAD (coronary artery disease)     Chemotherapy-induced neuropathy      Diverticulosis     DJD (degenerative joint disease)     Gout     History of colon cancer, stage III 05/20/2011    HLD (hyperlipidemia)     HTN (hypertension)     Personal history of colonic polyps 10/24/2017    s/p polypectomy - Dr Kenny Vargas       Past Surgical History:   Procedure Laterality Date    APPENDECTOMY      CARDIAC CATHETERIZATION  10/14/2009    Dr Tim Bryan    COLONOSCOPY N/A 06/27/2023    Procedure: COLONOSCOPY;  Surgeon: Kenny Vargas MD;  Location: [facility];  Service: Gastroenterology;  Laterality: N/A;    COLONOSCOPY W/ BIOPSIES AND POLYPECTOMY  05/30/2012    3mm polyp - Dr Kenny Vargas    COLONOSCOPY W/ BIOPSIES AND POLYPECTOMY  10/24/2017    2 polyps - Dr Kenny Vargas    COLONOSCOPY W/ BIOPSIES AND POLYPECTOMY  07/22/2014    2 polyps - Dr Kenny Vargas    HEMICOLECTOMY, RIGHT, LAPAROSCOPIC  05/20/2011    Dr Elijah Huntley    LUMBAR DISCECTOMY      MEDIPORT INSERTION, SINGLE  06/16/2011    Dr Elijah Huntley       Review of patient's allergies indicates:  No Known Allergies    Current Facility-Administered Medications on File Prior to Encounter   Medication    [COMPLETED] potassium chloride SA CR tablet 40 mEq    [DISCONTINUED] 0.9%  NaCl infusion (for blood administration)    [DISCONTINUED] acetaminophen tablet 650 mg    [DISCONTINUED] Amino acid 4.25% - dextrose 5% (CLINIMIX-E) solution (1L provides 42.5 gm AA, 50 gm CHO (170 kcal/L dextrose), Na 35, K 30, Mg 5, Ca 4.5, Acetate 70, Cl 39, Phos 15)    [DISCONTINUED] Amino acid 4.25% - dextrose 5% (CLINIMIX-E) solution (1L provides 42.5 gm AA, 50 gm CHO (170 kcal/L dextrose), Na 35, K 30, Mg 5, Ca 4.5, Acetate 70, Cl 39, Phos 15)    [DISCONTINUED] enoxaparin injection 30 mg    [DISCONTINUED] gabapentin capsule 300 mg    [DISCONTINUED] megestroL 400 mg/10 mL (10 mL) suspension 200 mg    [DISCONTINUED] melatonin tablet 6 mg    [DISCONTINUED] metoprolol injection 5 mg    [DISCONTINUED] mupirocin 2 % ointment     [DISCONTINUED] ondansetron injection 4 mg    [DISCONTINUED] sodium chloride 0.9% flush 10 mL    [DISCONTINUED] sucralfate tablet 1 g     Current Outpatient Medications on File Prior to Encounter   Medication Sig    apixaban (ELIQUIS) 5 mg Tab Take 1 tablet (5 mg total) by mouth 2 (two) times a day.    gabapentin (NEURONTIN) 300 MG capsule Take 1 capsule (300 mg total) by mouth 3 (three) times daily. (Patient taking differently: Take 300 mg by mouth daily as needed.)    multivitamin (THERAGRAN) per tablet Take 1 tablet by mouth once daily.    sotaloL (BETAPACE) 120 MG Tab Take 120 mg by mouth 2 (two) times daily.     Family History       Problem Relation (Age of Onset)    No Known Problems Mother    Other Father (42)          Tobacco Use    Smoking status: Former     Types: Cigarettes    Smokeless tobacco: Never   Substance and Sexual Activity    Alcohol use: Not Currently    Drug use: Never    Sexual activity: Not Currently     Review of Systems   Constitutional:  Positive for activity change and fatigue.   HENT: Negative.     Eyes: Negative.    Respiratory: Negative.     Cardiovascular: Negative.    Gastrointestinal: Negative.    Endocrine: Negative.    Genitourinary: Negative.    Musculoskeletal:  Positive for arthralgias and gait problem.   Skin: Negative.    Allergic/Immunologic: Negative.    Neurological:  Positive for weakness.   Hematological: Negative.    Psychiatric/Behavioral:  Positive for confusion.      Objective:     Vital Signs (Most Recent):    Vital Signs (24h Range):  Temp:  [97.8 °F (36.6 °C)-98.2 °F (36.8 °C)] 97.8 °F (36.6 °C)  Pulse:  [70-92] 92  Resp:  [16-22] 20  SpO2:  [95 %-98 %] 95 %  BP: (125-143)/(77-84) 142/84        There is no height or weight on file to calculate BMI.     Physical Exam  Constitutional:       Appearance: Normal appearance. He is normal weight.   HENT:      Head: Normocephalic and atraumatic.      Nose: Nose normal.      Mouth/Throat:      Mouth: Mucous membranes are  moist.      Pharynx: Oropharynx is clear.   Eyes:      Extraocular Movements: Extraocular movements intact and EOM normal.      Conjunctiva/sclera: Conjunctivae normal.      Pupils: Pupils are equal, round, and reactive to light.   Cardiovascular:      Rate and Rhythm: Normal rate and regular rhythm.      Pulses: Normal pulses.      Heart sounds: Normal heart sounds.   Pulmonary:      Effort: Pulmonary effort is normal.      Breath sounds: Normal breath sounds.   Abdominal:      General: Bowel sounds are normal.      Palpations: Abdomen is soft.   Musculoskeletal:         General: Normal range of motion.      Cervical back: Normal range of motion and neck supple.   Skin:     General: Skin is warm and dry.      Capillary Refill: Capillary refill takes 2 to 3 seconds.   Neurological:      General: No focal deficit present.      Mental Status: He is alert. Mental status is at baseline.   Psychiatric:         Attention and Perception: Attention normal.         Mood and Affect: Mood normal.         Speech: Speech normal.         Behavior: Behavior normal. Behavior is cooperative.         Thought Content: Thought content normal.         Cognition and Memory: Cognition is impaired. Memory is impaired.              CRANIAL NERVES     CN I  cranial nerve I not tested    CN II   Visual fields full to confrontation.     CN III, IV, VI   Pupils are equal, round, and reactive to light.  Extraocular motions are normal.   CN III: no CN III palsy  CN VI: no CN VI palsy    CN V   Facial sensation intact.     CN VII   Facial expression full, symmetric.     CN VIII   CN VIII normal.     CN IX, X   CN IX normal.   CN X normal.     CN XI   CN XI normal.     CN XII   CN XII normal.        Significant Labs: All pertinent labs within the past 24 hours have been reviewed.  BMP:   Recent Labs   Lab 12/18/23  0445      K 3.6   CO2 21*   BUN 23.0   CREATININE 0.85   CALCIUM 7.2*   MG 1.80     CBC:   Recent Labs   Lab 12/17/23  2030  12/18/23  0445   WBC 4.71 6.33   HGB 10.7* 12.4*   HCT 32.5* 37.5*    156     CMP:   Recent Labs   Lab 12/17/23  0448 12/18/23  0445    139   K 3.0* 3.6   CO2 22* 21*   BUN 28.0* 23.0   CREATININE 1.03 0.85   CALCIUM 7.1* 7.2*   ALBUMIN 2.2* 2.0*   BILITOT 0.5 0.4   ALKPHOS 39* 37*   AST 13 15   ALT 6 8     Magnesium:   Recent Labs   Lab 12/17/23 0448 12/18/23 0445   MG 1.80 1.80       Significant Imaging: I have reviewed all pertinent imaging results/findings within the past 24 hours.  Assessment/Plan:     * Physical deconditioning  Admit to swing bed  OOB  Therapy  Resume transfer meds  Labs as needed      Severe protein-calorie malnutrition  Nutrition consulted. Most recent weight and BMI monitored-     Measurements:  Wt Readings from Last 1 Encounters:   12/18/23 70.3 kg (154 lb 15.7 oz)   There is no height or weight on file to calculate BMI.    Patient has been screened and assessed by RD.    Malnutrition Type:  Context:    Level:      Malnutrition Characteristic Summary:       Interventions/Recommendations (treatment strategy):    Continue Clinimix and megace         Hypotension  Closely monitor        VTE Risk Mitigation (From admission, onward)           Ordered     enoxaparin injection 30 mg  Daily         12/18/23 1422     IP VTE HIGH RISK PATIENT  Once         12/18/23 1422     Place sequential compression device  Until discontinued         12/18/23 1422                                    Antoine Rosa MD  Department of Hospital Medicine  [facility]

## 2023-12-18 NOTE — PLAN OF CARE
12/18/23 1222   Final Note   Assessment Type Final Discharge Note   Anticipated Discharge Disposition SNF  ([facility])   What phone number can be called within the next 1-3 days to see how you are doing after discharge? [phone]   Post-Acute Status   Post-Acute Authorization   (Medicare- According to [place] in admitting patient has 20 skilled days available)   Coverage Medicare   Discharge Delays None known at this time

## 2023-12-18 NOTE — PROGRESS NOTES
Inpatient Nutrition Assessment    Admit Date: 12/14/2023   Total duration of encounter: 4 days   Patient Age: 83 y.o.    Nutrition Recommendation/Prescription     Clinimix recs:  Clinimix 4.25/5 E @ 50 ml/hr to provide 408 kcal (24% est kcal needs) and 51 g Pro (60% pro needs).  If pt able to tolerate higher fluid volume, increase Clinimix 4.25/5 E to 85 ml/hr to provide 694 kcal, 87 g Pro, GIR 1.0.  This will meet 41% kcal needs, 100% protein needs.  Monitor for signs of refeeding (Glu, Mag, Phos) and replace lytes as needed.    Continue current diet as tolerated - honor food prefs as able  Will provide Magic Cup BID (290 kcal and 9 gm protein per serving)  When PEG placed, consult RD for tube feeding recs      Communication of Recommendations: reviewed with nurse, reviewed with patient, and reviewed with family    Nutrition Assessment     Malnutrition Assessment/Nutrition-Focused Physical Exam    Malnutrition Context: chronic illness (12/18/23 1008)  Malnutrition Level: severe (12/18/23 1008)  Energy Intake (Malnutrition): less than or equal to 50% for greater than or equal to 1 month (12/18/23 1008)  Weight Loss (Malnutrition): greater than 10% in 6 months (12/18/23 1008)  Subcutaneous Fat (Malnutrition): moderate depletion (12/18/23 1008)           Muscle Mass (Malnutrition): moderate depletion (12/18/23 1008)                          Fluid Accumulation (Malnutrition): moderate to severe (unable to assess) (12/15/23 1610)        A minimum of two characteristics is recommended for diagnosis of either severe or non-severe malnutrition.    Chart Review    Reason Seen: follow-up    Malnutrition Screening Tool Results   Have you recently lost weight without trying?: No  Have you been eating poorly because of a decreased appetite?: No   MST Score: 0   Diagnosis:  Hypotension, Severe protein-calorie malnutrition    Relevant Medical History: Chron's disease, history of colon cancer, atrial fibrillation status post  cardioversion   Past Medical History:   Diagnosis Date    Atrial fibrillation     CAD (coronary artery disease)     Chemotherapy-induced neuropathy     Diverticulosis     DJD (degenerative joint disease)     Gout     History of colon cancer, stage III 05/20/2011    HLD (hyperlipidemia)     HTN (hypertension)     Personal history of colonic polyps 10/24/2017    s/p polypectomy - Dr Kenny Vargas         Scheduled Medications:  enoxparin, 30 mg, Daily  megestroL, 200 mg, Daily  sucralfate, 1 g, QID (AC & HS)    Continuous Infusions:  Amino acid 4.25% - dextrose 5% (CLINIMIX-E) solution (1L provides 42.5 gm AA, 50 gm CHO (170 kcal/L dextrose), Na 35, K 30, Mg 5, Ca 4.5, Acetate 70, Cl 39, Phos 15), Last Rate: 50 mL/hr at 12/17/23 2331    PRN Medications: 0.9%  NaCl infusion (for blood administration), acetaminophen, gabapentin, melatonin, metoprolol, ondansetron, sodium chloride 0.9%    Calorie Containing IV Medications: no significant kcals from medications at this time    Recent Labs   Lab 12/14/23  1643 12/15/23  0502 12/16/23  0445 12/17/23  0448 12/18/23  0445    139 140 142 139   K 4.8 3.8 3.1* 3.0* 3.6   CALCIUM 7.0* 7.1* 7.1* 7.1* 7.2*   PHOS  --   --  3.5  --   --    MG  --   --  1.80 1.80 1.80   CHLORIDE 111* 112* 112* 113* 112*   CO2 21* 20* 22* 22* 21*   BUN 33.0* 34.0* 35.0* 28.0* 23.0   CREATININE 1.82* 1.80* 1.46* 1.03 0.85   EGFRNORACEVR 36 37 47 >60 >60   GLUCOSE 85 112 91 80* 101   BILITOT 0.2  --  0.5 0.5 0.4   ALKPHOS 76  --  39* 39* 37*   ALT 19  --  8 6 8   AST 32  --  15 13 15   ALBUMIN 1.2*  --  2.8* 2.2* 2.0*   WBC 8.37 7.74 5.23 4.71 6.33   HGB 14.5 12.1* 8.4* 10.7* 12.4*   HCT 45.4 36.6* 25.7* 32.5* 37.5*     Nutrition Orders:  Diet heart healthy  Amino acid 4.25% - dextrose 5% (CLINIMIX-E) solution (1L provides 42.5 gm AA, 50 gm CHO (170 kcal/L dextrose), Na 35, K 30, Mg 5, Ca 4.5, Acetate 70, Cl 39, Phos 15)      Appetite/Oral Intake: poor/0-25% of meals  Factors Affecting  "Nutritional Intake: decreased appetite  Food/Samaritan/Cultural Preferences: none reported  Food Allergies: none reported  Last Bowel Movement: 23  Wound(s):     Altered Skin Integrity 23 Right anterior;lower Arm Skin Tear Partial thickness tissue loss. Shallow open ulcer with a red or pink wound bed, without slough. Intact or Open/Ruptured Serum-filled blister.-Tissue loss description: Partial thickness     Comments    12/15:  Received consult for Clinimix recs.  Noted per chart review patient with decreased po intake for several weeks and has been getting weaker.  Plan per MD notes is for PEG tube placement next week.  Reviewed chart hx and noted pt with significant wt loss since last admit in  but wt has been stable since September.  Labs / meds reviewed.  Unable to complete NFPE at this time, will attempt on f/up.    : Pt with improvement (tolerated) over weekend. Consumed 0-25% of meals. PEG was planned for today, however, has been put on hold due to improvement. Family did report pt had poor appetite and intake for weeks PTA. He would only take a couple of bites of food and say he had no appetite. He denied N/V/C/D. Able to feed self with set-up assist. Son stated that he was weighing 200# about 6-7 months ago which is indicative of a weight loss of 46# (23%)-severe.     Anthropometrics    Height: 5' 5" (165.1 cm), Height Method: Stated  Last Weight: 70.3 kg (154 lb 15.7 oz) (23 0612), Weight Method: Bed Scale  BMI (Calculated): 25.8  BMI Classification: overweight (BMI 25-29.9)        Ideal Body Weight (IBW), Male: 136 lb     % Ideal Body Weight, Male (lb): 114.13 %                 Usual Body Weight (UBW), k.3 kg  % Usual Body Weight: 91.65     Usual Weight Provided By: EMR weight history and previous RD note    Wt Readings from Last 5 Encounters:   23 70.3 kg (154 lb 15.7 oz)   23 71.2 kg (157 lb)   23 71.2 kg (156 lb 15.5 oz)   23 72.1 kg " (159 lb)   09/14/23 71.5 kg (157 lb 10.1 oz)     Weight Change(s) Since Admission: 12/15: new admit  Wt Readings from Last 1 Encounters:   12/18/23 0612 70.3 kg (154 lb 15.7 oz)   12/17/23 0516 69.5 kg (153 lb 3.5 oz)   12/16/23 0500 69.5 kg (153 lb 3.5 oz)   12/15/23 0527 70.7 kg (155 lb 13.8 oz)   12/14/23 2000 70.4 kg (155 lb 3.3 oz)   12/14/23 1627 71.2 kg (157 lb)   Admit Weight: 71.2 kg (157 lb) (12/14/23 1627), Weight Method: Stated    Estimated Needs    Weight Used For Calorie Calculations: 70.7 kg (155 lb 13.8 oz)  Energy Calorie Requirements (kcal): 5397-6383 kcal/d (25 kcal/kg)  Energy Need Method: Kcal/kg  Weight Used For Protein Calculations: 70.7 kg (155 lb 13.8 oz)  Protein Requirements:  g Pro/d (1.3-1.5 g/kg)  Fluid Requirements (mL): 1800 ml/d (1ml/kcal)    Enteral Nutrition     Patient not receiving enteral nutrition at this time.    Parenteral Nutrition     Standard Formula: Clinimix E 4.25/5  Custom Formula: not applicable  Additives: none  Rate/Volume: 50 ml/hr  Lipids: none  Total Nutrition Provided by Parenteral Nutrition:  Calories Provided  408 kcal/d, 24% needs   Protein Provided  51 g/d, 60% needs   Dextrose Provided  60 g/d, GIR 0.6 mg CHO/kg/min   Fluid Provided  1200 ml/d, 67% needs       Evaluation of Received Nutrient Intake    Calories: not meeting estimated needs  Protein: not meeting estimated needs    Patient Education     Not applicable.    Nutrition Diagnosis     PES: Inadequate oral intake related to chronic illness as evidenced by predicted suboptimal energy intake (<50% of estimated needs met). (active)   PES: Severe chronic disease or condition related malnutrition related to inability to consume sufficient nutrients as evidenced by less than or equal to 50% needs met for greater than or equal to 1 month, moderate fat depletion, moderate muscle depletion, and greater than 10% weight loss in 6 months. (new)       Nutrition Interventions     Intervention(s):  general/healthful diet, modified composition of parenteral nutrition, modified rate of parenteral nutrition, and collaboration with other providers    Goal: Meet greater than 80% of nutritional needs by follow-up. (goal progressing)  Goal: Maintain weight throughout hospitalization. (goal progressing)    Nutrition Goals & Monitoring     Dietitian will monitor: food and beverage intake, parenteral nutrition intake, weight, electrolyte/renal panel, and glucose/endocrine profile    Nutrition Risk/Follow-Up: high (follow-up in 1-4 days)   Please consult if re-assessment needed sooner.

## 2023-12-18 NOTE — PT/OT/SLP EVAL
Occupational Therapy   Acute Care Evaluation    Name: Supa Carmen  MRN: [id number]  Admitting Diagnosis:  Hypotension      History:   As per chart review:  Patient is an 83y Male with history of Chron's disease, history of colon cancer, atrial fibrillation status post cardioversion who initially present to outpatient clinic on 12/14/23 for hypotension, edema, and general weakness.  On sotalol 120mg BID. Workup in ED unremarkable with the exception of an albumin of 1.2. Placed in observation overnight with albumin and lasix ordered to help with significant peripheral edema.       Overnight patient had minimal urine output. Bladder scan this morning demonstrated over 600ml of retained urine in bladder. Sandoval catheter placed. Patient and family state he has had very poor appetite in recent weeks and has been feeling progressively weaker. Will admit to inpatient for continued hypotension while medications are adjusted as well as to begin IV nutrition with plans for PEG tube placement early next week.     Past Medical History:   Diagnosis Date    Atrial fibrillation     CAD (coronary artery disease)     Chemotherapy-induced neuropathy     Diverticulosis     DJD (degenerative joint disease)     Gout     History of colon cancer, stage III 05/20/2011    HLD (hyperlipidemia)     HTN (hypertension)     Personal history of colonic polyps 10/24/2017    s/p polypectomy - Dr Kenny Vargas         Past Surgical History:   Procedure Laterality Date    APPENDECTOMY      CARDIAC CATHETERIZATION  10/14/2009    Dr Tim Bryan    COLONOSCOPY N/A 06/27/2023    Procedure: COLONOSCOPY;  Surgeon: Kenny Vargas MD;  Location: [facility];  Service: Gastroenterology;  Laterality: N/A;    COLONOSCOPY W/ BIOPSIES AND POLYPECTOMY  05/30/2012    3mm polyp - Dr Kenny Vargas    COLONOSCOPY W/ BIOPSIES AND POLYPECTOMY  10/24/2017    2 polyps - Dr Kenny Vargas    COLONOSCOPY W/ BIOPSIES AND POLYPECTOMY  07/22/2014    2 polyps -   "Kenny Vargas    HEMICOLECTOMY, RIGHT, LAPAROSCOPIC  05/20/2011    Dr Elijah Huntley    LUMBAR DISCECTOMY      MEDIPORT INSERTION, SINGLE  06/16/2011    Dr Elijah Huntley         Subjective     Chief Complaint: Pain at IV sight  Patient/Family Comments/goals: "to go home."    Occupational Profile:  Lives with: Wife  Home Environment: [facility] 3 steps to enter with rail on left.  Previous level of function: Pt mod I most ADLs, was getting assistance from wife with LE dressing.  Functional mobility wihtout assistive device but did report holding onto furniture and walls.  Equipment Used at Home:  walker, rolling      Objective:     Communicated with: PT prior to session.  Patient found supine with bed alarm, peripheral IV, kahn catheter, telemetry upon OT entry to room.    General Precautions: Standard, fall   Orthopedic Precautions:Full weight bearing   Braces: N/A  Respiratory Status: Room air    Occupational Performance:    Mobility  Assist level Comments    Bed mobility Mod  Pt reported "I flakita't move my legs",but was able to slide BLE off bed with minimal tactile guidance.  Mod assist with upper trunk using bed rail. Transfer sit to supine min assist with BLEs   Balance training     Transfer     Functional mobility     Sit to stand transitions Min Transfer training sit to stand from bed min tactile assist then pt able to side step 3 feet to left min assist for balance.   Other:         Activities of Daily Living Assist level Comments    Feeding     Grooming/hygiene SBA ADL grooming assessment; after setup pt able to wash face, perform oral care and hair grooming with 2 verbal cues for sequencing.   Bathing Mod ADL bathing assessment sitting at EOB for sponge bath.  After setup pt able to bathe trunk, BUEs, and thighs with verbal cuing.  Assist required with remaining.   Upper body dressing Min Tie gown.   Lower body dressing     Toileting Dep Kahn cath   Toilet transfer     Adaptive equipment training   "   Other:       Upper Extremity Strength:  Right Upper Extremity: WFL except decreased shoulder AROM and strength 3/5. Noted pt with stage 3 pitting edema in forearm and hand secondary to prior infitrated IV.  Left Upper Extremity: WFL except decreased shoulder AROM and strength 3+/5, remaining joints WFL    Cognitive/Visual Perceptual:  Cognitive/Psychosocial Skills:     -       Oriented to: Person and Place   -       Follows Commands/attention:Follows one-step commands  -       Communication: Very Ione  -       Memory: Impaired STM and Impaired LTM  -       Safety awareness/insight to disability: impaired   -       Mood/Affect/Coping skills/emotional control: Cooperative, Flat affect, and Pleasant    Treatment & Education:  Performed ADL assessment, see above grid.  Discussed scpoe and goals of OT and POC.  Educated pt on call bell function and call before you fall.      Assessment:     Supa Carmen is a 83 y.o. male with a medical diagnosis of Hypotension.  He presents with decreased functional transfers, ADLs and cognition secondary to recent illness. Performance deficits affecting function: weakness, gait instability, decreased ROM, impaired endurance, impaired balance, impaired self care skills, decreased safety awareness, impaired cognition, edema.      Rehab Prognosis: Good; patient would benefit from acute skilled OT services to address these deficits and reach maximum level of function.     Pain/Comfort:  Pain Rating 1: 3/10  Location - Side 1: Right  Location 1: hip  Pain Addressed 1: Reposition, Distraction    Blood Pressure:        Plan:     Patient to be seen 5 x/week to address the above listed problems via self-care/home management, therapeutic activities, therapeutic exercises  Plan of Care Reviewed with: patient      GOALS:   Multidisciplinary Problems       Occupational Therapy Goals          Problem: Occupational Therapy    Goal Priority Disciplines Outcome Interventions   Occupational Therapy  Goal     OT, PT/OT     Description: Patient will increase functional independence with ADLs by performing:    LE Dressing with Contact Guard Assistance.  Toileting from toilet with Stand-by Assistance for hygiene and clothing management.   Bathing from  shower chair/bench with Minimal Assistance.  Toilet transfer to toilet with Stand-by Assistance.                             Patient left supine with all lines intact, call button in reach, and bed alarm on      Recommendations:     Discharge Recommendations:    Discharge Equipment Recommendations:  shower chair  Barriers to discharge:  Decreased caregiver support      Time Tracking:     OT Date of Treatment: 12/18/23  OT Start Time: 0750  OT Stop Time: 0830  OT Total Time (min): 40 min    Billable Minutes:Evaluation 20  Self Care/Home Management 20      12/18/2023

## 2023-12-18 NOTE — PLAN OF CARE
Problem: Adult Inpatient Plan of Care  Goal: Plan of Care Review  Outcome: Ongoing, Progressing  Goal: Patient-Specific Goal (Individualized)  Outcome: Ongoing, Progressing  Goal: Absence of Hospital-Acquired Illness or Injury  Outcome: Ongoing, Progressing  Goal: Optimal Comfort and Wellbeing  Outcome: Ongoing, Progressing  Goal: Readiness for Transition of Care  Outcome: Ongoing, Progressing     Problem: Impaired Wound Healing  Goal: Optimal Wound Healing  Outcome: Ongoing, Progressing     Problem: Hypertension Comorbidity  Goal: Blood Pressure in Desired Range  Outcome: Ongoing, Progressing     Problem: Pain Acute  Goal: Acceptable Pain Control and Functional Ability  Outcome: Ongoing, Progressing     Problem: Fall Injury Risk  Goal: Absence of Fall and Fall-Related Injury  Outcome: Ongoing, Progressing     Problem: Fatigue  Goal: Improved Activity Tolerance  Outcome: Ongoing, Progressing     Problem: Dysrhythmia  Goal: Normalized Cardiac Rhythm  Outcome: Ongoing, Progressing     Problem: Fluid Volume Excess  Goal: Fluid Balance  Outcome: Ongoing, Progressing     Problem: Chest Pain  Goal: Resolution of Chest Pain Symptoms  Outcome: Ongoing, Progressing     Problem: Skin Injury Risk Increased  Goal: Skin Health and Integrity  Outcome: Ongoing, Progressing     Problem: Malnutrition  Goal: Improved Nutritional Intake  Outcome: Ongoing, Progressing

## 2023-12-18 NOTE — HPI
Patient is an 83y Male with history of Chron's disease, history of colon cancer, atrial fibrillation status post cardioversion who initially present to outpatient clinic on 12/14/23 for hypotension, edema, and general weakness.  On sotalol 120mg BID. Workup in ED unremarkable with the exception of an albumin of 1.2. Placed in observation overnight with albumin and lasix ordered to help with significant peripheral edema.       Overnight patient had minimal urine output. Bladder scan this morning demonstrated over 600ml of retained urine in bladder. Sandoval catheter placed. Patient and family state he has had very poor appetite in recent weeks and has been feeling progressively weaker. Will admit to inpatient for continued hypotension while medications are adjusted as well as to begin IV nutrition with plans for PEG tube placement early next week.     The patient will be admitted to swing bed today for continued therapy.  PEG tube insertion was postponed due to the patient's improvement.  He is now eating more and I placed him on Megace as well.  Currently he is just weak and requires more therapy.  No chest pain/SOB.  No fever/chills.  No N/V/D/C.  He will likely need some scheduled metoprolol but his pressures were on the low side so we are still monitoring especially since his previous dose of sotalol was too high.

## 2023-12-18 NOTE — PT/OT/SLP DISCHARGE
Speech Language Pathology Discharge Summary    Supa Carmen  MRN: [id number]   Hypotension     Date of Last Treatment Session: today 23    Past Medical History:   Diagnosis Date    Atrial fibrillation     CAD (coronary artery disease)     Chemotherapy-induced neuropathy     Diverticulosis     DJD (degenerative joint disease)     Gout     History of colon cancer, stage III 2011    HLD (hyperlipidemia)     HTN (hypertension)     Personal history of colonic polyps 10/24/2017    s/p polypectomy - Dr Kenny Vargas       Status at initiation of therapy: swallow WNL; SLP to rule out dysphagia as reason for reduced intake    Treatment Area(s):  Swallow    Goals:   Multidisciplinary Problems       SLP Goals       Not on file              Multidisciplinary Problems (Resolved)          Problem: SLP    Goal Priority Disciplines Outcome   SLP Goal   (Resolved)     SLP Met   Description: LT. Pt will tolerate regular consistency diet without s/s aspiration and improved oral intake.     STGs:  1. Pt will tolerate regular consistency diet with thin liquids without overt s/s aspiration and without c/o stuck sensation.                        Participation in Treatment (at discharge):  Cooperative    Functional Status at time of Discharge:    Cognition: Patient demonstrates no cognitive deficits.    Communication: Patient demonstrates no communication deficits.    Language: Patient demonstrates no language deficits.    Motor Speech: Patient demonstrates no motor speech deficits.    Swallow: Patient demonstrates no dysphagia.                     Clinical Bedside assessment was completed on 12/15/23                       Instrumental assessment was not completed                                Swallowing Guidelines: none    Patient is discharged to  Pt transitioning to swing bed at this facility. Increased confusion is noted by staff today. SLP to evaluate Pt for cognitive changes under swing bed admit.                Anjelica Pantoja MA, CCC-SLP  12/18/2023

## 2023-12-18 NOTE — DISCHARGE SUMMARY
[facility]  [facility] Medicine  Discharge Summary      Patient Name: Supa Carmen  MRN: [id number]  JETHRO: [id number]  Patient Class: IP- Inpatient  Admission Date: 12/14/2023  Hospital Length of Stay: 3 days  Discharge Date and Time:  12/18/2023 12:08 PM  Attending Physician: Randy Evans DO   Discharging Provider: Antoine Rosa MD  Primary Care Provider: Randy Evans DO    Primary Care Team: Networked reference to record PCT     HPI:   Patient is an 83y Male with history of Chron's disease, history of colon cancer, atrial fibrillation status post cardioversion who initially present to outpatient clinic on 12/14/23 for hypotension, edema, and general weakness.  On sotalol 120mg BID. Workup in ED unremarkable with the exception of an albumin of 1.2. Placed in observation overnight with albumin and lasix ordered to help with significant peripheral edema.      Overnight patient had minimal urine output. Bladder scan this morning demonstrated over 600ml of retained urine in bladder. Sandoval catheter placed. Patient and family state he has had very poor appetite in recent weeks and has been feeling progressively weaker. Will admit to inpatient for continued hypotension while medications are adjusted as well as to begin IV nutrition with plans for PEG tube placement early next week.     * No surgery found *      Hospital Course:   12/16/23-Patient seems to be a little better today as per family.  He required NS bolus, a total of 100g albumin and a dose of glucagon last night due to hypotension.  Today his H&H did drop and thus will get 1 unit PRBC.    12/17/23-Patient is eating more and feeling better.  Will start Megace today.  Plan was for PEG tomorrow but we will postpone procedure because he seems to be doing better.    12/18/23-Patient will be transitioned to swing bed today for continued therapy.  Overall he is improving.  Exam(Alert, NAD, RRR, CTA, BS +, some  "edema, ROM I)     Goals of Care Treatment Preferences:  Code Status: Full Code      Consults:   Consults (From admission, onward)          Status Ordering Provider     Inpatient consult to Registered Dietitian/Nutritionist  Once        Provider:  (Not yet assigned)    Completed JAYCE ECHEVARRIA            No new Assessment & Plan notes have been filed under this hospital service since the last note was generated.  Service: Hospital Medicine    Final Active Diagnoses:    Diagnosis Date Noted POA    PRINCIPAL PROBLEM:  Hypotension [I95.9] 07/07/2023 Yes    Anasarca [R60.1] 12/15/2023 Yes    Bradycardia [R00.1] 12/15/2023 Yes    Severe protein-calorie malnutrition [E43] 12/15/2023 Yes    Weakness [R53.1] 12/15/2023 Yes    Urinary retention [R33.9] 12/15/2023 Yes    Crohn's disease of both small and large intestine without complication [K50.80] 07/03/2023 Yes     Chronic    A-fib [I48.91] 06/29/2023 Yes    History of colon cancer [Z85.038] 02/16/2023 Yes      Problems Resolved During this Admission:       Discharged Condition: stable    Disposition: Swing Bed    Follow Up:    Patient Instructions:      WALKER FOR HOME USE     Order Specific Question Answer Comments   Type of Walker: Adult (5'4"-6'6")    With wheels? Yes    Height: 5' 5" (1.651 m)    Weight: 70.7 kg (155 lb 13.8 oz)    Length of need (1-99 months): 99    Does patient have medical equipment at home? blood pressure machine    Please check all that apply: Patient's condition impairs ambulation.    Please check all that apply: Patient is unable to safely ambulate without equipment.    Please check all that apply: Patient needs help to get in and out of chair.      Ambulatory referral/consult to Home Health   Referral Priority: Routine Referral Type: Home Health   Referral Reason: Specialty Services Required   Requested Specialty: Home Health Services   Number of Visits Requested: 1       Significant Diagnostic Studies: Labs: BMP:   Recent Labs   Lab " 12/17/23  0448 12/18/23  0445    139   K 3.0* 3.6   CO2 22* 21*   BUN 28.0* 23.0   CREATININE 1.03 0.85   CALCIUM 7.1* 7.2*   MG 1.80 1.80   , CMP   Recent Labs   Lab 12/17/23  0448 12/18/23  0445    139   K 3.0* 3.6   CO2 22* 21*   BUN 28.0* 23.0   CREATININE 1.03 0.85   CALCIUM 7.1* 7.2*   ALBUMIN 2.2* 2.0*   BILITOT 0.5 0.4   ALKPHOS 39* 37*   AST 13 15   ALT 6 8   , and CBC   Recent Labs   Lab 12/17/23 0448 12/18/23 0445   WBC 4.71 6.33   HGB 10.7* 12.4*   HCT 32.5* 37.5*    156       Pending Diagnostic Studies:       None           Medications:  Transfer Medications (for Discharge Readmit only):   Current Facility-Administered Medications   Medication Dose Route Frequency Provider Last Rate Last Admin    0.9%  NaCl infusion (for blood administration)   Intravenous Q24H PRN Antoine Rosa MD        acetaminophen tablet 650 mg  650 mg Oral Q8H PRN Antoni Yi MD   650 mg at 12/15/23 0159    Amino acid 4.25% - dextrose 5% (CLINIMIX-E) solution (1L provides 42.5 gm AA, 50 gm CHO (170 kcal/L dextrose), Na 35, K 30, Mg 5, Ca 4.5, Acetate 70, Cl 39, Phos 15)   Intravenous Continuous Antoine Rosa MD 50 mL/hr at 12/17/23 2331 New Bag at 12/17/23 2331    enoxaparin injection 30 mg  30 mg Subcutaneous Daily Antoni Yi MD   30 mg at 12/17/23 1740    gabapentin capsule 300 mg  300 mg Oral Daily PRN Randy Evans DO        megestroL 400 mg/10 mL (10 mL) suspension 200 mg  200 mg Oral Daily Antoine Rosa MD   200 mg at 12/18/23 0844    melatonin tablet 6 mg  6 mg Oral Nightly PRN Antoni Yi MD        metoprolol injection 5 mg  5 mg Intravenous Q1H PRN Randy Evans DO        ondansetron injection 4 mg  4 mg Intravenous Q8H PRN Antoni Yi MD        sodium chloride 0.9% flush 10 mL  10 mL Intravenous PRN Antoni Yi MD        sucralfate tablet 1 g  1 g Oral QID (AC & HS) Antoni Yi MD   1 g at 12/18/23 0619       Indwelling Lines/Drains at  time of discharge:   Lines/Drains/Airways       Drain  Duration                  Urethral Catheter 12/15/23 1100 Coude 16 Fr. 3 days                    Time spent on the discharge of patient: 36 minutes         Antoine Rosa MD  Department of Hospital Medicine  [facility] - Medical Surgical Unit

## 2023-12-18 NOTE — PT/OT/SLP EVAL
Physical Therapy Swing Bed Evaluation      Patient Name:  Supa Carmen   MRN:  [id number]    History:     Per MD:  Patient is an 83y Male with history of Chron's disease, history of colon cancer, atrial fibrillation status post cardioversion who initially present to outpatient clinic on 12/14/23 for hypotension, edema, and general weakness.  On sotalol 120mg BID. Workup in ED unremarkable with the exception of an albumin of 1.2. Placed in observation overnight with albumin and lasix ordered to help with significant peripheral edema.       Overnight patient had minimal urine output. Bladder scan this morning demonstrated over 600ml of retained urine in bladder. Sandoval catheter placed. Patient and family state he has had very poor appetite in recent weeks and has been feeling progressively weaker. Will admit to inpatient for continued hypotension while medications are adjusted as well as to begin IV nutrition with plans for PEG tube placement early next week.      The patient will be admitted to swing bed today for continued therapy.  PEG tube insertion was postponed due to the patient's improvement.  He is now eating more and I placed him on Megace as well.  Currently he is just weak and requires more therapy.  No chest pain/SOB.  No fever/chills.  No N/V/D/C.  He will likely need some scheduled metoprolol but his pressures were on the low side so we are still monitoring especially since his previous dose of sotalol was too high.      Past Medical History:   Diagnosis Date    Atrial fibrillation     CAD (coronary artery disease)     Chemotherapy-induced neuropathy     Diverticulosis     DJD (degenerative joint disease)     Gout     History of colon cancer, stage III 05/20/2011    HLD (hyperlipidemia)     HTN (hypertension)     Personal history of colonic polyps 10/24/2017    s/p polypectomy - Dr Kenny Vargas       Past Surgical History:   Procedure Laterality Date    APPENDECTOMY      CARDIAC CATHETERIZATION   "10/14/2009    Dr Tim Bryan    COLONOSCOPY N/A 06/27/2023    Procedure: COLONOSCOPY;  Surgeon: Kenny Vargas MD;  Location: [facility];  Service: Gastroenterology;  Laterality: N/A;    COLONOSCOPY W/ BIOPSIES AND POLYPECTOMY  05/30/2012    3mm polyp - Dr Kenny Vargas    COLONOSCOPY W/ BIOPSIES AND POLYPECTOMY  10/24/2017    2 polyps - Dr Kenny Vargas    COLONOSCOPY W/ BIOPSIES AND POLYPECTOMY  07/22/2014    2 polyps - Dr Kenny Vargas    HEMICOLECTOMY, RIGHT, LAPAROSCOPIC  05/20/2011    Dr Elijah Huntley    LUMBAR DISCECTOMY      MEDIPORT INSERTION, SINGLE  06/16/2011    Dr Elijah Huntley       Recent Surgery: * No surgery found *      Subjective     Chief Complaint: "I'm ready to get back to bed"  Patient/Family Comments/goals: "to go home"  Pain/Comfort:  Pain Rating 1: 0/10      Living Environment:  Pt currently resides at home with his spouse in a single level home with 3 steps to enter (L railing).  Pt is typically independent with mobility using no AD. Pt is able to bathe himself independently, but does require assistance with dressing.   Equipment used at home: none.  DME owned (not currently used): rolling walker.        Objective:     Patient found up in chair with telemetry  upon PT entry to room.    General Precautions: Standard, fall   Orthopedic Precautions:N/A   Braces: N/A  Respiratory Status: Room air        Functional Mobility:     Assist Level Assistive Device Comments    Bed Mobility ModA  Sitting EOB to supine.  Pt required assistance to bring BLE into the bed.    Sit to stand/Stand to sit SBA RW Sit to stand/stand to sit. Pt stood from recliner level and returned to sitting at the EOB.  Cues required for hand placement.    Transfers      Gait CGA RW Pt able to ambulate 300 ft with a step through gait pattern and slow gait speed.  Pt required cueing for RW proximity and upright posture.  Short, shuffled steps noted throughout. Pt continues to demonstrate improved stability " with use of the RW.   Balance Training      Wheelchair Mobility      Stair Climbing      Car Transfer      Other:           Assessment:     Supa Carmen is a 83 y.o. male admitted with a medical diagnosis of Physical deconditioning.  He presents with the following impairments/functional limitations:  weakness, impaired endurance, impaired cognition, impaired self care skills, impaired functional mobility, gait instability, impaired balance, decreased safety awareness.    Rehab Prognosis: Good; patient would benefit from acute skilled PT services to address these deficits and reach maximum level of function.      Additional information: Pt has been transitioned to swing bed for continued therapy and medical management.  Per nsg, pt was requesting to get back to bed.  PT arrived and pt agreed to ambulate in the hallway prior to returning to bed.  Pt will require 24 hour care and direct supervision with all mobility upon returning home.     Patient left supine with all lines intact, call button in reach, and bed alarm on.      Plan:     During this hospitalization, patient to be seen 6 x/week to address the identified rehab impairments via gait training, therapeutic activities, therapeutic exercises and progress toward the following goals:    GOALS:   Multidisciplinary Problems       Physical Therapy Goals          Problem: Physical Therapy    Goal Priority Disciplines Outcome Goal Variances Interventions   Physical Therapy Goal     PT, PT/OT      Description: Patient will increase functional independence with mobility by performin. Supine to sit with Modified [place]  2. Sit to supine with Modified [place]  3. Sit to stand transfer with Modified [place]  4. Gait  x 400 feet with Modified [place] using Rolling Walker vs least restrictive AD.   5. Ascend/descend 3 stair with left Handrails Supervision using No Assistive Device.                          Recommendations:     Discharge  Recommendations:  24 hour care  Discharge Equipment Recommendations: none     Time Tracking:       PT Start Time: 1348     PT Stop Time: 1400  PT Total Time (min): 12 min     Billable Minutes: Evaluation 12 12/18/2023

## 2023-12-18 NOTE — PT/OT/SLP DISCHARGE
Physical Therapy Discharge Summary    Name: Supa Carmen  MRN: [id number]   Principal Problem: Hypotension     Patient Discharged from acute Physical Therapy on 2023.  Please refer to prior PT noted date on 2023 for functional status.     Assessment:     Patient appropriate for care in another setting.    Objective:     GOALS:   Multidisciplinary Problems       Physical Therapy Goals          Problem: Physical Therapy    Goal Priority Disciplines Outcome Goal Variances Interventions   Physical Therapy Goal     PT, PT/OT Adequate for Care Transition     Description: Patient will increase functional independence with mobility by performin. Sit to stand transfer with Modified [place]  2. Gait  x 300 feet with Modified [place] using RW vs No Assistive Device.   3. Ascend/descend 3 stair with left Handrails Supervision.                          Reasons for Discontinuation of Therapy Services  Transfer to alternate level of care.      Plan:     Patient Discharged to:  Swing Bed .      2023

## 2023-12-18 NOTE — PLAN OF CARE
12/18/23 1605   Discharge Assessment   Assessment Type Discharge Planning Assessment   Confirmed/corrected address, phone number and insurance Yes   Confirmed Demographics Correct on Facesheet   Source of Information family   If unable to respond/provide information was family/caregiver contacted? Yes   Contact Name/Number Ellie Carmen (wife) [phone]   When was your last doctors appointment? 12/14/23   Communicated SRINI with patient/caregiver Date not available/Unable to determine   Reason For Admission Hypotension, Bradycardia, Malnutrition   People in Home spouse   Facility Arrived From: Changed from inpatient to SB   Do you expect to return to your current living situation? Yes   Do you have help at home or someone to help you manage your care at home? Yes   Who are your caregiver(s) and their phone number(s)? Wife Ellie   Prior to hospitilization cognitive status: Alert/Oriented   Current cognitive status: Alert/Oriented   Walking or Climbing Stairs   (issues with ambulation recently due to weakness, no DME used currently)   Specify other help Wife assist   Home Accessibility stairs to enter home   Number of Stairs, Main Entrance two   Surface of Stairs, Main Entrance concrete   Stair Railings, Main Entrance railing on left side (ascending);railings safe and in good condition   Landing, Stairs, Main Entrance railings present   Home Layout Able to live on 1st floor   Equipment Currently Used at Home blood pressure machine   Readmission within 30 days? No   Patient currently being followed by outpatient case management? No   Do you currently have service(s) that help you manage your care at home? No  (MD order for  once Discharged, Family/Patient request for [facility])   Do you take prescription medications? Yes   Do you have prescription coverage? Yes   Coverage Medicare   Do you have any problems affording any of your prescribed medications? Yes   If yes, what medications? Eliquis, Sotal    Is the patient taking medications as prescribed? yes   Who is going to help you get home at discharge? Ellie (wife)   How do you get to doctors appointments? family or friend will provide   Are you on dialysis? No   Do you take coumadin? No   Discharge Plan A Home Health  (Family/patient request - [facility])   DME Needed Upon Discharge  walker, rolling   Discharge Plan discussed with: Spouse/sig other   Name(s) and Number(s) Ellie Carmen (wife) [phone]   Transition of Care Barriers None   OTHER   Name(s) of People in Home Ellie Carmen (wife)

## 2023-12-18 NOTE — SUBJECTIVE & OBJECTIVE
Past Medical History:   Diagnosis Date    Atrial fibrillation     CAD (coronary artery disease)     Chemotherapy-induced neuropathy     Diverticulosis     DJD (degenerative joint disease)     Gout     History of colon cancer, stage III 05/20/2011    HLD (hyperlipidemia)     HTN (hypertension)     Personal history of colonic polyps 10/24/2017    s/p polypectomy - Dr Kenny Vargas       Past Surgical History:   Procedure Laterality Date    APPENDECTOMY      CARDIAC CATHETERIZATION  10/14/2009    Dr Tim Bryan    COLONOSCOPY N/A 06/27/2023    Procedure: COLONOSCOPY;  Surgeon: Kenny Vargas MD;  Location: [facility];  Service: Gastroenterology;  Laterality: N/A;    COLONOSCOPY W/ BIOPSIES AND POLYPECTOMY  05/30/2012    3mm polyp - Dr Kenny Vargas    COLONOSCOPY W/ BIOPSIES AND POLYPECTOMY  10/24/2017    2 polyps - Dr Kenny Vargas    COLONOSCOPY W/ BIOPSIES AND POLYPECTOMY  07/22/2014    2 polyps - Dr Kenny Vargas    HEMICOLECTOMY, RIGHT, LAPAROSCOPIC  05/20/2011    Dr Elijah Huntley    LUMBAR DISCECTOMY      MEDIPORT INSERTION, SINGLE  06/16/2011    Dr Elijah Huntley       Review of patient's allergies indicates:  No Known Allergies    Current Facility-Administered Medications on File Prior to Encounter   Medication    [COMPLETED] potassium chloride SA CR tablet 40 mEq    [DISCONTINUED] 0.9%  NaCl infusion (for blood administration)    [DISCONTINUED] acetaminophen tablet 650 mg    [DISCONTINUED] Amino acid 4.25% - dextrose 5% (CLINIMIX-E) solution (1L provides 42.5 gm AA, 50 gm CHO (170 kcal/L dextrose), Na 35, K 30, Mg 5, Ca 4.5, Acetate 70, Cl 39, Phos 15)    [DISCONTINUED] Amino acid 4.25% - dextrose 5% (CLINIMIX-E) solution (1L provides 42.5 gm AA, 50 gm CHO (170 kcal/L dextrose), Na 35, K 30, Mg 5, Ca 4.5, Acetate 70, Cl 39, Phos 15)    [DISCONTINUED] enoxaparin injection 30 mg    [DISCONTINUED] gabapentin capsule 300 mg    [DISCONTINUED] megestroL 400 mg/10 mL (10 mL) suspension 200 mg     [DISCONTINUED] melatonin tablet 6 mg    [DISCONTINUED] metoprolol injection 5 mg    [DISCONTINUED] mupirocin 2 % ointment    [DISCONTINUED] ondansetron injection 4 mg    [DISCONTINUED] sodium chloride 0.9% flush 10 mL    [DISCONTINUED] sucralfate tablet 1 g     Current Outpatient Medications on File Prior to Encounter   Medication Sig    apixaban (ELIQUIS) 5 mg Tab Take 1 tablet (5 mg total) by mouth 2 (two) times a day.    gabapentin (NEURONTIN) 300 MG capsule Take 1 capsule (300 mg total) by mouth 3 (three) times daily. (Patient taking differently: Take 300 mg by mouth daily as needed.)    multivitamin (THERAGRAN) per tablet Take 1 tablet by mouth once daily.    sotaloL (BETAPACE) 120 MG Tab Take 120 mg by mouth 2 (two) times daily.     Family History       Problem Relation (Age of Onset)    No Known Problems Mother    Other Father (42)          Tobacco Use    Smoking status: Former     Types: Cigarettes    Smokeless tobacco: Never   Substance and Sexual Activity    Alcohol use: Not Currently    Drug use: Never    Sexual activity: Not Currently     Review of Systems   Constitutional:  Positive for activity change and fatigue.   HENT: Negative.     Eyes: Negative.    Respiratory: Negative.     Cardiovascular: Negative.    Gastrointestinal: Negative.    Endocrine: Negative.    Genitourinary: Negative.    Musculoskeletal:  Positive for arthralgias and gait problem.   Skin: Negative.    Allergic/Immunologic: Negative.    Neurological:  Positive for weakness.   Hematological: Negative.    Psychiatric/Behavioral:  Positive for confusion.      Objective:     Vital Signs (Most Recent):    Vital Signs (24h Range):  Temp:  [97.8 °F (36.6 °C)-98.2 °F (36.8 °C)] 97.8 °F (36.6 °C)  Pulse:  [70-92] 92  Resp:  [16-22] 20  SpO2:  [95 %-98 %] 95 %  BP: (125-143)/(77-84) 142/84        There is no height or weight on file to calculate BMI.     Physical Exam  Constitutional:       Appearance: Normal appearance. He is normal weight.    HENT:      Head: Normocephalic and atraumatic.      Nose: Nose normal.      Mouth/Throat:      Mouth: Mucous membranes are moist.      Pharynx: Oropharynx is clear.   Eyes:      Extraocular Movements: Extraocular movements intact and EOM normal.      Conjunctiva/sclera: Conjunctivae normal.      Pupils: Pupils are equal, round, and reactive to light.   Cardiovascular:      Rate and Rhythm: Normal rate and regular rhythm.      Pulses: Normal pulses.      Heart sounds: Normal heart sounds.   Pulmonary:      Effort: Pulmonary effort is normal.      Breath sounds: Normal breath sounds.   Abdominal:      General: Bowel sounds are normal.      Palpations: Abdomen is soft.   Musculoskeletal:         General: Normal range of motion.      Cervical back: Normal range of motion and neck supple.   Skin:     General: Skin is warm and dry.      Capillary Refill: Capillary refill takes 2 to 3 seconds.   Neurological:      General: No focal deficit present.      Mental Status: He is alert. Mental status is at baseline.   Psychiatric:         Attention and Perception: Attention normal.         Mood and Affect: Mood normal.         Speech: Speech normal.         Behavior: Behavior normal. Behavior is cooperative.         Thought Content: Thought content normal.         Cognition and Memory: Cognition is impaired. Memory is impaired.              CRANIAL NERVES     CN I  cranial nerve I not tested    CN II   Visual fields full to confrontation.     CN III, IV, VI   Pupils are equal, round, and reactive to light.  Extraocular motions are normal.   CN III: no CN III palsy  CN VI: no CN VI palsy    CN V   Facial sensation intact.     CN VII   Facial expression full, symmetric.     CN VIII   CN VIII normal.     CN IX, X   CN IX normal.   CN X normal.     CN XI   CN XI normal.     CN XII   CN XII normal.        Significant Labs: All pertinent labs within the past 24 hours have been reviewed.  BMP:   Recent Labs   Lab 12/18/23  0445   NA  139   K 3.6   CO2 21*   BUN 23.0   CREATININE 0.85   CALCIUM 7.2*   MG 1.80     CBC:   Recent Labs   Lab 12/17/23 0448 12/18/23  0445   WBC 4.71 6.33   HGB 10.7* 12.4*   HCT 32.5* 37.5*    156     CMP:   Recent Labs   Lab 12/17/23  0448 12/18/23  0445    139   K 3.0* 3.6   CO2 22* 21*   BUN 28.0* 23.0   CREATININE 1.03 0.85   CALCIUM 7.1* 7.2*   ALBUMIN 2.2* 2.0*   BILITOT 0.5 0.4   ALKPHOS 39* 37*   AST 13 15   ALT 6 8     Magnesium:   Recent Labs   Lab 12/17/23 0448 12/18/23  0445   MG 1.80 1.80       Significant Imaging: I have reviewed all pertinent imaging results/findings within the past 24 hours.

## 2023-12-19 PROBLEM — E87.6 HYPOKALEMIA: Status: ACTIVE | Noted: 2023-01-01

## 2023-12-19 NOTE — PROGRESS NOTES
[facility]  [facility] Medicine  Progress Note    Patient Name: Supa Carmen  MRN: [id number]  Patient Class: IP- Swing   Admission Date: 12/18/2023  Length of Stay: 1 days  Attending Physician: Eliane Aceves DO  Primary Care Provider: Randy Evans DO        Subjective:     Principal Problem:Physical deconditioning        HPI:  Patient is an 83y Male with history of Chron's disease, history of colon cancer, atrial fibrillation status post cardioversion who initially present to outpatient clinic on 12/14/23 for hypotension, edema, and general weakness.  On sotalol 120mg BID. Workup in ED unremarkable with the exception of an albumin of 1.2. Placed in observation overnight with albumin and lasix ordered to help with significant peripheral edema.       Overnight patient had minimal urine output. Bladder scan this morning demonstrated over 600ml of retained urine in bladder. Sandoval catheter placed. Patient and family state he has had very poor appetite in recent weeks and has been feeling progressively weaker. Will admit to inpatient for continued hypotension while medications are adjusted as well as to begin IV nutrition with plans for PEG tube placement early next week.     The patient will be admitted to swing bed today for continued therapy.  PEG tube insertion was postponed due to the patient's improvement.  He is now eating more and I placed him on Megace as well.  Currently he is just weak and requires more therapy.  No chest pain/SOB.  No fever/chills.  No N/V/D/C.  He will likely need some scheduled metoprolol but his pressures were on the low side so we are still monitoring especially since his previous dose of sotalol was too high.      Overview/Hospital Course:  12/19/23: Pt sitting in recliner with feet up. Significant swelling in legs. Oriented and pleasant. States his appetite is improving.         Review of Systems   Constitutional:  Positive for activity  change, appetite change and fatigue. Negative for fever.   Respiratory:  Negative for shortness of breath and wheezing.    Cardiovascular:  Positive for leg swelling. Negative for chest pain.   Gastrointestinal:  Negative for abdominal distention, abdominal pain, constipation and nausea.   Genitourinary:  Negative for difficulty urinating, dysuria and frequency.   Musculoskeletal:  Negative for arthralgias, joint swelling and myalgias.   Skin:  Positive for wound. Negative for rash.   Neurological:  Positive for weakness.   Psychiatric/Behavioral:  Negative for agitation, behavioral problems and confusion.      Objective:     Vital Signs (Most Recent):  Temp: 98.6 °F (37 °C) (12/19/23 1208)  Pulse: 90 (12/19/23 1209)  Resp: (!) 24 (12/19/23 1209)  BP: 112/68 (12/19/23 1208)  SpO2: 97 % (12/19/23 1135) Vital Signs (24h Range):  Temp:  [97.8 °F (36.6 °C)-98.6 °F (37 °C)] 98.6 °F (37 °C)  Pulse:  [] 90  Resp:  [14-25] 24  SpO2:  [95 %-98 %] 97 %  BP: ()/() 112/68     Weight: 72.7 kg (160 lb 4.4 oz)  Body mass index is 26.67 kg/m².    Intake/Output Summary (Last 24 hours) at 12/19/2023 1318  Last data filed at 12/19/2023 1244  Gross per 24 hour   Intake 1245.83 ml   Output 550 ml   Net 695.83 ml         Physical Exam  Constitutional:       General: He is not in acute distress.     Appearance: Normal appearance. He is normal weight.   HENT:      Head: Normocephalic and atraumatic.      Nose: Nose normal.   Eyes:      Conjunctiva/sclera: Conjunctivae normal.   Cardiovascular:      Rate and Rhythm: Normal rate and regular rhythm.      Pulses: Normal pulses.      Heart sounds: Normal heart sounds. No murmur heard.     No gallop.   Pulmonary:      Effort: Pulmonary effort is normal. No respiratory distress.      Breath sounds: No wheezing, rhonchi or rales.   Abdominal:      General: Abdomen is flat. Bowel sounds are normal. There is no distension.      Palpations: Abdomen is soft.      Tenderness: There is  no abdominal tenderness.   Musculoskeletal:         General: No swelling or deformity. Normal range of motion.      Cervical back: Normal range of motion.      Right lower leg: Edema present.      Left lower leg: Edema present.      Comments: 2+ b/l legs up to knee.   Skin:     General: Skin is warm and dry.      Coloration: Skin is not jaundiced.      Findings: No rash.      Comments: Right forearm skin tear   Neurological:      General: No focal deficit present.      Mental Status: He is alert and oriented to person, place, and time.   Psychiatric:         Mood and Affect: Mood normal.         Behavior: Behavior normal.             Significant Labs: All pertinent labs within the past 24 hours have been reviewed.  Recent Lab Results         12/19/23  0422        Albumin/Globulin Ratio 1.0       Albumin 1.8       ALP 39       ALT 9       AST 18       Baso # 0.04       Basophil % 0.8       BILIRUBIN TOTAL 0.3       BUN 22.0       Calcium 7.3       Chloride 110       CO2 23       Creatinine 0.76       eGFR >60       Eos # 0.06       Eosinophil % 1.2       Globulin, Total 1.8       Glucose 93       Hematocrit 35.8       Hemoglobin 11.8       Immature Grans (Abs) 0.25       Immature Granulocytes 5.0       Lymph # 0.94       LYMPH % 19.0       Magnesium  1.70       MCH 28.2       MCHC 33.0       MCV 85.4       Mono # 0.36       Mono % 7.3       MPV 8.8       Neut # 3.31       Neut % 66.7       nRBC 0.0       Platelet Count 143       Potassium 3.4       PROTEIN TOTAL 3.6       RBC 4.19       RDW 17.0       Sodium 137       WBC 4.96               Significant Imaging: I have reviewed all pertinent imaging results/findings within the past 24 hours.    Assessment/Plan:      * Physical deconditioning  Admit to swing bed  OOB  Therapy  Resume transfer meds  Labs as needed      Hypokalemia  Gave Kcl 20meq po. CMP in am.    Severe protein-calorie malnutrition  Nutrition consulted. Most recent weight and BMI monitored-      Measurements:  Wt Readings from Last 1 Encounters:   12/19/23 72.7 kg (160 lb 4.4 oz)   Body mass index is 26.67 kg/m².    Patient has been screened and assessed by RD.       Interventions/Recommendations (treatment strategy):    Continue Clinimix and megace    12/19/23: begin weaning clinimix and continue monitoring oral intake.         Hypotension  Closely monitor.  12/19/23: restarting BB for hr control. Metoprolol tartrate 25mg bid. Will monitor bp response. Overall tolerating metoprolol 5mg iv prn.      A-fib  Sotolol was dc'd upon admission for concern for bb toxicity. Pt was given glucagon.   HR has been >150bpm at times. Restarting low dose bb. Metoprolol tartrate 25mg bid. Cardiologist is Dr. Lyle Marroquin.       VTE Risk Mitigation (From admission, onward)           Ordered     enoxaparin injection 40 mg  Every 24 hours         12/19/23 0834     IP VTE HIGH RISK PATIENT  Once         12/18/23 1422     Place sequential compression device  Until discontinued         12/18/23 1422                    Discharge Planning   SRINI: 12/22/2023     Code Status: Full Code   Is the patient medically ready for discharge?:     Reason for patient still in hospital (select all that apply): Patient trending condition, Laboratory test, Treatment, PT / OT recommendations, and Pending disposition  Discharge Plan A: Home Health (Family/patient request - [facility])                  RUDDY SWANSON DO  Department of Hospital Medicine   [facility] [place] - Medical Surgical Unit

## 2023-12-19 NOTE — HOSPITAL COURSE
"12/19/23: Pt sitting in recliner with feet up. Significant swelling in legs. Oriented and pleasant. States his appetite is improving.   12/20/23: Rounded for weekly team meeting with family present in the room. Progressing well with PT. OT planning for shower tomorrow. Possible DC tomorrow or Friday. CM sent for urology referral Chad Anders and f/u appts with Dr. Marroquin and PCP. Will also rx rolling walker for pt. He will go home on megace per nutrition recs.    12/20/23: OT went well this morning. Will DC home today with HH. HE will have urology and cardiology follow-up. Will DC on 1 month of megace per nutritionist's recs. However, PCP will need to re-evaluate the need/benefits. See the following possible adverse effects of megace in elderly:   Effect:  Cardiovascular-Avoid use in the elderly. Increased thrombotic event risk.    Renal-Primarily renally excreted which may further increase ADR risks in elderly.    General- Little evidence to support use for weight gain. May induce vaginal bleeding in females.    Organ systems affected by interaction: Cardiovascular    MEGESTROL (APPETITE STIMULANT) is included in the following "potentially harmful drugs in the elderly" lists: STOPP, HEDIS, BEERS.    Sending home on new metoprolol 25mg bid. May need to be increased to 50mg bid. Will defer to pcp and cards.     PE:   General: alert.  HEENT: NC, AT.  Resp: CTAB.  Cardio: RRR, no m/r/g. 2+ edema b/l LE up through mid shin.  Abd: soft, NT, ND, + BS x 4    "

## 2023-12-19 NOTE — NURSING
Spoke with family regarding discharge planning. Plan for discharge on Friday with Home Health service. Home health of choice is [facility]. Referral sent via [facility].

## 2023-12-19 NOTE — CONSULTS
Consults  Swing Bed consult.  Pt is an 83 year old CM admitted to swing bed on 12/18/23 for therapy and continued IV nutrition.  Pt had initially been admitted to [facility] due to weakness and possible beta blocker Overdose.  Pt also struggled with urinary retention and a catheter was utilized to relieve that.  Pt also had SOB.  Pt's family had reported that he had not had much appetite lately as well and had weakness and fatigue.  Pt is followed by Dr Evans.    Pt has a history of a-fib, CAD, neuropathy, diverticulitis, DJD, gout, colon cancer, HLD and HTN.  Pt is a former smoker.  Pt denies use of alcohol or substances.  No history of  issues noted.  Pt utilizes a walker or wheelchair to ambulate and neds help with ADL's.  Pt is very hard of hearing.    Pt lives with his wife of 63 years in [place].  Pt has two children and describes the family as close.  Pt was employed as an  and later on worked for the [employer].      Pt reports that he is feeling much better now than he was.  Pt's plan is to return home on discharge and resume previous levels of function.  Case management will coordinate discharge and referrals to followup care per MD order.

## 2023-12-19 NOTE — PLAN OF CARE
Problem: Adult Inpatient Plan of Care  Goal: Plan of Care Review  Outcome: Ongoing, Progressing  Goal: Patient-Specific Goal (Individualized)  Outcome: Ongoing, Progressing  Goal: Absence of Hospital-Acquired Illness or Injury  Outcome: Ongoing, Progressing  Goal: Optimal Comfort and Wellbeing  Outcome: Ongoing, Progressing  Goal: Readiness for Transition of Care  Outcome: Ongoing, Progressing     Problem: Impaired Wound Healing  Goal: Optimal Wound Healing  Outcome: Ongoing, Progressing     Problem: Fall Injury Risk  Goal: Absence of Fall and Fall-Related Injury  Outcome: Ongoing, Progressing     Problem: Fatigue  Goal: Improved Activity Tolerance  Outcome: Ongoing, Progressing     Problem: Heart Failure Comorbidity  Goal: Maintenance of Heart Failure Symptom Control  Outcome: Ongoing, Progressing     Problem: Hypertension Comorbidity  Goal: Blood Pressure in Desired Range  Outcome: Ongoing, Progressing     Problem: Dysrhythmia  Goal: Normalized Cardiac Rhythm  Outcome: Ongoing, Progressing

## 2023-12-19 NOTE — SUBJECTIVE & OBJECTIVE
Review of Systems   Constitutional:  Positive for activity change, appetite change and fatigue. Negative for fever.   Respiratory:  Negative for shortness of breath and wheezing.    Cardiovascular:  Positive for leg swelling. Negative for chest pain.   Gastrointestinal:  Negative for abdominal distention, abdominal pain, constipation and nausea.   Genitourinary:  Negative for difficulty urinating, dysuria and frequency.   Musculoskeletal:  Negative for arthralgias, joint swelling and myalgias.   Skin:  Positive for wound. Negative for rash.   Neurological:  Positive for weakness.   Psychiatric/Behavioral:  Negative for agitation, behavioral problems and confusion.      Objective:     Vital Signs (Most Recent):  Temp: 98.6 °F (37 °C) (12/19/23 1208)  Pulse: 90 (12/19/23 1209)  Resp: (!) 24 (12/19/23 1209)  BP: 112/68 (12/19/23 1208)  SpO2: 97 % (12/19/23 1135) Vital Signs (24h Range):  Temp:  [97.8 °F (36.6 °C)-98.6 °F (37 °C)] 98.6 °F (37 °C)  Pulse:  [] 90  Resp:  [14-25] 24  SpO2:  [95 %-98 %] 97 %  BP: ()/() 112/68     Weight: 72.7 kg (160 lb 4.4 oz)  Body mass index is 26.67 kg/m².    Intake/Output Summary (Last 24 hours) at 12/19/2023 1318  Last data filed at 12/19/2023 1244  Gross per 24 hour   Intake 1245.83 ml   Output 550 ml   Net 695.83 ml         Physical Exam  Constitutional:       General: He is not in acute distress.     Appearance: Normal appearance. He is normal weight.   HENT:      Head: Normocephalic and atraumatic.      Nose: Nose normal.   Eyes:      Conjunctiva/sclera: Conjunctivae normal.   Cardiovascular:      Rate and Rhythm: Normal rate and regular rhythm.      Pulses: Normal pulses.      Heart sounds: Normal heart sounds. No murmur heard.     No gallop.   Pulmonary:      Effort: Pulmonary effort is normal. No respiratory distress.      Breath sounds: No wheezing, rhonchi or rales.   Abdominal:      General: Abdomen is flat. Bowel sounds are normal. There is no  distension.      Palpations: Abdomen is soft.      Tenderness: There is no abdominal tenderness.   Musculoskeletal:         General: No swelling or deformity. Normal range of motion.      Cervical back: Normal range of motion.      Right lower leg: Edema present.      Left lower leg: Edema present.      Comments: 2+ b/l legs up to knee.   Skin:     General: Skin is warm and dry.      Coloration: Skin is not jaundiced.      Findings: No rash.      Comments: Right forearm skin tear   Neurological:      General: No focal deficit present.      Mental Status: He is alert and oriented to person, place, and time.   Psychiatric:         Mood and Affect: Mood normal.         Behavior: Behavior normal.             Significant Labs: All pertinent labs within the past 24 hours have been reviewed.  Recent Lab Results         12/19/23  0422        Albumin/Globulin Ratio 1.0       Albumin 1.8       ALP 39       ALT 9       AST 18       Baso # 0.04       Basophil % 0.8       BILIRUBIN TOTAL 0.3       BUN 22.0       Calcium 7.3       Chloride 110       CO2 23       Creatinine 0.76       eGFR >60       Eos # 0.06       Eosinophil % 1.2       Globulin, Total 1.8       Glucose 93       Hematocrit 35.8       Hemoglobin 11.8       Immature Grans (Abs) 0.25       Immature Granulocytes 5.0       Lymph # 0.94       LYMPH % 19.0       Magnesium  1.70       MCH 28.2       MCHC 33.0       MCV 85.4       Mono # 0.36       Mono % 7.3       MPV 8.8       Neut # 3.31       Neut % 66.7       nRBC 0.0       Platelet Count 143       Potassium 3.4       PROTEIN TOTAL 3.6       RBC 4.19       RDW 17.0       Sodium 137       WBC 4.96               Significant Imaging: I have reviewed all pertinent imaging results/findings within the past 24 hours.

## 2023-12-19 NOTE — PT/OT/SLP PROGRESS
Physical Therapy Treatment Note           Name: Supa Carmen    : 1940 (83 y.o.)  MRN: [id number]             Subjective Assessment:     No complaints  Lethargic   X Awake, alert, cooperative  Uncooperative    Agitated  c/o pain    Appropriate  c/o fatigue   X Confused  Treated at bedside     Emotionally labile  Treated in gym/dept.    Impulsive  Other:    Flat affect       Pain/Comfort:    Pain Rating 1: 0/10    Therapy Precautions:      Cognitive deficits  Spinal precautions    Collar - hard  Sternal precautions    Collar - soft   TLSO   X Fall risk  LSO    Hip precautions - posterior  Knee immobilizer    Hip precautions - anterior  WBAT    Impaired communication  Partial weightbearing    Oxygen  TTWB    PEG tube  NWB    Visual deficits  Other:   X Hearing deficits             Mobility Training:     Assist Level Assistive Device Comments    Bed Mobility SBA  Semi-supine to sitting at EOB.  Bed railing utilized for assistance.  Increased time required to reach upright sitting, but pt was able to do so without physical assistance from PT.    Sit to stand/Stand to sit SBA RW Sit to stand/stand to sit. Pt stood from EOB and returned to sitting in the recliner. VC provided for hand placement.    Transfers      Gait SBA-CGA RW Pt able to ambulate 375 ft with a step through gait pattern and steady gait speed.  Decreased foot clearance observed bilaterally.  No LOB noted, but occasional CGA was provided when changing directions. Pt appeared more confident in his gait, but continues to benefit from use of the RW.   Balance Training      Wheelchair Mobility      Stair Climbing      Car Transfer      Other:           Assessment:     Patient tolerated session fairly and was able to increase his gait distance without significant complaints. Upon D/C from this facility, pt will benefit from 24 hour family care and  PT services.      GOALS:   Multidisciplinary Problems       Physical Therapy Goals           Problem: Physical Therapy    Goal Priority Disciplines Outcome Goal Variances Interventions   Physical Therapy Goal     PT, PT/OT Ongoing, Progressing     Description: Patient will increase functional independence with mobility by performin. Supine to sit with Modified Randall  2. Sit to supine with Modified Randall  3. Sit to stand transfer with Modified Randall  4. Gait  x 400 feet with Modified Randall using Rolling Walker vs least restrictive AD.   5. Ascend/descend 3 stair with left Handrails Supervision using No Assistive Device.                            Discharge Recommendations:      24 hour care and  services    Discharge Equipment Recommendations:     none     At end of treatment, patient remained:     X Up in chair     In bed   X With alarm activated    Bed rails up   X Call bell in reach      Family/friends present     Restraints secured properly     In bathroom with CNA/RN notified     In gym with PT/PTA/tech     Nurse aware     Other:           PT Start Time: 1421     PT Stop Time: 1439  PT Total Time (min): 18 min     Billable Minutes: Gait Training 2023

## 2023-12-19 NOTE — PT/OT/SLP EVAL
Occupational Therapy   Swing Bed Evaluation    Name: Supa Carmen  MRN: [id number]  Admitting Diagnosis:  Physical deconditioning    History:   As per chart review:  Patient is an 83y Male with history of Chron's disease, history of colon cancer, atrial fibrillation status post cardioversion who initially present to outpatient clinic on 12/14/23 for hypotension, edema, and general weakness.  On sotalol 120mg BID. Workup in ED unremarkable with the exception of an albumin of 1.2. Placed in observation overnight with albumin and lasix ordered to help with significant peripheral edema.       Overnight patient had minimal urine output. Bladder scan this morning demonstrated over 600ml of retained urine in bladder. Sandoval catheter placed. Patient and family state he has had very poor appetite in recent weeks and has been feeling progressively weaker. Will admit to inpatient for continued hypotension while medications are adjusted as well as to begin IV nutrition with plans for PEG tube placement early next week.           Past Medical History:   Diagnosis Date    Atrial fibrillation      CAD (coronary artery disease)      Chemotherapy-induced neuropathy      Diverticulosis      DJD (degenerative joint disease)      Gout      History of colon cancer, stage III 05/20/2011    HLD (hyperlipidemia)      HTN (hypertension)      Personal history of colonic polyps 10/24/2017     s/p polypectomy - Dr Kenny Vargas                 Past Surgical History:   Procedure Laterality Date    APPENDECTOMY        CARDIAC CATHETERIZATION   10/14/2009     Dr Tim Bryan    COLONOSCOPY N/A 06/27/2023     Procedure: COLONOSCOPY;  Surgeon: Kenny Vargas MD;  Location: [facility];  Service: Gastroenterology;  Laterality: N/A;    COLONOSCOPY W/ BIOPSIES AND POLYPECTOMY   05/30/2012     3mm polyp - Dr Kenny Vargas    COLONOSCOPY W/ BIOPSIES AND POLYPECTOMY   10/24/2017     2 polyps - Dr Kenny Vargas    COLONOSCOPY W/ BIOPSIES AND  "POLYPECTOMY   07/22/2014     2 polyps - Dr Kenny Vargas    HEMICOLECTOMY, RIGHT, LAPAROSCOPIC   05/20/2011     Dr Elijah Huntley    LUMBAR DISCECTOMY        MEDIPORT INSERTION, SINGLE   06/16/2011     Dr Elijah Huntley            Subjective      Chief Complaint: Pain at IV sight  Patient/Family Comments/goals: "to go home."     Occupational Profile:  Lives with: Wife  Home Environment: [facility] 3 steps to enter with rail on left.  Previous level of function: Pt mod I most ADLs, was getting assistance from wife with LE dressing.  Functional mobility wihtout assistive device but did report holding onto furniture and walls.  Equipment Used at Home:  walker, rolling        Objective:      Communicated with: PT prior to session.  Patient found supine with bed alarm, peripheral IV, kahn catheter, telemetry upon OT entry to room.     General Precautions: Standard, fall   Orthopedic Precautions:Full weight bearing   Braces: N/A  Respiratory Status: Room air     Occupational Performance:     Mobility  Assist level Comments    Bed mobility Min Transfer training supine to sit with min assist with upper trunk.  Pt able to scoot forward with verbal cuing.   Balance training       Transfer  Min Transfer training bed to recliner min assist and verbal cuing for proper hand placement stand pivot.   Functional mobility       Sit to stand transitions CGA Transfer training sit to stand 3x's during ADLs CGA and verbal cuing recliner to RW   Other:             Activities of Daily Living Assist level Comments    Feeding       Grooming/hygiene SBA ADL grooming assessment; after setup pt able to wash face, perform oral care and hair grooming with 2 verbal cues for sequencing.   Bathing Mod ADL bathing assessment sitting at EOB for sponge bath.  After setup pt able to bathe trunk, BUEs, and thighs with verbal cuing.  Assist required with remaining.   Upper body dressing Min Tie gown.   Lower body dressing       Toileting Dep Kahn cath "   Toilet transfer       Adaptive equipment training       Other:          Upper Extremity Strength:  Right Upper Extremity: WFL except decreased shoulder AROM and strength 3/5. Noted pt with stage 3 pitting edema in forearm and hand secondary to prior infitrated IV.  Left Upper Extremity: WFL except decreased shoulder AROM and strength 3+/5, remaining joints WFL     Cognitive/Visual Perceptual:  Cognitive/Psychosocial Skills:     -       Oriented to: Person and Place   -       Follows Commands/attention:Follows one-step commands  -       Communication: Very [place]  -       Memory: Impaired STM and Impaired LTM  -       Safety awareness/insight to disability: impaired   -       Mood/Affect/Coping skills/emotional control: Cooperative, Flat affect, and Pleasant     Treatment & Education:  Performed ADL assessment, see above grid.  Discussed scpoe and goals of OT and POC.  Educated pt on call bell function and call before you fall.        Assessment:      Supa Carmen is a 83 y.o. male with a medical diagnosis of Hypotension.  He presents with decreased functional transfers, ADLs and cognition secondary to recent illness. Performance deficits affecting function: weakness, gait instability, decreased ROM, impaired endurance, impaired balance, impaired self care skills, decreased safety awareness, impaired cognition, edema.       Rehab Prognosis: Good; patient would benefit from acute skilled OT services to address these deficits and reach maximum level of function.      Pain/Comfort:  Pain Rating 1: 3/10  Location - Side 1: Right  Location 1: hip  Pain Addressed 1: Reposition, Distraction     Blood Pressure:         Plan:      Patient to be seen 5 x/week to address the above listed problems via self-care/home management, therapeutic activities, therapeutic exercises  Plan of Care Reviewed with: patient        GOALS:   Multidisciplinary Problems         Occupational Therapy Goals                  Problem: Occupational  Therapy     Goal Priority Disciplines Outcome Interventions   Occupational Therapy Goal      OT, PT/OT       Description: Patient will increase functional independence with ADLs by performing:     LE Dressing with Contact Guard Assistance.  Toileting from toilet with Stand-by Assistance for hygiene and clothing management.   Bathing from  shower chair/bench with Minimal Assistance.  Toilet transfer to toilet with Stand-by Assistance.                                      Patient left supine with all lines intact, call button in reach, and bed alarm on        Recommendations:      Discharge Recommendations:    Discharge Equipment Recommendations:  shower chair  Barriers to discharge:  Decreased caregiver support     Time Tracking:     OT Date of Treatment: 12/19/23  OT Start Time: 0825  OT Stop Time: 0900  OT Total Time (min): 35 min    Billable Minutes:Evaluation 35      12/19/2023

## 2023-12-19 NOTE — PT/OT/SLP EVAL
[facility]  Speech Language Pathology   Cognitive Communication Evaluation    Patient Name:  Supa Carmen   MRN:  [id number]  Admitting Diagnosis: Physical deconditioning    Past Medical History:   Diagnosis Date    Atrial fibrillation     CAD (coronary artery disease)     Chemotherapy-induced neuropathy     Diverticulosis     DJD (degenerative joint disease)     Gout     History of colon cancer, stage III 05/20/2011    HLD (hyperlipidemia)     HTN (hypertension)     Personal history of colonic polyps 10/24/2017    s/p polypectomy - Dr Kenny Vargas       Past Surgical History:   Procedure Laterality Date    APPENDECTOMY      CARDIAC CATHETERIZATION  10/14/2009    Dr Tim Bryan    COLONOSCOPY N/A 06/27/2023    Procedure: COLONOSCOPY;  Surgeon: Kenny Vargas MD;  Location: [facility];  Service: Gastroenterology;  Laterality: N/A;    COLONOSCOPY W/ BIOPSIES AND POLYPECTOMY  05/30/2012    3mm polyp - Dr Kenny Vargas    COLONOSCOPY W/ BIOPSIES AND POLYPECTOMY  10/24/2017    2 polyps - Dr Kenny Vargas    COLONOSCOPY W/ BIOPSIES AND POLYPECTOMY  07/22/2014    2 polyps - Dr Kenny Vragas    HEMICOLECTOMY, RIGHT, LAPAROSCOPIC  05/20/2011    Dr Elijah Huntley    LUMBAR DISCECTOMY      MEDIPORT INSERTION, SINGLE  06/16/2011    Dr Elijah Huntley       Social History: Patient lives with his wife. Pt may live with his 2 children, but this was confused/contradictory in conversation.    Occupation/hobbies/homemaking: none reported despite encouragement and examples; reduced understanding of questioning    Respiratory Status: room air    Recommendations:                 General Recommendations:  Cognitive-linguistic therapy  Diet recommendations:  Regular Diet - IDDSI Level 7, Thin liquids - IDDSI Level 0   Aspiration Precautions:  none    General Precautions: Standard, fall  Communication strategies:  repeat as needed, Pt is hard of hearing    Subjective     Patient reported: Pt reported no  "confusion or changes in cognitive status. Overt difficulty following simple conversation, but Pt used humor to avoid difficulty.  Patient goals: "go home"  Behavioral observations: use of humor with difficulty; significantly reduced insight and awareness; frustration with structured tasks - Pt often responded with "go ask somebody else" or  "get outta here with that."    Pain/Comfort:  Pain Rating 1: 0/10      PATIENT REPORTED OUTCOME MEASURES: reduced insight and awareness - not completed as Pt reported no difficulty despite noted difficulty.      Objective:     Orientation: orientation to all information except situation  General Attention: adequate at basic level    LANGUAGE:   Receptive Language:  Simple y/n Questions: 100%  Complex y/n Questions: 100%    Expressive Language:   Repetition: 75%    COGNITION:  Memory Impairment Screen (MIS): 2 (<=4: possible cognitive deficit likely)  Recall/ Repetition: 75%  LTM: 75% with cues  Verbal Problem Solvin%  Insight and Awareness: reduced  Pragmatics: WNL      Patient Reported Outcome Measures: not used due to poor insight/awareness    Assessment:   Supa Carmen is a 83 y.o. male with an SLP diagnosis of Cognitive-Linguistic Impairment.  He presents with poor recall, awareness, and insight. Reduced divided attention noted.    DANNA NOMS (Functional Communication Measures):  DANNA NOMS: EVAL (... )   Attention: 4   Memory: 3   Motor Speech: 7   Reading: Not assessed   Spoken Language Comprehension: 6   Spoken Language Expression: 6   Swallowin     Goals:   Multidisciplinary Problems       SLP Goals          Problem: SLP    Goal Priority Disciplines Outcome   SLP Goal     SLP Ongoing, Progressing   Description: LTGs:  Pt will recall and use safety strategies for improved safety with ADLs.    STGs:  Pt will participate in functional assessment with ST and PT - goals to be updated accordingly                       Plan:   Functional assessment with PT is " recommended to assess functional safety.   Patient to be seen:  3 x/week   Plan of Care expires:  12/29/23  Plan of Care reviewed with:  patient   SLP Follow-Up:  Yes       Discharge recommendations:      Barriers to Discharge:  Safety Awareness      Time Tracking:   SLP Treatment Date:   12/19/23  Speech Start Time:  1253  Speech Stop Time:  1310     Speech Total Time (min):  17 min    Billable Minutes: Eval 17min / 1 unit     12/19/2023

## 2023-12-19 NOTE — PLAN OF CARE
Problem: Adult Inpatient Plan of Care  Goal: Plan of Care Review  Outcome: Ongoing, Progressing  Goal: Patient-Specific Goal (Individualized)  Outcome: Ongoing, Progressing  Goal: Absence of Hospital-Acquired Illness or Injury  Outcome: Ongoing, Progressing  Goal: Optimal Comfort and Wellbeing  Outcome: Ongoing, Progressing  Goal: Readiness for Transition of Care  Outcome: Ongoing, Progressing     Problem: Impaired Wound Healing  Goal: Optimal Wound Healing  Outcome: Ongoing, Progressing     Problem: Fall Injury Risk  Goal: Absence of Fall and Fall-Related Injury  Outcome: Ongoing, Progressing     Problem: Fatigue  Goal: Improved Activity Tolerance  Outcome: Ongoing, Progressing     Problem: Heart Failure Comorbidity  Goal: Maintenance of Heart Failure Symptom Control  Outcome: Ongoing, Progressing     Problem: Hypertension Comorbidity  Goal: Blood Pressure in Desired Range  Outcome: Ongoing, Progressing     Problem: Dysrhythmia  Goal: Normalized Cardiac Rhythm  Outcome: Ongoing, Progressing     Problem: Skin Injury Risk Increased  Goal: Skin Health and Integrity  Outcome: Ongoing, Progressing

## 2023-12-19 NOTE — PLAN OF CARE
POC established. ST requires functional assessment with PT for development of personally relevant safety goals.     Problem: SLP  Goal: SLP Goal  Description: LTGs:  Pt will recall and use safety strategies for improved safety with ADLs.    STGs:  Pt will participate in functional assessment with ST and PT - goals to be updated accordingly  Outcome: Ongoing, Progressing

## 2023-12-20 NOTE — PROGRESS NOTES
Clinical Pharmacy TPN Monitoring and Adjustment     Please attach any pertinent meds to this ivent (i.e. sliding scale insulin, lipids, electrolyte boluses, maintenance fluids, etc.).    Day # 4 of PPN     Diet Ordered:   Indication:          Malnurished  Line access:       peripheral     ietitian and nutrition notes/recommendations:  1. Clinimix recs:  Clinimix 4.25/5 E @ 50 ml/hr to provide 408 kcal (24% est kcal needs) and 51 g Pro (60% pro needs).  If pt able to tolerate higher fluid volume, increase Clinimix 4.25/5 E to 85 ml/hr to provide 694 kcal, 87 g Pro, GIR 1.0.  This will meet 41% kcal needs, 100% protein needs.  2.  Monitor for signs of refeeding (Glu, Mag, Phos) and replace lytes as needed.    3.  Continue current diet as tolerated - honor food prefs as able  4.  Will provide Magic Cup BID (290 kcal and 9 gm protein per serving)  5.  When PEG placed, consult RD for tube feeding recs       Patient is potentially not a candidate for Clinimix w/ E if the following are elevated due to concentrations found in Clinimix E preparations:   K > 5.5 mmol/L   PO4 > 6.5 mg/dL   SCr > 1.8 mg/dL    Current TPN and lipid orders:   Clinimix-E 4.25 / 5  at 25ml/hr      Notable Labs:   Daily BMP, Mg, Phos.   Weekly LFT, TG, CRP, CMP, Prealbumin.     BMP:   Lab Results   Component Value Date    K 3.4 (L) 12/19/2023    CREATININE 0.76 12/19/2023      Replete K if < 3.5 mg/dL prior to initiating new TPN.     Phosphorous:   Lab Results   Component Value Date    CALCIUM 7.3 (L) 12/19/2023    PHOS 3.5 12/16/2023       Total Bili:   Lab Results   Component Value Date    BILITOT 0.3 12/19/2023       TGs:   Lab Results   Component Value Date    .60 (H) 06/26/2023     Lab Results   Component Value Date    TRIG 131 08/15/2023    TRIG 117 05/04/2021    TRIG 117 09/28/2020                  Plan:   Continue Clinimix-E  4.25 / 5 at 25ml/hr

## 2023-12-20 NOTE — PROGRESS NOTES
[facility] - Medical Surgical Unit  [facility] Medicine  Progress Note    Patient Name: Supa Carmen  MRN: [id number]  Patient Class: IP- Swing   Admission Date: 12/18/2023  Length of Stay: 2 days  Attending Physician: Eliane Aceves DO  Primary Care Provider: Randy Evans DO        Subjective:     Principal Problem:Physical deconditioning        HPI:  Patient is an 83y Male with history of Chron's disease, history of colon cancer, atrial fibrillation status post cardioversion who initially present to outpatient clinic on 12/14/23 for hypotension, edema, and general weakness.  On sotalol 120mg BID. Workup in ED unremarkable with the exception of an albumin of 1.2. Placed in observation overnight with albumin and lasix ordered to help with significant peripheral edema.       Overnight patient had minimal urine output. Bladder scan this morning demonstrated over 600ml of retained urine in bladder. Sandoval catheter placed. Patient and family state he has had very poor appetite in recent weeks and has been feeling progressively weaker. Will admit to inpatient for continued hypotension while medications are adjusted as well as to begin IV nutrition with plans for PEG tube placement early next week.     The patient will be admitted to swing bed today for continued therapy.  PEG tube insertion was postponed due to the patient's improvement.  He is now eating more and I placed him on Megace as well.  Currently he is just weak and requires more therapy.  No chest pain/SOB.  No fever/chills.  No N/V/D/C.  He will likely need some scheduled metoprolol but his pressures were on the low side so we are still monitoring especially since his previous dose of sotalol was too high.      Overview/Hospital Course:  12/19/23: Pt sitting in recliner with feet up. Significant swelling in legs. Oriented and pleasant. States his appetite is improving.   12/20/23: Rounded for weekly team meeting with family present in the  room. Progressing well with PT. OT planning for shower tomorrow. Possible DC tomorrow or Friday. CM sent for urology referral Chad Anders and f/u appts with Dr. Marroquin and PCP. Will also rx rolling walker for pt. He will go home on megace per nutrition recs.          Review of Systems   Constitutional:  Positive for activity change, appetite change and fatigue. Negative for fever.   Respiratory:  Negative for shortness of breath and wheezing.    Cardiovascular:  Positive for leg swelling. Negative for chest pain.   Gastrointestinal:  Negative for abdominal distention, abdominal pain, constipation and nausea.   Genitourinary:  Negative for difficulty urinating, dysuria and frequency.   Musculoskeletal:  Negative for arthralgias, joint swelling and myalgias.   Skin:  Positive for wound. Negative for rash.   Neurological:  Positive for weakness.   Psychiatric/Behavioral:  Negative for agitation, behavioral problems and confusion.      Objective:     Vital Signs (Most Recent):  Temp: 97.7 °F (36.5 °C) (12/20/23 1200)  Pulse: 86 (12/20/23 1200)  Resp: 18 (12/20/23 1200)  BP: 121/72 (12/20/23 0846)  SpO2: 98 % (12/20/23 1200) Vital Signs (24h Range):  Temp:  [97.7 °F (36.5 °C)-98.6 °F (37 °C)] 97.7 °F (36.5 °C)  Pulse:  [79-90] 86  Resp:  [18-22] 18  SpO2:  [94 %-98 %] 98 %  BP: (114-121)/(67-72) 121/72     Weight: 72.7 kg (160 lb 4.4 oz)  Body mass index is 26.67 kg/m².    Intake/Output Summary (Last 24 hours) at 12/20/2023 1438  Last data filed at 12/20/2023 0800  Gross per 24 hour   Intake 1387.77 ml   Output 900 ml   Net 487.77 ml           Physical Exam  Constitutional:       General: He is not in acute distress.     Appearance: Normal appearance. He is normal weight.   HENT:      Head: Normocephalic and atraumatic.      Nose: Nose normal.   Eyes:      Conjunctiva/sclera: Conjunctivae normal.   Cardiovascular:      Rate and Rhythm: Normal rate and regular rhythm.      Pulses: Normal pulses.      Heart sounds: Normal  heart sounds. No murmur heard.     No gallop.   Pulmonary:      Effort: Pulmonary effort is normal. No respiratory distress.      Breath sounds: No wheezing, rhonchi or rales.   Abdominal:      General: Abdomen is flat. Bowel sounds are normal. There is no distension.      Palpations: Abdomen is soft.      Tenderness: There is no abdominal tenderness.   Musculoskeletal:         General: No swelling or deformity. Normal range of motion.      Cervical back: Normal range of motion.      Right lower leg: Edema present.      Left lower leg: Edema present.      Comments: 1+ b/l legs.   Skin:     General: Skin is warm and dry.      Coloration: Skin is not jaundiced.      Findings: No rash.      Comments: Right forearm skin tear   Neurological:      General: No focal deficit present.      Mental Status: He is alert and oriented to person, place, and time.   Psychiatric:         Mood and Affect: Mood normal.         Behavior: Behavior normal.             Significant Labs: All pertinent labs within the past 24 hours have been reviewed.  Recent Lab Results         12/20/23  1033        Anion Gap 6.0       BUN 17.0       BUN/CREAT RATIO 21       Calcium 7.9       Chloride 107       CO2 24       Creatinine 0.80       eGFR >60       Glucose 82       Potassium 3.9       Sodium 137               Significant Imaging: I have reviewed all pertinent imaging results/findings within the past 24 hours.    Assessment/Plan:      * Physical deconditioning  Admit to swing bed  OOB  Therapy  Resume transfer meds  Labs as needed  12/20/23: Progressing well with PT. OT planning for shower tomorrow. Possible DC tomorrow or Friday. CM sent for urology referral Chad Anders and f/u appts with Dr. Marroquin and PCP. Will also rx rolling walker for pt. He will go home on megace per nutrition recs.       Hypokalemia  12/19/23: Gave Kcl 20meq po. CMP in am.  12/20/23: K normal.     Urinary retention  Urinary catheter in place. Urology referral made for  appt upon dc.       Severe protein-calorie malnutrition  Nutrition consulted. Most recent weight and BMI monitored-     Measurements:  Wt Readings from Last 1 Encounters:   12/20/23 72.7 kg (160 lb 4.4 oz)   Body mass index is 26.67 kg/m².    Patient has been screened and assessed by RD.  Weight Loss (Malnutrition): greater than 10% in 6 months  Energy Intake (Malnutrition): less than or equal to 50% for greater than or equal to 1 month  Subcutaneous Fat (Malnutrition): moderate depletion  Muscle Mass (Malnutrition): moderate depletion    Interventions/Recommendations (treatment strategy):    Continue Clinimix and megace    12/19/23: begin weaning clinimix and continue monitoring oral intake.  12/20/23: Climimix dc'd. Will continue megace upon dc per nutritionist recs.         Hypotension  Closely monitor.  12/19/23: restarting BB for hr control. Metoprolol tartrate 25mg bid. Will monitor bp response. Overall tolerating metoprolol 5mg iv prn.    12/20/23: resolved. Tolerating bb well.     A-fib  Sotolol was dc'd upon admission for concern for bb toxicity. Pt was given glucagon.   HR has been >150bpm at times. Restarting low dose bb. Metoprolol tartrate 25mg bid. Cardiologist is Dr. Lyle Marroquin. Will set up follow-up within 1-2 weeks of dc.   12/20/23 tolerating metoprolol 25mg bid well.       VTE Risk Mitigation (From admission, onward)           Ordered     enoxaparin injection 40 mg  Every 24 hours         12/19/23 0834     IP VTE HIGH RISK PATIENT  Once         12/18/23 1422     Place sequential compression device  Until discontinued         12/18/23 1422                    Discharge Planning   SRINI: 12/22/2023     Code Status: Full Code   Is the patient medically ready for discharge?:     Reason for patient still in hospital (select all that apply): Patient trending condition, Treatment, and PT / OT recommendations  Discharge Plan A: Home Health (Family/patient request - [facility])                  RUDDY  DO KIKI  Department of Hospital Medicine   [facility] - [facility]

## 2023-12-20 NOTE — NURSING
[facility] requested more clinicals. Clinicals sent via [facility] as requested.

## 2023-12-20 NOTE — PT/OT/SLP PROGRESS
"Speech Language Pathology Treatment    Patient Name:  Supa Carmen   MRN:  [id number]  Admitting Diagnosis: Physical deconditioning    Recommendations:                 General Recommendations:  Cognitive-linguistic therapy  Diet recommendations:  Regular Diet - IDDSI Level 7, Liquid Diet Level: Thin liquids - IDDSI Level 0   Aspiration Precautions:  none    General Precautions: Standard, fall  Communication strategies:   repeat as needed; Pt is hard of hearing     Assessment:     Supa Carmen is a 83 y.o. male with an SLP diagnosis of Cognitive-Linguistic Impairment.  He presents with poor recall, insight, and awareness to deficits.    Subjective     Pt participated in co-treatment with PT. Good use of humor throughout.     Patient goals: "go home tomorrow"    Pain/Comfort:  Pain Rating 1: 0/10    Respiratory Status: Room air    Objective:     Co treatment with PT targeting mobility with walker and SLP targeting recall and use of safety strategies within more functional situations. Co-treatment necessary for increased Pt safety to target more functional tasks for improved generalization. Pt educated on specific cognitive goals of task and relation of task to home management situations.     Pt required cueing x1 for correct sequencing of hand placement for initiating walker use. Safety strategies reviewed with Pt.     Has the patient been evaluated by SLP for swallowing?   Yes  Keep patient NPO? No   Current Respiratory Status:    room air     Goals:   Multidisciplinary Problems       SLP Goals          Problem: SLP    Goal Priority Disciplines Outcome   SLP Goal     SLP Ongoing, Progressing   Description: LTGs:  Pt will recall and use safety strategies for improved safety with ADLs.    STGs:  1. Pt will recall and use safety strategies in functional tasks to reduce fall risk with min cues.                        Plan:     Patient to be seen:  3 x/week   Plan of Care expires:  12/29/23  Plan of Care reviewed " with:  patient   SLP Follow-Up:  Yes       Discharge recommendations:  Low Intensity Therapy   Barriers to Discharge:   none    Time Tracking:     SLP Treatment Date:   12/20/23  Speech Start Time:  0902  Speech Stop Time:  0915     Speech Total Time (min):  13 min    Billable Minutes: Cog Tx 13min / 1 unit     Anjelica Pantoja MA, CCC-SLP  12/20/2023

## 2023-12-20 NOTE — NURSING
Weekly Staffing Report      Date Admitted: 12/18/2023 :   Staffing Date: 12/20/2023     Patient Active Problem List   Diagnosis    History of colon cancer    Primary hypertension    Establishing care with new doctor, encounter for    Neuropathy    A-fib    Crohn's disease of both small and large intestine without complication    Hypotension    Arteriosclerosis of coronary artery    Crohn's disease, unspecified, without complications    Diverticulosis    Former tobacco use    Gout, unspecified    H/O cardiac catheterization    HLD (hyperlipidemia)    Unspecified osteoarthritis, unspecified site    Anasarca    Bradycardia    Severe protein-calorie malnutrition    Weakness    Urinary retention    Physical deconditioning    Hypokalemia          Team Members Present:   Estefany Lim, RN     Nursing Present     Yes [x]    No []    Physical Therapy Present    Yes []    No [x]    Occupational Therapy Present     Yes [x]    No []    Speech Therapy Present    Yes [x]    No []    NA []    Dietary Present    Yes [x]    No []    Physician Present   [] Dr. Rosa    []     [] Dr. Tan    [x] Dr. Aceves     Family Present    [x] Adult Children    [x] Spouse    [] POA    [] Friend/ Caregiver    [] Other       Interdisciplinary Meeting Notes:  Urologist set up post discharge needed- preferrence of Dr. Ariel Anders. Referral sent via Epic. All follow up appointments made with cardiologist (Dr. Lyle Marroquin) and PCP. Home Health with [facility]. [facility] Will coordinate discharge plan as ordered per attending MD.     Anticipated discharge date of 12/21/2023    Please see Documented PT/OT/ST, Dietary, Nursing, and Physician notes for detailed treatment information.

## 2023-12-20 NOTE — PT/OT/SLP PROGRESS
Occupational Therapy  Treatment    Name: Supa Carmen    : 1940 (83 y.o.)  MRN: [id number]          TREATMENT SUMMARY AND RECOMMENDATIONS:      Subjective Assessment:   No complaints  Lethargic   x Awake, alert, cooperative  Uncooperative    Agitated x Flat affect    Appropriate  c/o fatigue   x Confused  Treated at bedside     Emotionally liable  Treated in gym/dept.    Impulsive  Other:       Pain/Comfort:  Pain Rating 1: 4/10  Location - Orientation 1: midline  Location 1: back  Pain Addressed 1: Reposition, Distraction (Changed pt's recliner and added pillow to seat.)      Therapy Precautions:   x Cognitive deficits  Spinal precautions    Collar - hard  Sternal precautions    Collar - soft   TLSO   x Fall risk  LSO    Hip precautions - posterior  Knee immobilizer    Hip precautions - anterior  WBAT    Impaired communication  Partial weightbearing    Oxygen  TTWB    PEG tube  NWB    Visual deficits x Other:Kahn cath    Hearing deficits           Vitals Value   x Room air      Oxygen (L)     Blood pressure     Heart rate         Treatment Objectives:     Mobility Training:    Mobility task Assist level Comments    Bed mobility     Balance training CGA Dynamic standing bal/noreen during toileting CGA with 1 UE support   Transfer Min    Functional mobility CGA Functional mobility in room CGA and verbal cuing for safety with RW   Sit to stand transitions     Other:       ADL Training:    ADL Assist level Comments    Feeding     Grooming/hygiene     Bathing     Upper body dressing     Lower body dressing     Toileting Min/dep ADL toileting activity performed. Pt able to performed hygiene with setup and verbal cuing after BM, kahn cath intact   Toilet transfer Min Transfer training perforemd; pt able to transfer onto toilet CGA, Min assist and verbal cuing required to stand using grab bar.   Adaptive equipment training     Other:           Additional Treatment:  Case conference discussed pt's LOC, DME needs,  and discharge plans for this week.     Assessment: Patient tolerated session fair.  Noted some light pink blood on wipes after BM.  Report given to RN that pt would benefit from hemorrhoid cream.    OT Plan: Cont POC      GOALS:   Multidisciplinary Problems       Occupational Therapy Goals          Problem: Occupational Therapy    Goal Priority Disciplines Outcome Interventions   Occupational Therapy Goal     OT, PT/OT Ongoing, Progressing    Description:   Patient will increase functional independence with ADLs by performing:    LE Dressing with Contact Guard Assistance.  Toileting from toilet with Stand-by Assistance for hygiene and clothing management.   Bathing from  shower chair/bench with Minimal Assistance.  Toilet transfer to toilet with Stand-by Assistance.                         Communication with Treatment Team:     Discharge Recommendations:    Discharge Equipment Recommendations:  shower chair  Barriers to discharge:  Decreased caregiver support      At end of treatment, patient remained:  x Up in chair     In bed   x With alarm activated    Bed rails up   x Call bell in reach    x Family/friends present    Restraints secured properly    In bathroom with CNA/RN notified    In gym with PT/PTA/tech   x Nurse aware    Other:         OT Date of Treatment: 12/20/23  OT Start Time: 1020  OT Stop Time: 1045  OT Total Time (min): 25 min    Billable Minutes:Self Care/Home Management 25 12/20/2023

## 2023-12-20 NOTE — PT/OT/SLP PROGRESS
"         Physical Therapy Treatment Note           Name: Supa Carmen    : 1940 (83 y.o.)  MRN: [id number]             Subjective Assessment:     No complaints  Lethargic   X Awake, alert, cooperative  Uncooperative    Agitated  c/o pain    Appropriate  c/o fatigue   X Confused  Treated at bedside     Emotionally labile  Treated in gym/dept.    Impulsive  Other:    Flat affect       Pain/Comfort:    Pain Rating 1: 0/10    Therapy Precautions:     X Cognitive deficits  Spinal precautions    Collar - hard  Sternal precautions    Collar - soft   TLSO   X Fall risk  LSO    Hip precautions - posterior  Knee immobilizer    Hip precautions - anterior  WBAT    Impaired communication  Partial weightbearing    Oxygen  TTWB    PEG tube  NWB    Visual deficits  Other:    Hearing deficits          Mobility Training:     Assist Level Assistive Device Comments    Bed Mobility SBA  Semi-supine to sitting at EOB performed with increased ease and less time required.    Sit to stand/Stand to sit SBA RW Sit to stand/stand to sit.  Reminders required for hand placement during ascent.    Transfers      Gait SBA RW Pt able to ambulate >400 ft with a step through gait pattern and slow but steady gait speed.  Limited foot clearance observed bilaterally, but improved compared to previous sessions.  Pt's overall safety has also improved, along with his endurance/activity tolerance.  No LOB noted with use of the RW.   Balance Training      Wheelchair Mobility      Stair Climbing      Car Transfer      Other:             Assessment:     Patient tolerated session well and was able to increase his gait distance. Pt was without complaints and stated that he was "feeling better".  PT feels that pt is safe to return home with family care and  services.  PT recommends continued use of the RW at this time.      GOALS:   Multidisciplinary Problems       Physical Therapy Goals          Problem: Physical Therapy    Goal Priority Disciplines " Outcome Goal Variances Interventions   Physical Therapy Goal     PT, PT/OT Ongoing, Progressing     Description: Patient will increase functional independence with mobility by performin. Supine to sit with Modified [place]  2. Sit to supine with Modified [place]  3. Sit to stand transfer with Modified [place]  4. Gait  x 400 feet with Modified [place] using Rolling Walker vs least restrictive AD.   5. Ascend/descend 3 stair with left Handrails Supervision using No Assistive Device.                            Discharge Recommendations:      24 hour care and     Discharge Equipment Recommendations:     none     At end of treatment, patient remained:     X Up in chair     In bed   X With alarm activated    Bed rails up   X Call bell in reach      Family/friends present     Restraints secured properly     In bathroom with CNA/RN notified     In gym with PT/PTA/tech     Nurse aware     Other:           PT Start Time: 859     PT Stop Time: 919  PT Total Time (min): 20 min     Billable Minutes: Gait Training 2023

## 2023-12-20 NOTE — PT/OT/SLP PROGRESS
Name: Supa Carmen    :  (83 y.o.)  MRN: [id number]            Case Conference     Case conference held with patient/family and care team to discuss progress, plan of care, and discharge planning. Communicated therapy progress with Pt, Pt's family, MD, RN, therapy clinicians, and case management. All questions answered.     Anjelica Pantoja MA, CCC-SLP  2023

## 2023-12-20 NOTE — PROGRESS NOTES
Inpatient Nutrition Assessment    Admit Date: 12/18/2023   Total duration of encounter: 2 days   Patient Age: 83 y.o.    Nutrition Recommendation/Prescription     Continue Heart Healthy diet as tolerated. Honor food preferences.  Continue to provide Magic Cup BID (290 kcal and 9 gm protein per serving)  Continue Megace as medically appropriate.       Communication of Recommendations: reviewed with provider, reviewed with nurse, reviewed with patient, and reviewed with family    Nutrition Assessment     Malnutrition Assessment/Nutrition-Focused Physical Exam    Malnutrition Context: chronic illness (12/20/23 1051)  Malnutrition Level: severe (12/20/23 1051)  Energy Intake (Malnutrition): less than or equal to 50% for greater than or equal to 1 month (12/20/23 1051)  Weight Loss (Malnutrition): greater than 10% in 6 months (12/20/23 1051)  Subcutaneous Fat (Malnutrition): moderate depletion (12/20/23 1051)  Orbital Region (Subcutaneous Fat Loss): moderate depletion  Upper Arm Region (Subcutaneous Fat Loss): moderate depletion     Muscle Mass (Malnutrition): moderate depletion (12/20/23 1051)  [place] Region (Muscle Loss): moderate depletion  Clavicle Bone Region (Muscle Loss): moderate depletion  Clavicle and Acromion Bone Region (Muscle Loss): moderate depletion                          A minimum of two characteristics is recommended for diagnosis of either severe or non-severe malnutrition.    Chart Review    Reason Seen: continuous nutrition monitoring/swing bed admit    Malnutrition Screening Tool Results   Have you recently lost weight without trying?: Yes: 2-13 lbs  Have you been eating poorly because of a decreased appetite?: Yes   MST Score: 2   Diagnosis:  Physical Deconditioning    Relevant Medical History: Chron's disease, history of colon cancer, atrial fibrillation status post cardioversion        Past Medical History:   Diagnosis Date    Atrial fibrillation      CAD (coronary artery disease)       Chemotherapy-induced neuropathy      Diverticulosis      DJD (degenerative joint disease)      Gout      History of colon cancer, stage III 05/20/2011    HLD (hyperlipidemia)      HTN (hypertension)      Personal history of colonic polyps 10/24/2017       Scheduled Medications:  enoxparin, 40 mg, Q24H (prophylaxis, 1700)  megestroL, 200 mg, Daily  metoprolol tartrate, 25 mg, BID  mupirocin, , BID  sucralfate, 1 g, QID (AC & HS)    Continuous Infusions:   PRN Medications: acetaminophen, gabapentin, melatonin, metoprolol, ondansetron, sodium chloride 0.9%    Calorie Containing IV Medications: no significant kcals from medications at this time    Recent Labs   Lab 12/14/23  1643 12/15/23  0502 12/16/23  0445 12/17/23  0448 12/18/23  0445 12/19/23  0422 12/20/23  1033    139 140 142 139 137 137   K 4.8 3.8 3.1* 3.0* 3.6 3.4* 3.9   CALCIUM 7.0* 7.1* 7.1* 7.1* 7.2* 7.3* 7.9*   PHOS  --   --  3.5  --   --   --   --    MG  --   --  1.80 1.80 1.80 1.70  --    CHLORIDE 111* 112* 112* 113* 112* 110* 107   CO2 21* 20* 22* 22* 21* 23 24   BUN 33.0* 34.0* 35.0* 28.0* 23.0 22.0 17.0   CREATININE 1.82* 1.80* 1.46* 1.03 0.85 0.76 0.80   EGFRNORACEVR 36 37 47 >60 >60 >60 >60   GLUCOSE 85 112 91 80* 101 93 82   BILITOT 0.2  --  0.5 0.5 0.4 0.3  --    ALKPHOS 76  --  39* 39* 37* 39*  --    ALT 19  --  8 6 8 9  --    AST 32  --  15 13 15 18  --    ALBUMIN 1.2*  --  2.8* 2.2* 2.0* 1.8*  --    WBC 8.37 7.74 5.23 4.71 6.33 4.96  --    HGB 14.5 12.1* 8.4* 10.7* 12.4* 11.8*  --    HCT 45.4 36.6* 25.7* 32.5* 37.5* 35.8*  --      Nutrition Orders:  Diet heart healthy  Dietary nutrition supplements Magic Cup - Vanilla; BID    Appetite/Oral Intake: poor/25-50% of meals  Factors Affecting Nutritional Intake: decreased appetite  Food/Baptism/Cultural Preferences: none reported  Food Allergies: none reported  Last Bowel Movement: 12/19/23  Wound(s):  Intact    Comments    (12/20) Pt with poor to fair appetite and intake. Consumes 25% of  "most meals. States he "eats what I can". Denied N/V/C/D. No issues chewing or swallowing. He is able to feed self. Pt does need encouragement at all meals. He was drinking Ensure at home. Pt reports consuming Magic Cup BID. Will provide family with high calorie/high protein foods education.     Anthropometrics    Height: 5' 5" (165.1 cm),    Last Weight: 72.7 kg (160 lb 4.4 oz) (23 0521), Weight Method: Bed Scale  BMI (Calculated): 26.7  BMI Classification: overweight (BMI 25-29.9)        Ideal Body Weight (IBW), Male: 136 lb     % Ideal Body Weight, Male (lb): 113.96 %                 Usual Body Weight (UBW), k.3 kg  % Usual Body Weight: 94.25     Usual Weight Provided By: patient    Wt Readings from Last 5 Encounters:   23 72.7 kg (160 lb 4.4 oz)   23 70.3 kg (154 lb 15.7 oz)   23 71.2 kg (157 lb)   23 71.2 kg (156 lb 15.5 oz)   23 72.1 kg (159 lb)     Weight Change(s) Since Admission: new swing bed  Wt Readings from Last 1 Encounters:   23 0521 72.7 kg (160 lb 4.4 oz)   23 0442 72.7 kg (160 lb 4.4 oz)   23 1545 70.3 kg (154 lb 15.7 oz)   23 1544 70.3 kg (154 lb 15.7 oz)   23 1515 70.3 kg (154 lb 15.7 oz)   Admit Weight: 70.3 kg (154 lb 15.7 oz) (23 1515), Weight Method: Bed Scale    Estimated Needs    Weight Used For Calorie Calculations: 72.7 kg (160 lb 4.4 oz)  Energy Calorie Requirements (kcal): 1818 kcal (25 kcal/kg)  Energy Need Method: Kcal/kg  Weight Used For Protein Calculations: 70.7 kg (155 lb 13.8 oz)  Protein Requirements: 85 gm (1.2 gm/kg)  Fluid Requirements (mL): 1818 ml (1 ml/kcal)    Enteral Nutrition     Patient not receiving enteral nutrition at this time.    Parenteral Nutrition     Patient not receiving parenteral nutrition support at this time.    Evaluation of Received Nutrient Intake    Calories: not meeting estimated needs  Protein: not meeting estimated needs    Patient Education     Not applicable.    Nutrition " Diagnosis     PES: Inadequate oral intake related to chronic illness as evidenced by predicted suboptimal energy intake (<50%of estimated needs met). (new)     PES: Severe chronic disease or condition related malnutrition related to inability to consume sufficient nutrients as evidenced by less than or equal to 50% needs met for greater than or equal to 1 month, moderate fat depletion, moderate muscle depletion, and 10% weight loss in 6 months. (new)    Nutrition Interventions     Intervention(s): general/healthful diet, commercial beverage, prescription medication, and collaboration with other providers    Goal: Meet greater than 80% of nutritional needs by follow-up. (new)  Goal: Maintain weight throughout hospitalization. (new)    Nutrition Goals & Monitoring     Dietitian will monitor: food and beverage intake, weight, electrolyte/renal panel, glucose/endocrine profile, and gastrointestinal profile    Nutrition Risk/Follow-Up: high (follow-up in 1-4 days)   Please consult if re-assessment needed sooner.

## 2023-12-21 PROBLEM — E87.6 HYPOKALEMIA: Status: RESOLVED | Noted: 2023-01-01 | Resolved: 2023-01-01

## 2023-12-21 NOTE — PT/OT/SLP DISCHARGE
Physical Therapy Discharge Summary    Name: Supa Carmen  MRN: [id number]   Principal Problem: Physical deconditioning     Patient Discharged from acute Physical Therapy on 2023.  Please refer to prior PT noted date on 2023 for functional status.     Assessment:     Patient appropriate for care in another setting.    Objective:     GOALS:   Multidisciplinary Problems       Physical Therapy Goals          Problem: Physical Therapy    Goal Priority Disciplines Outcome Goal Variances Interventions   Physical Therapy Goal     PT, PT/OT Adequate for Care Transition     Description: Patient will increase functional independence with mobility by performin. Supine to sit with Modified [place]  2. Sit to supine with Modified [place]  3. Sit to stand transfer with Modified [place]  4. Gait  x 400 feet with Modified [place] using Rolling Walker vs least restrictive AD.   5. Ascend/descend 3 stair with left Handrails Supervision using No Assistive Device.                          Reasons for Discontinuation of Therapy Services  Transfer to alternate level of care.      Plan:     Patient Discharged to: Home with Home Health Service.      2023

## 2023-12-21 NOTE — PLAN OF CARE
Problem: Adult Inpatient Plan of Care  Goal: Plan of Care Review  Outcome: Met  Goal: Patient-Specific Goal (Individualized)  Outcome: Met  Goal: Absence of Hospital-Acquired Illness or Injury  Outcome: Met  Goal: Optimal Comfort and Wellbeing  Outcome: Met  Goal: Readiness for Transition of Care  Outcome: Met     Problem: Impaired Wound Healing  Goal: Optimal Wound Healing  Outcome: Met     Problem: Fall Injury Risk  Goal: Absence of Fall and Fall-Related Injury  Outcome: Met     Problem: Fatigue  Goal: Improved Activity Tolerance  Outcome: Met     Problem: Heart Failure Comorbidity  Goal: Maintenance of Heart Failure Symptom Control  Outcome: Met     Problem: Hypertension Comorbidity  Goal: Blood Pressure in Desired Range  Outcome: Met     Problem: Dysrhythmia  Goal: Normalized Cardiac Rhythm  Outcome: Met     Problem: Skin Injury Risk Increased  Goal: Skin Health and Integrity  Outcome: Met

## 2023-12-21 NOTE — DISCHARGE SUMMARY
[facility] - Medical Surgical Unit  [facility]  Discharge Summary      Patient Name: Supa Carmen  MRN: [id number]  JETHRO: [id number]  Patient Class: IP- Swing  Admission Date: 12/18/2023  Hospital Length of Stay: 3 days  Discharge Date and Time:  12/21/2023 10:24 AM  Attending Physician: Eliane Aceves DO   Discharging Provider: ELIANE SWANSON DO  Primary Care Provider: Randy Evans DO    Primary Care Team: Networked reference to record PCT     HPI:   Patient is an 83y Male with history of Chron's disease, history of colon cancer, atrial fibrillation status post cardioversion who initially present to outpatient clinic on 12/14/23 for hypotension, edema, and general weakness.  On sotalol 120mg BID. Workup in ED unremarkable with the exception of an albumin of 1.2. Placed in observation overnight with albumin and lasix ordered to help with significant peripheral edema.       Overnight patient had minimal urine output. Bladder scan this morning demonstrated over 600ml of retained urine in bladder. Sandoval catheter placed. Patient and family state he has had very poor appetite in recent weeks and has been feeling progressively weaker. Will admit to inpatient for continued hypotension while medications are adjusted as well as to begin IV nutrition with plans for PEG tube placement early next week.     The patient will be admitted to swing bed today for continued therapy.  PEG tube insertion was postponed due to the patient's improvement.  He is now eating more and I placed him on Megace as well.  Currently he is just weak and requires more therapy.  No chest pain/SOB.  No fever/chills.  No N/V/D/C.  He will likely need some scheduled metoprolol but his pressures were on the low side so we are still monitoring especially since his previous dose of sotalol was too high.      * No surgery found *      Hospital Course:   12/19/23: Pt sitting in recliner with feet up. Significant swelling in  "legs. Oriented and pleasant. States his appetite is improving.   12/20/23: Rounded for weekly team meeting with family present in the room. Progressing well with PT. OT planning for shower tomorrow. Possible DC tomorrow or Friday. CM sent for urology referral Chad Anders and f/u appts with Dr. Marroquin and PCP. Will also rx rolling walker for pt. He will go home on megace per nutrition recs.    12/20/23: OT went well this morning. Will DC home today with HH. HE will have urology and cardiology follow-up. Will DC on 1 month of megace per nutritionist's recs. However, PCP will need to re-evaluate the need/benefits. See the following possible adverse effects of megace in elderly:   Effect:  Cardiovascular-Avoid use in the elderly. Increased thrombotic event risk.    Renal-Primarily renally excreted which may further increase ADR risks in elderly.    General- Little evidence to support use for weight gain. May induce vaginal bleeding in females.    Organ systems affected by interaction: Cardiovascular    MEGESTROL (APPETITE STIMULANT) is included in the following "potentially harmful drugs in the elderly" lists: STOPP, HEDIS, BEERS.    Sending home on new metoprolol 25mg bid. May need to be increased to 50mg bid. Will defer to pcp and cards.     PE:   General: alert.  HEENT: NC, AT.  Resp: CTAB.  Cardio: RRR, no m/r/g. 2+ edema b/l LE up through mid shin.  Abd: soft, NT, ND, + BS x 4       Goals of Care Treatment Preferences:  Code Status: Full Code      Consults:   Consults (From admission, onward)          Status Ordering Provider     IP consult to case management  Once        Provider:  (Not yet assigned)    Acknowledged ANTHONY MORALES            Cardiac/Vascular  Hypotension  Closely monitor.  12/19/23: restarting BB for hr control. Metoprolol tartrate 25mg bid. Will monitor bp response. Overall tolerating metoprolol 5mg iv prn.    12/20/23: resolved. Tolerating bb well.     A-fib  Sotolol was dc'd upon " "admission for concern for bb toxicity. Pt was given glucagon.   HR has been >150bpm at times. Restarting low dose bb. Metoprolol tartrate 25mg bid. Cardiologist is Dr. Lyle Marroquin. Will set up follow-up within 1-2 weeks of dc.   12/20/23 tolerating metoprolol 25mg bid well.   12/21/23: Will dc on metoprolol tartrate 25mg bid. Will defer further titration to PCP/cards. Would likely tolerate 50mg bid.    Renal/  Urinary retention  Urinary catheter in place. Urology referral made for appt upon dc.       Endocrine  Severe protein-calorie malnutrition  Nutrition consulted. Most recent weight and BMI monitored-     Measurements:  Wt Readings from Last 1 Encounters:   12/21/23 70.4 kg (155 lb 3.3 oz)   Body mass index is 25.83 kg/m².    Patient has been screened and assessed by RD.  Weight Loss (Malnutrition): greater than 10% in 6 months  Energy Intake (Malnutrition): less than or equal to 50% for greater than or equal to 1 month  Subcutaneous Fat (Malnutrition): moderate depletion  Muscle Mass (Malnutrition): moderate depletion    Interventions/Recommendations (treatment strategy):    Continue Clinimix and megace    12/19/23: begin weaning clinimix and continue monitoring oral intake.  12/20/23: Climimix dc'd. Will continue megace upon dc per nutritionist recs.  12/21/23: Will DC on 1 month of megace per nutritionist's recs. However, PCP will need to re-evaluate the need/benefits. See the following possible adverse effects of megace in elderly:   Effect:  Cardiovascular-Avoid use in the elderly. Increased thrombotic event risk.    Renal-Primarily renally excreted which may further increase ADR risks in elderly.    General- Little evidence to support use for weight gain. May induce vaginal bleeding in females.    Organ systems affected by interaction: Cardiovascular    MEGESTROL (APPETITE STIMULANT) is included in the following "potentially harmful drugs in the elderly" lists: STOPP, HEDIS, BEERS.         Other  * Physical " "deconditioning  Admit to swing bed  OOB  Therapy  Resume transfer meds  Labs as needed  12/20/23: Progressing well with PT. OT planning for shower tomorrow. Possible DC tomorrow or Friday. CM sent for urology referral Chad Anders and f/u appts with Dr. Marroquin and PCP. Will also rx rolling walker for pt. He will go home on megace per nutrition recs.   12/21/23: DC home with HH today.        Final Active Diagnoses:    Diagnosis Date Noted POA    PRINCIPAL PROBLEM:  Physical deconditioning [R53.81] 12/18/2023 Yes    Severe protein-calorie malnutrition [E43] 12/15/2023 Yes    Urinary retention [R33.9] 12/15/2023 Yes    Hypotension [I95.9] 07/07/2023 Yes    A-fib [I48.91] 06/29/2023 Yes      Problems Resolved During this Admission:    Diagnosis Date Noted Date Resolved POA    Hypokalemia [E87.6] 12/19/2023 12/21/2023 No       Discharged Condition: good    Disposition: Home-Health Care [facility]    Follow Up:   Follow-up Information       Lyle Marroquin MD. Go on 1/8/2024.    Specialty: Cardiology  Why: @8:40am follow up appt  Contact information:  [address] [facility] [address]  [phone]               Randy Evans DO. Go on 12/27/2023.    Specialty: Family Medicine  Why: @2:45pm follow up appt  Contact information:  [address] [place] [id number]  [phone]                           Patient Instructions:      WALKER FOR HOME USE     Order Specific Question Answer Comments   Type of Walker: Adult (5'4"-6'6")    With wheels? Yes    Height: 5' 5" (1.651 m)    Weight: 70.4 kg (155 lb 3.3 oz)    Length of need (1-99 months): 99    Please check all that apply: Patient is unable to safely ambulate without equipment.    Please check all that apply: Patient's condition impairs ambulation.      Ambulatory referral/consult to Home Health   Standing Status: Future   Referral Priority: Routine Referral Type: Home Health   Referral Reason: Specialty Services Required   Requested Specialty: Home Health " Services   Number of Visits Requested: 1     Ambulatory referral/consult to Urology   Standing Status: Future   Referral Priority: Routine Referral Type: Consultation   Referral Reason: Patient Preference   Referred to Provider: BÁRBARA VAUGHAN Requested Specialty: Urology   Number of Visits Requested: 1       Significant Diagnostic Studies: Labs: All labs within the past 24 hours have been reviewed    Pending Diagnostic Studies:       None           Medications:  Reconciled Home Medications:      Medication List        START taking these medications      megestroL 400 mg/10 mL (10 mL) Susp  Commonly known as: MEGACE  Take 5 mLs (200 mg total) by mouth once daily.  Start taking on: December 22, 2023     metoprolol tartrate 25 MG tablet  Commonly known as: LOPRESSOR  Take 1 tablet (25 mg total) by mouth 2 (two) times daily.            CONTINUE taking these medications      apixaban 5 mg Tab  Commonly known as: ELIQUIS  Take 1 tablet (5 mg total) by mouth 2 (two) times a day.     gabapentin 300 MG capsule  Commonly known as: NEURONTIN  Take 1 capsule (300 mg total) by mouth 3 (three) times daily.     multivitamin per tablet  Commonly known as: THERAGRAN  Take 1 tablet by mouth once daily.            STOP taking these medications      sotaloL 120 MG Tab  Commonly known as: BETAPACE              Indwelling Lines/Drains at time of discharge:   Lines/Drains/Airways       Drain  Duration                  Urethral Catheter 12/15/23 [address] 16 Fr. 5 days                    Time spent on the discharge of patient: 33 minutes         RUDDY SWANSON DO  Department of Hospital Medicine  [facility]

## 2023-12-21 NOTE — ASSESSMENT & PLAN NOTE
"Nutrition consulted. Most recent weight and BMI monitored-     Measurements:  Wt Readings from Last 1 Encounters:   12/21/23 70.4 kg (155 lb 3.3 oz)   Body mass index is 25.83 kg/m².    Patient has been screened and assessed by RD.  Weight Loss (Malnutrition): greater than 10% in 6 months  Energy Intake (Malnutrition): less than or equal to 50% for greater than or equal to 1 month  Subcutaneous Fat (Malnutrition): moderate depletion  Muscle Mass (Malnutrition): moderate depletion    Interventions/Recommendations (treatment strategy):    Continue Clinimix and megace    12/19/23: begin weaning clinimix and continue monitoring oral intake.  12/20/23: Climimix dc'd. Will continue megace upon dc per nutritionist recs.  12/21/23: Will DC on 1 month of megace per nutritionist's recs. However, PCP will need to re-evaluate the need/benefits. See the following possible adverse effects of megace in elderly:   Effect:  Cardiovascular-Avoid use in the elderly. Increased thrombotic event risk.    Renal-Primarily renally excreted which may further increase ADR risks in elderly.    General- Little evidence to support use for weight gain. May induce vaginal bleeding in females.    Organ systems affected by interaction: Cardiovascular    MEGESTROL (APPETITE STIMULANT) is included in the following "potentially harmful drugs in the elderly" lists: STOPP, HEDIS, BEERS.       "
12/19/23: Gave Kcl 20meq po. CMP in am.  12/20/23: K normal.   
Admit to swing bed  OOB  Therapy  Resume transfer meds  Labs as needed    
Admit to swing bed  OOB  Therapy  Resume transfer meds  Labs as needed  12/20/23: Progressing well with PT. OT planning for shower tomorrow. Possible DC tomorrow or Friday. CM sent for urology referral Chad Anders and f/u appts with Dr. Marroquin and PCP. Will also rx rolling walker for pt. He will go home on megace per nutrition recs.     
Admit to swing bed  OOB  Therapy  Resume transfer meds  Labs as needed  12/20/23: Progressing well with PT. OT planning for shower tomorrow. Possible DC tomorrow or Friday. CM sent for urology referral Chad Anders and f/u appts with Dr. Marroquin and PCP. Will also rx rolling walker for pt. He will go home on megace per nutrition recs.   12/21/23: DC home with HH today.    
Closely monitor    
Closely monitor.  12/19/23: restarting BB for hr control. Metoprolol tartrate 25mg bid. Will monitor bp response. Overall tolerating metoprolol 5mg iv prn.    
Closely monitor.  12/19/23: restarting BB for hr control. Metoprolol tartrate 25mg bid. Will monitor bp response. Overall tolerating metoprolol 5mg iv prn.    12/20/23: resolved. Tolerating bb well.   
Closely monitor.  12/19/23: restarting BB for hr control. Metoprolol tartrate 25mg bid. Will monitor bp response. Overall tolerating metoprolol 5mg iv prn.    12/20/23: resolved. Tolerating bb well.   
Gave Kcl 20meq po. CMP in am.  
Nutrition consulted. Most recent weight and BMI monitored-     Measurements:  Wt Readings from Last 1 Encounters:   12/18/23 70.3 kg (154 lb 15.7 oz)   There is no height or weight on file to calculate BMI.    Patient has been screened and assessed by RD.    Malnutrition Type:  Context:    Level:      Malnutrition Characteristic Summary:       Interventions/Recommendations (treatment strategy):    Continue Clinimix and megace       
Nutrition consulted. Most recent weight and BMI monitored-     Measurements:  Wt Readings from Last 1 Encounters:   12/19/23 72.7 kg (160 lb 4.4 oz)   Body mass index is 26.67 kg/m².    Patient has been screened and assessed by RD.       Interventions/Recommendations (treatment strategy):    Continue Clinimix and megace    12/19/23: begin weaning clinimix and continue monitoring oral intake.       
Nutrition consulted. Most recent weight and BMI monitored-     Measurements:  Wt Readings from Last 1 Encounters:   12/20/23 72.7 kg (160 lb 4.4 oz)   Body mass index is 26.67 kg/m².    Patient has been screened and assessed by RD.  Weight Loss (Malnutrition): greater than 10% in 6 months  Energy Intake (Malnutrition): less than or equal to 50% for greater than or equal to 1 month  Subcutaneous Fat (Malnutrition): moderate depletion  Muscle Mass (Malnutrition): moderate depletion    Interventions/Recommendations (treatment strategy):    Continue Clinimix and megace    12/19/23: begin weaning clinimix and continue monitoring oral intake.  12/20/23: Climimix dc'd. Will continue megace upon dc per nutritionist recs.       
Sotolol was dc'd upon admission for concern for bb toxicity. Pt was given glucagon.   HR has been >150bpm at times. Restarting low dose bb. Metoprolol tartrate 25mg bid. Cardiologist is Dr. Lyle Marroquin.   
Sotolol was dc'd upon admission for concern for bb toxicity. Pt was given glucagon.   HR has been >150bpm at times. Restarting low dose bb. Metoprolol tartrate 25mg bid. Cardiologist is Dr. Lyle Marroquin. Will set up follow-up within 1-2 weeks of dc.   12/20/23 tolerating metoprolol 25mg bid well.   
Sotolol was dc'd upon admission for concern for bb toxicity. Pt was given glucagon.   HR has been >150bpm at times. Restarting low dose bb. Metoprolol tartrate 25mg bid. Cardiologist is Dr. Lyle Marroquin. Will set up follow-up within 1-2 weeks of dc.   12/20/23 tolerating metoprolol 25mg bid well.   12/21/23: Will dc on metoprolol tartrate 25mg bid. Will defer further titration to PCP/cards. Would likely tolerate 50mg bid.  
Urinary catheter in place. Urology referral made for appt upon dc.     
Urinary catheter in place. Urology referral made for appt upon dc.     
English

## 2023-12-21 NOTE — PROGRESS NOTES
12/21/23 0705   Final Note   Assessment Type Final Discharge Note   Anticipated Discharge Disposition Home-Health   What phone number can be called within the next 1-3 days to see how you are doing after discharge? [phone]   Hospital Resources/Appts/Education Provided Appointments scheduled and added to AVS;Post-Acute resouces added to AVS   Post-Acute Status   Coverage Medicare   Discharge Delays None known at this time     Plan to go home with spouse and daughter once discharged with [facility] (accepted). Awaiting [facility] for delivery of rolling walker.

## 2023-12-21 NOTE — PT/OT/SLP PROGRESS
Occupational Therapy  Treatment    Name: Supa Carmen    : 1940 (83 y.o.)  MRN: [id number]          TREATMENT SUMMARY AND RECOMMENDATIONS:      Subjective Assessment:   No complaints  Lethargic   x Awake, alert, cooperative  Uncooperative    Agitated x Flat affect    Appropriate  c/o fatigue   x Confused  Treated at bedside     Emotionally liable  Treated in gym/dept.    Impulsive x Other: Pt conts to report rectal discomfort when sitting.       Pain/Comfort:         Therapy Precautions:   x Cognitive deficits  Spinal precautions    Collar - hard  Sternal precautions    Collar - soft   TLSO   x Fall risk  LSO    Hip precautions - posterior  Knee immobilizer    Hip precautions - anterior  WBAT    Impaired communication  Partial weightbearing    Oxygen  TTWB    PEG tube  NWB    Visual deficits  Other:   x Hearing deficits           Vitals Value   x Room air      Oxygen (L)     Blood pressure     Heart rate         Treatment Objectives:     Mobility Training:    Mobility task Assist level Comments    Bed mobility Min Transfer training supine to sit min assist with upper trunk using bed rail with HOB elevated.   Balance training SBA Dynamic standing bal training during ADLs SBA with 1 UE support .   Transfer CGA Transfer training bed>wc>TTB in walk-in shower>wc>recliner CGA and verbal cuing for proper hand placement and safety using RW.   Functional mobility CGA Functional mobility in<>shower room approx 7 ft CGA and verbal cuing using RW   Sit to stand transitions CGA Transfer training sit<>stand 8xs during ADLs CGA and verbal cuing for proper hand placement.   Other:       ADL Training:    ADL Assist level Comments    Feeding     Grooming/hygiene Max ADL grooming activity shaving with razor max assist.   Bathing Min ADL bathing training performed sitting on TTB uisng HH shower.  Pt able to bathe and dry self with min assist, extended time, and verbal cuing to initate and sequence.  Pt required tactile  assist with back, bottom, and bilat feet    Upper body dressing Min Tie gown   Lower body dressing Max Pt required assist to doff/darin pull up secondary to catheter.   Toileting Dep Kahn cath   Toilet transfer     Adaptive equipment training     Other:           Additional Treatment:  Reinforced call bell function and call before you fall.    Assessment: Patient tolerated session fair.  Performed showering activity with min assist, but pt continues to require max assist with LE dressing secondary to kahn cath and cognitive issues.  Cont to recommend 24 hour supervision/assist.    OT Plan: Possible dc today/tomorrow.      GOALS:   Multidisciplinary Problems       Occupational Therapy Goals          Problem: Occupational Therapy    Goal Priority Disciplines Outcome Interventions   Occupational Therapy Goal     OT, PT/OT Ongoing, Progressing    Description:   Patient will increase functional independence with ADLs by performing:    LE Dressing with Contact Guard Assistance.  Toileting from toilet with Stand-by Assistance for hygiene and clothing management.   Bathing from  shower chair/bench with Minimal Assistance.  Toilet transfer to toilet with Stand-by Assistance.                         Communication with Treatment Team:     Discharge Recommendations:    Discharge Equipment Recommendations:  shower chair  Barriers to discharge:  Decreased caregiver support      At end of treatment, patient remained:  x Up in chair     In bed   x With alarm activated    Bed rails up   x Call bell in reach     Family/friends present    Restraints secured properly    In bathroom with CNA/RN notified    In gym with PT/PTA/tech   x Nurse aware    Other:         OT Date of Treatment: 12/21/23  OT Start Time: 0820  OT Stop Time: 0900  OT Total Time (min): 40 min    Billable Minutes:Self Care/Home Management 30  Therapeutic Activity 10      12/21/2023

## 2023-12-21 NOTE — PT/OT/SLP DISCHARGE
Occupational Therapy Discharge Summary    Supa Carmen  MRN: [id number]   Principal Problem: Physical deconditioning      Patient Discharged from acute Occupational Therapy on 12/21/23.  Please refer to prior OT note dated 12/21/23 for functional status.    Assessment:      Patient appropriate for care in another setting.    Objective:     GOALS:   Multidisciplinary Problems       Occupational Therapy Goals          Problem: Occupational Therapy    Goal Priority Disciplines Outcome Interventions   Occupational Therapy Goal     OT, PT/OT Ongoing, Progressing    Description:   Patient will increase functional independence with ADLs by performing:    LE Dressing with Contact Guard Assistance.(Goal Not Met)  Toileting from toilet with Stand-by Assistance for hygiene and clothing management. (Goal Not Met)  Bathing from  shower chair/bench with Minimal Assistance.(Goal Met)  Toilet transfer to toilet with Stand-by Assistance.(Goal Not Met)                         Reasons for Discontinuation of Therapy Services  Transfer to alternate level of care.      Plan:     Patient Discharged to: Home with Home Health Service    12/21/2023

## 2023-12-22 NOTE — PROGRESS NOTES
C3 nurse spoke with Supa Carmen's spouse for a TCC post hospital discharge follow up call. Pts spouse denies any new symptoms or complaints but the walker has yet to be delivered. C3 nurse messaged the inpatient  via [employer] about walker and is currently awaiting a response. The patient has a scheduled Followup with his PCP, Randy Evans MD on 12/27/23 at 1445. Message routed to Randy Evans DO requesting a visit type change to 'HOSFU.'

## 2023-12-22 NOTE — PROGRESS NOTES
C3 nurse messaged inana Lim via [employer] about the pts walker. Margaret was told that the pt already had one and they are backed up until the first. C3 nurse called the pt's spouse back and she stated PT loaned them one until the DME company provided theirs. Carina stated she would contact a different DME company and that the pts spouse should expect a call within the next few days. She verbalized understanding.
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
## 2023-12-27 NOTE — ED PROVIDER NOTES
Encounter Date: 12/27/2023       History     Chief Complaint   Patient presents with    Altered Mental Status    Aggressive Behavior    Fatigue     Confusion, combative and fatigue since being discharged from hospital per family   Sent by Dr. Evans for medical eval vs dory-psych placement.       HPI    83-year-old male with a past medical history of AFib on Eliquis, Crohn's disease, physical deconditioning as well as other diagnosis as delineated below presents emergency department for possible UTI.  He was recently discharged from the hospital on Friday, 5 days ago.  Went to PCP office for an appointment and was sent here because supposedly did not feel like he was safe to be home.  He has been getting aggravated with family members since going home.  He is very unsteady on his feet.  Did not fall.  They are concerned he may have a UTI.  Patient denies any complaints.    Review of patient's allergies indicates:  No Known Allergies  Past Medical History:   Diagnosis Date    Anasarca     Atrial fibrillation     Bradycardia     CAD (coronary artery disease)     Chemotherapy-induced neuropathy     Crohn's disease of both small and large intestine     Diverticulosis     DJD (degenerative joint disease)     Former tobacco use     Gout     H/O cardiac catheterization     History of colon cancer, stage III 05/20/2011    HLD (hyperlipidemia)     HTN (hypertension)     Hypocalcemia     Hypokalemia     Hypotension     Neuropathy     Osteoarthritis     Personal history of colonic polyps 10/24/2017    s/p polypectomy - Dr Kenny Vargas    Physical deconditioning     Severe protein-calorie malnutrition     Urinary retention     Weakness      Past Surgical History:   Procedure Laterality Date    APPENDECTOMY      CARDIAC CATHETERIZATION  10/14/2009    Dr Tim Bryan    COLONOSCOPY N/A 06/27/2023    Procedure: COLONOSCOPY;  Surgeon: Kenny Vargas MD;  Location: [facility];  Service: Gastroenterology;  Laterality: N/A;     COLONOSCOPY W/ BIOPSIES AND POLYPECTOMY  05/30/2012    3mm polyp - Dr Kenny Vargas    COLONOSCOPY W/ BIOPSIES AND POLYPECTOMY  10/24/2017    2 polyps - Dr Kenny Vargas    COLONOSCOPY W/ BIOPSIES AND POLYPECTOMY  07/22/2014    2 polyps - Dr Kenny Vargas    HEMICOLECTOMY, RIGHT, LAPAROSCOPIC  05/20/2011    Dr Elijah Huntley    LUMBAR DISCECTOMY      MEDIPORT INSERTION, SINGLE  06/16/2011    Dr Elijah Huntley     Family History   Problem Relation Age of Onset    No Known Problems Mother     Other Father 42        intestinal rupture - Cause of death     Social History     Tobacco Use    Smoking status: Former     Types: Cigarettes    Smokeless tobacco: Never   Substance Use Topics    Alcohol use: Not Currently    Drug use: Never     Review of Systems   Constitutional:  Negative for fever.   HENT:  Negative for sore throat.    Respiratory:  Negative for cough and shortness of breath.    Cardiovascular:  Negative for chest pain.   Gastrointestinal:  Negative for abdominal pain, constipation, diarrhea, nausea and vomiting.   Genitourinary:  Negative for dysuria.   Musculoskeletal:  Negative for back pain.   Skin:  Negative for rash.   Neurological:  Negative for weakness and headaches.   Hematological:  Does not bruise/bleed easily.   Psychiatric/Behavioral:  Positive for behavioral problems.    All other systems reviewed and are negative.      Physical Exam     Initial Vitals [12/27/23 1633]   BP Pulse Resp Temp SpO2   (!) 126/91 106 16 97.5 °F (36.4 °C) 100 %      MAP       --         Physical Exam    Nursing note and vitals reviewed.  Constitutional: He appears well-developed and well-nourished. No distress.   Chronically ill-appearing   Cardiovascular:  Normal rate. An irregularly irregular rhythm present.           Pulmonary/Chest: Breath sounds normal. No respiratory distress.   Abdominal: Abdomen is soft. There is no abdominal tenderness.   Musculoskeletal:         General: No tenderness. Normal  range of motion.     Neurological: He is alert and oriented to person, place, and time. He has normal strength. No cranial nerve deficit or sensory deficit. GCS score is 15. GCS eye subscore is 4. GCS verbal subscore is 5. GCS motor subscore is 6.   Skin: Skin is warm. Capillary refill takes less than 2 seconds.         ED Course   Procedures  Labs Reviewed   B-TYPE NATRIURETIC PEPTIDE - Abnormal; Notable for the following components:       Result Value    Natriuretic Peptide 319.0 (*)     All other components within normal limits   COMPREHENSIVE METABOLIC PANEL - Abnormal; Notable for the following components:    Chloride 111 (*)     Carbon Dioxide 20 (*)     Creatinine 1.36 (*)     Calcium Level Total 8.0 (*)     Protein Total 5.1 (*)     Albumin Level 2.2 (*)     Albumin/Globulin Ratio 0.8 (*)     All other components within normal limits   LACTIC ACID, PLASMA - Abnormal; Notable for the following components:    Lactic Acid Level 2.4 (*)     All other components within normal limits   URINALYSIS, REFLEX TO URINE CULTURE - Abnormal; Notable for the following components:    Appearance, UA Cloudy (*)     Protein, UA 1+ (*)     Blood, UA 2+ (*)     Nitrites, UA Positive (*)     Leukocyte Esterase, UA Trace (*)     All other components within normal limits   PROTIME-INR - Abnormal; Notable for the following components:    PT 12.8 (*)     All other components within normal limits   APTT - Abnormal; Notable for the following components:    PTT 33.4 (*)     All other components within normal limits   CBC WITH DIFFERENTIAL - Abnormal; Notable for the following components:    RBC 4.67 (*)     Hgb 13.2 (*)     Hct 40.7 (*)     MCHC 32.4 (*)     Platelet 442 (*)     Neut # 9.58 (*)     IG# 0.21 (*)     All other components within normal limits   URINALYSIS, MICROSCOPIC - Abnormal; Notable for the following components:    Bacteria, UA Many (*)     Mucous, UA Moderate (*)     RBC, UA 21-50 (*)     WBC, UA 21-50 (*)      Squamous Epithelial Cells, UA Few (*)     All other components within normal limits   COVID/FLU A&B PCR - Normal    Narrative:     The Xpert Xpress SARS-CoV-2/FLU/RSV plus is a rapid, multiplexed real-time PCR test intended for the simultaneous qualitative detection and differentiation of SARS-CoV-2, Influenza A, Influenza B, and respiratory syncytial virus (RSV) viral RNA in either nasopharyngeal swab or nasal swab specimens.         TROPONIN I - Normal   BLOOD CULTURE [facility]   BLOOD CULTURE [facility]   CULTURE, URINE   CBC W/ AUTO DIFFERENTIAL    Narrative:     The following orders were created for panel order CBC auto differential.  Procedure                               Abnormality         Status                     ---------                               -----------         ------                     CBC with Differential[[id number]]       Abnormal            Final result                 Please view results for these tests on the individual orders.   LACTIC ACID, PLASMA     EKG Readings: (Independently Interpreted)   Initial Reading: No STEMI. Rhythm: Atrial Fibrillation. Heart Rate: 94. Ectopy: No Ectopy. Conduction: Normal. ST Segments: Non-Specific ST Segment Depression. T Waves: Normal. Clinical Impression: Atrial Fibrillation     ECG Results              EKG 12-lead (In process)  Result time 12/27/23 18:26:38      In process by Interface, Lab In [facility] (12/27/23 18:26:38)                   Narrative:    Test Reason : R53.1,    Vent. Rate : 094 BPM     Atrial Rate : 267 BPM     P-R Int : 000 ms          QRS Dur : 068 ms      QT Int : 300 ms       P-R-T Axes : 000 058 069 degrees     QTc Int : 375 ms    Undetermined rhythm  Nonspecific ST and T wave abnormality  Abnormal ECG  When compared with ECG of 14-DEC-2023 16:23,  Current undetermined rhythm precludes rhythm comparison, needs review  ST now depressed in Anterior leads  QT has shortened    Referred By: [email or web address]   SELF           Confirmed By:                                    Imaging Results              X-Ray Chest 1 View (Final result)  Result time 12/27/23 17:41:45      Final result by Dawson Bolivar MD (12/27/23 17:41:45)                   Impression:      No acute findings.      Electronically signed by: Dawson Bolivar  Date:    12/27/2023  Time:    17:41               Narrative:    EXAMINATION:  XR CHEST 1 VIEW    CLINICAL HISTORY:  Weakness    COMPARISON:  15 December 2023    FINDINGS:  Frontal view of the chest was obtained. The heart is not enlarged.  There is aortic atherosclerosis.  The lungs are grossly clear.  There is no pneumothorax.                                       CT Head Without Contrast (Final result)  Result time 12/27/23 17:39:47      Final result by Mana Del Rio MD (12/27/23 17:39:47)                   Impression:      1. No acute intracranial abnormality.  2. Chronic microvascular ischemic changes.      Electronically signed by: Mana Del Rio  Date:    12/27/2023  Time:    17:39               Narrative:    EXAMINATION:  CT HEAD WITHOUT CONTRAST    CLINICAL HISTORY:  Mental status change, unknown cause;    TECHNIQUE:  Axial scans were obtained from skull base to the vertex.    Coronal and sagittal reconstructions obtained from the axial data.    Automatic exposure control was utilized to limit radiation dose.    Contrast: None    Radiation Dose:    Total DLP: 767 mGy*cm    COMPARISON:  CT head dated 12/14/2023    FINDINGS:  There is no acute intracranial hemorrhage or edema. The gray-white matter differentiation is preserved.  Patchy hypodensities in the subcortical and periventricular white matter likely represent chronic microvascular ischemic changes.    There is no mass effect or midline shift.  There is diffuse parenchymal volume loss.  The basal cisterns are patent. There is no abnormal extra-axial fluid collection.  Carotid artery calcifications are noted.    The calvarium and skull base are intact. The visualized paranasal  sinuses and the mastoid air cells are clear.                                       Medications   cefTRIAXone (ROCEPHIN) 1 g in dextrose 5 % in water (D5W) 100 mL IVPB (MB+) (1 g Intravenous New Bag 12/27/23 1746)   sodium chloride 0.9% flush 10 mL (has no administration in time range)   melatonin tablet 6 mg (has no administration in time range)     Medical Decision Making  differential diagnosis  Physical deconditioning, UTI, metabolic derangement, CVA, dehydration, anger issues,  as well as multiple other possible etiologies      Problems Addressed:  Physical deconditioning: chronic illness or injury with exacerbation, progression, or side effects of treatment  Urinary tract infection associated with indwelling urethral catheter, initial encounter: acute illness or injury    Amount and/or Complexity of Data Reviewed  Labs: ordered. Decision-making details documented in ED Course.  Radiology: ordered. Decision-making details documented in ED Course.    Risk  OTC drugs.  Prescription drug management.  Decision regarding hospitalization.               ED Course as of 12/27/23 1838   Wed Dec 27, 2023   1700 Spoke with patient's primary care provider, Dr. ECHEVARRIA.  He will agree to admit for either urinary tract infection or failure to thrive.  Will work on nursing home placement.  We will call him once the labs and imaging results [BS]   1708 WBC: 11.49 [BS]   1708 Hemoglobin(!): 13.2 [BS]   1708 Hematocrit(!): 40.7 [BS]   1708 Platelet Count(!): 442 [BS]   1723 Leukocytes, UA(!): Trace [BS]   1723 NITRITE UA(!): Positive [BS]   1723 WBC, UA(!): 21-50 [BS]   1745 X-Ray Chest 1 View  No consolidations [BS]   1837 Hospitalist agrees with admission [BS]      ED Course User Index  [BS] Brayan Polanco MD                           Clinical Impression:  Final diagnoses:  [R53.1] Weakness  [T83.511A, N39.0] Urinary tract infection associated with indwelling urethral catheter, initial encounter (Primary)  [R53.81]  Physical deconditioning          ED Disposition Condition    Admit                 Brayan Polanco MD  12/27/23 3304

## 2023-12-28 PROBLEM — T83.511A URINARY TRACT INFECTION ASSOCIATED WITH INDWELLING URETHRAL CATHETER: Status: ACTIVE | Noted: 2023-01-01

## 2023-12-28 PROBLEM — G31.84 MINOR NEUROCOGNITIVE DISORDER: Status: ACTIVE | Noted: 2023-01-01

## 2023-12-28 PROBLEM — N39.0 URINARY TRACT INFECTION ASSOCIATED WITH INDWELLING URETHRAL CATHETER: Status: ACTIVE | Noted: 2023-01-01

## 2023-12-28 NOTE — HPI
Date of evaluation:  December 28, 2023.    83-year-old white male with a history of recent discharge from inpatient setting after episodes of increased agitation and confusion at home.  Patient was sent in by his primary care physician after presented to the office with changes in behavior.    Patient this time comes in now inpatient setting as she was identifies having urinary tract infection.    Consulted seen patient at this time in terms of the evaluation for overall mental health issues and need for possible management for agitation.    Patient this time reports most recently things have been very stressful at home.  He reports he has an adult daughter who has recently moved into the home.  Apparently there are some conflicts between he and the daughter.  There appears to be issues regarding money that have raised patient's degree of agitation with daughter.  Patient was described by staff here at the hospital to have difficulties in terms of irritability when daughter and wife were visiting today.    Patient has verbalizes desire to go to the nursing home.    Patient has a history of multiple comorbid states including atrial fibrillation, history of colon cancer, history of Crohn's disease, severe protein calorie malnutrition, and urinary tract infection.    Patient at this time on interview she was evidence of which there appears to be minor slippage in his overall cognitive skills.  He reports that this time that there have been ongoing conflict between he and his daughter at home.  He reports his daughter has asked for money from them but however he can not spell it out exactly what she was asked for.  Whether this is reality based or not as difficult to discern.  Patient this time clearly shows evidence of slippage in his memory.  With me he was calm mildly guarded but not overtly paranoid.  He was not show any evidence of gross agitation.    Patient this time when interviewed was showing evidence of mild  difficulties in terms of short-term memory functioning.  He was able to recall 3 objects immediately.  At 5 minutes he could recall 1 of 3 without prompting.  He could recall 2 or 3 with prompting.  Patient had some cognitive slippage in terms of knowledge base.  Particularly difficulties in terms of concentration focused as she is struggled with simple calculations.  His ability to perform serial sevens was not applicable as serial threes were attempted he was struggled with it.    Patient did not confirm any distortions and perceptions.    Discussed with him at this time whether he wants to consider going forward to the nursing home.  He states he has not decided yet.  Apparently he would benefit from some physical rehabilitation as he does have significant degrees of deconditioning.

## 2023-12-28 NOTE — ASSESSMENT & PLAN NOTE
Will start on Zosyn while awaiting cultures. Will not start a fluoroquinolone due to patient's history of Atrial fibrillation.

## 2023-12-28 NOTE — SUBJECTIVE & OBJECTIVE
Past Medical History:   Diagnosis Date    Anasarca     Atrial fibrillation     Bradycardia     CAD (coronary artery disease)     Chemotherapy-induced neuropathy     Crohn's disease of both small and large intestine     Diverticulosis     DJD (degenerative joint disease)     Former tobacco use     Gout     H/O cardiac catheterization     History of colon cancer, stage III 05/20/2011    HLD (hyperlipidemia)     HTN (hypertension)     Hypocalcemia     Hypokalemia     Hypotension     Neuropathy     Osteoarthritis     Personal history of colonic polyps 10/24/2017    s/p polypectomy - Dr Kenny Vargas    Physical deconditioning     Severe protein-calorie malnutrition     Urinary retention     Weakness        Past Surgical History:   Procedure Laterality Date    APPENDECTOMY      CARDIAC CATHETERIZATION  10/14/2009    Dr Tim Bryan    COLONOSCOPY N/A 06/27/2023    Procedure: COLONOSCOPY;  Surgeon: Kenny Vargas MD;  Location: [facility];  Service: Gastroenterology;  Laterality: N/A;    COLONOSCOPY W/ BIOPSIES AND POLYPECTOMY  05/30/2012    3mm polyp - Dr Kenny Vargas    COLONOSCOPY W/ BIOPSIES AND POLYPECTOMY  10/24/2017    2 polyps - Dr Kenny Vargas    COLONOSCOPY W/ BIOPSIES AND POLYPECTOMY  07/22/2014    2 polyps - Dr Kenny Vargas    HEMICOLECTOMY, RIGHT, LAPAROSCOPIC  05/20/2011    Dr Elijah Huntley    LUMBAR DISCECTOMY      MEDIPORT INSERTION, SINGLE  06/16/2011    Dr Elijah Huntley       Review of patient's allergies indicates:  No Known Allergies    No current facility-administered medications on file prior to encounter.     Current Outpatient Medications on File Prior to Encounter   Medication Sig    apixaban (ELIQUIS) 5 mg Tab Take 1 tablet (5 mg total) by mouth 2 (two) times a day.    gabapentin (NEURONTIN) 300 MG capsule Take 1 capsule (300 mg total) by mouth 3 (three) times daily. (Patient taking differently: Take 300 mg by mouth daily as needed.)    megestroL (MEGACE) 400 mg/10 mL (10  mL) Susp Take 5 mLs (200 mg total) by mouth once daily.    metoprolol tartrate (LOPRESSOR) 25 MG tablet Take 1 tablet (25 mg total) by mouth 2 (two) times daily.    multivitamin (THERAGRAN) per tablet Take 1 tablet by mouth once daily.     Family History       Problem Relation (Age of Onset)    No Known Problems Mother    Other Father (42)          Tobacco Use    Smoking status: Former     Types: Cigarettes    Smokeless tobacco: Never   Substance and Sexual Activity    Alcohol use: Not Currently    Drug use: Never    Sexual activity: Not Currently     Review of Systems   Constitutional:  Positive for activity change and appetite change. Negative for chills, fatigue and fever.   HENT:  Negative for congestion and sore throat.    Respiratory:  Positive for shortness of breath. Negative for apnea, cough, choking, chest tightness, wheezing and stridor.    Cardiovascular:  Positive for leg swelling. Negative for chest pain and palpitations.   Gastrointestinal:  Negative for abdominal pain, constipation and diarrhea.   Genitourinary:  Negative for dysuria and hematuria.   Musculoskeletal:  Negative for arthralgias.   Skin:  Positive for pallor.   Neurological:  Negative for dizziness and headaches.   Psychiatric/Behavioral:  Positive for agitation, behavioral problems and confusion. Negative for decreased concentration and sleep disturbance. The patient is not nervous/anxious.      Objective:     Vital Signs (Most Recent):  Temp: 97.8 °F (36.6 °C) (12/28/23 1131)  Pulse: (!) 56 (12/28/23 1131)  Resp: 18 (12/28/23 1131)  BP: 126/68 (12/28/23 1131)  SpO2: 100 % (12/28/23 1131) Vital Signs (24h Range):  Temp:  [96.1 °F (35.6 °C)-98.7 °F (37.1 °C)] 97.8 °F (36.6 °C)  Pulse:  [] 56  Resp:  [16-22] 18  SpO2:  [97 %-100 %] 100 %  BP: ()/(57-98) 126/68     Weight: 66.1 kg (145 lb 11.6 oz)  Body mass index is 24.25 kg/m².     Physical Exam  Vitals reviewed.   Constitutional:       General: He is not in acute  distress.     Appearance: Normal appearance. He is ill-appearing.   Eyes:      Extraocular Movements: EOM normal.      Pupils: Pupils are equal, round, and reactive to light.   Cardiovascular:      Rate and Rhythm: Regular rhythm. Bradycardia present.      Pulses: Normal pulses.      Heart sounds: Normal heart sounds. No murmur heard.     No friction rub. No gallop.   Pulmonary:      Effort: No respiratory distress.      Breath sounds: No wheezing, rhonchi or rales.   Musculoskeletal:         General: No swelling.      Right lower leg: No edema.      Left lower leg: No edema.   Skin:     General: Skin is warm and dry.   Neurological:      General: No focal deficit present.      Mental Status: He is alert and oriented to person, place, and time.      Motor: Weakness present.   Psychiatric:         Mood and Affect: Mood normal.         Behavior: Behavior normal.              CRANIAL NERVES     CN I  cranial nerve I not tested    CN II   Visual fields full to confrontation.     CN III, IV, VI   Pupils are equal, round, and reactive to light.  Extraocular motions are normal.     CN V   Facial sensation intact.     CN VII   Facial expression full, symmetric.     CN VIII   CN VIII normal.     CN IX, X   CN IX normal.   CN X normal.     CN XI   CN XI normal.     CN XII   CN XII normal.        Significant Labs: All pertinent labs within the past 24 hours have been reviewed.    Significant Imaging: I have reviewed all pertinent imaging results/findings within the past 24 hours.

## 2023-12-28 NOTE — CONSULTS
[facility] - Medical Surgical Unit  Psychiatry  Consult Note    Patient Name: Supa Carmen  MRN: [id number]   Code Status: Full Code  Admission Date: 12/27/2023  Hospital Length of Stay: 1 days  Attending Physician: Randy Evans DO  Primary Care Provider: Randy Evans DO    Current Legal Status: Formal Voluntary Admission (FVA)    Patient information was obtained from patient and ER records.   Consults  Subjective:     Principal Problem:Urinary tract infection associated with indwelling urethral catheter    Chief Complaint:  Randy told me you were coming     HPI: Date of evaluation:  December 28, 2023.    83-year-old white male with a history of recent discharge from inpatient setting after episodes of increased agitation and confusion at home.  Patient was sent in by his primary care physician after presented to the office with changes in behavior.    Patient this time comes in now inpatient setting as she was identifies having urinary tract infection.    Consulted seen patient at this time in terms of the evaluation for overall mental health issues and need for possible management for agitation.    Patient this time reports most recently things have been very stressful at home.  He reports he has an adult daughter who has recently moved into the home.  Apparently there are some conflicts between he and the daughter.  There appears to be issues regarding money that have raised patient's degree of agitation with daughter.  Patient was described by staff here at the hospital to have difficulties in terms of irritability when daughter and wife were visiting today.    Patient has verbalizes desire to go to the nursing home.    Patient has a history of multiple comorbid states including atrial fibrillation, history of colon cancer, history of Crohn's disease, severe protein calorie malnutrition, and urinary tract infection.    Patient at this time on interview she was evidence of which there  appears to be minor slippage in his overall cognitive skills.  He reports that this time that there have been ongoing conflict between he and his daughter at home.  He reports his daughter has asked for money from them but however he can not spell it out exactly what she was asked for.  Whether this is reality based or not as difficult to discern.  Patient this time clearly shows evidence of slippage in his memory.  With me he was calm mildly guarded but not overtly paranoid.  He was not show any evidence of gross agitation.    Patient this time when interviewed was showing evidence of mild difficulties in terms of short-term memory functioning.  He was able to recall 3 objects immediately.  At 5 minutes he could recall 1 of 3 without prompting.  He could recall 2 or 3 with prompting.  Patient had some cognitive slippage in terms of knowledge base.  Particularly difficulties in terms of concentration focused as she is struggled with simple calculations.  His ability to perform serial sevens was not applicable as serial threes were attempted he was struggled with it.    Patient did not confirm any distortions and perceptions.    Discussed with him at this time whether he wants to consider going forward to the nursing home.  He states he has not decided yet.  Apparently he would benefit from some physical rehabilitation as he does have significant degrees of deconditioning.    Hospital Course: No notes on file         Patient History               Medical as of 12/28/2023       Past Medical History       Diagnosis Date Comments Source    Anasarca -- -- Provider    Atrial fibrillation -- -- Provider    Bradycardia -- -- Provider    CAD (coronary artery disease) -- -- Provider    Chemotherapy-induced neuropathy -- -- Provider    Crohn's disease of both small and large intestine -- -- Provider    Diverticulosis -- -- Provider    DJD (degenerative joint disease) -- -- Provider    Former tobacco use -- -- Provider    Gout  -- -- Provider    H/O cardiac catheterization -- -- Provider    History of colon cancer, stage III 05/20/2011 -- Provider    HLD (hyperlipidemia) -- -- Provider    HTN (hypertension) -- -- Provider    Hypocalcemia -- -- Provider    Hypokalemia -- -- Provider    Hypotension -- -- Provider    Neuropathy -- -- Provider    Osteoarthritis -- -- Provider    Personal history of colonic polyps 10/24/2017 s/p polypectomy - Dr Kenny Vargas Provider    Physical deconditioning -- -- Provider    Severe protein-calorie malnutrition -- -- Provider    Urinary retention -- -- Provider    Weakness -- -- Provider                          Surgical as of 12/28/2023       Past Surgical History       Procedure Laterality Date Comments Source    CARDIAC CATHETERIZATION -- 10/14/2009 Dr Tim Bryan Provider    LUMBAR DISCECTOMY -- -- -- Provider    HEMICOLECTOMY, RIGHT, LAPAROSCOPIC -- 05/20/2011 Dr Elijah Huntley Provider    COLONOSCOPY W/ BIOPSIES AND POLYPECTOMY -- 05/30/2012 3mm polyp - Dr Kenny Vargas Provider    MEDIPORT INSERTION, SINGLE -- 06/16/2011 Dr Elijah Huntley Provider    COLONOSCOPY W/ BIOPSIES AND POLYPECTOMY -- 10/24/2017 2 polyps - Dr Kenny Vargas Provider    COLONOSCOPY W/ BIOPSIES AND POLYPECTOMY -- 07/22/2014 2 polyps - Dr Kenny Vargas Provider    COLONOSCOPY N/A 06/27/2023 Procedure: COLONOSCOPY;  Surgeon: Kenny Vargas MD;  Location: [facility];  Service: Gastroenterology;  Laterality: N/A; Provider    APPENDECTOMY -- -- -- Provider                          Family as of 12/28/2023       Problem Relation Name Age of Onset Comments Source    No Known Problems Mother -- -- -- Provider    Other Father -- 42 intestinal rupture - Cause of death Provider                  Tobacco Use as of 12/28/2023       Smoking Status Smoking Start Date Quit Date Current Packs/Day Average Packs/Day    Former -- -- --       Smokeless Status Smokeless Type Smokeless Quit Date    Never -- --      Source    --                   Alcohol Use as of 12/28/2023       Alcohol Use Drinks/Week Alcohol/Week Comments Source    Not Currently   -- -- Provider                  Drug Use as of 12/28/2023       Drug Use Types Frequency Comments Source    Never -- -- -- Provider                  Sexual Activity as of 12/28/2023       Sexually Active Birth Control Partners Comments Source    Not Currently -- -- -- Provider                  Activities of Daily Living as of 12/28/2023    None               Social Documentation as of 12/28/2023    None               Occupational as of 12/28/2023    None               Socioeconomic as of 12/28/2023       Marital Status Spouse Name Number of Children Years Education Education Level Preferred Language Ethnicity Race Source     -- -- -- -- English Not  or /a White --                  Pertinent History       Question Response Comments    Lives with -- --    Place in Birth Order -- --    Lives in -- --    Number of Siblings -- --    Raised by -- --    Legal Involvement -- --    Childhood Trauma -- --    Criminal History of -- --    Financial Status -- --    Highest Level of Education -- --    Does patient have access to a firearm? -- --     Service -- --    Primary Leisure Activity -- --    Spirituality -- --          Past Medical History:   Diagnosis Date    Anasarca     Atrial fibrillation     Bradycardia     CAD (coronary artery disease)     Chemotherapy-induced neuropathy     Crohn's disease of both small and large intestine     Diverticulosis     DJD (degenerative joint disease)     Former tobacco use     Gout     H/O cardiac catheterization     History of colon cancer, stage III 05/20/2011    HLD (hyperlipidemia)     HTN (hypertension)     Hypocalcemia     Hypokalemia     Hypotension     Neuropathy     Osteoarthritis     Personal history of colonic polyps 10/24/2017    s/p polypectomy - Dr Kenny Vargas    Physical deconditioning     Severe protein-calorie  malnutrition     Urinary retention     Weakness      Past Surgical History:   Procedure Laterality Date    APPENDECTOMY      CARDIAC CATHETERIZATION  10/14/2009    Dr Tim Bryan    COLONOSCOPY N/A 06/27/2023    Procedure: COLONOSCOPY;  Surgeon: Kenny Vargas MD;  Location: [facility];  Service: Gastroenterology;  Laterality: N/A;    COLONOSCOPY W/ BIOPSIES AND POLYPECTOMY  05/30/2012    3mm polyp - Dr Kenny Vargas    COLONOSCOPY W/ BIOPSIES AND POLYPECTOMY  10/24/2017    2 polyps - Dr Kenny Vargas    COLONOSCOPY W/ BIOPSIES AND POLYPECTOMY  07/22/2014    2 polyps - Dr Kenny Vargas    HEMICOLECTOMY, RIGHT, LAPAROSCOPIC  05/20/2011    Dr Elijah Huntley    LUMBAR DISCECTOMY      MEDIPORT INSERTION, SINGLE  06/16/2011    Dr Elijah Huntley     Family History       Problem Relation (Age of Onset)    No Known Problems Mother    Other Father (42)          Tobacco Use    Smoking status: Former     Types: Cigarettes    Smokeless tobacco: Never   Substance and Sexual Activity    Alcohol use: Not Currently    Drug use: Never    Sexual activity: Not Currently     Review of patient's allergies indicates:  No Known Allergies    No current facility-administered medications on file prior to encounter.     Current Outpatient Medications on File Prior to Encounter   Medication Sig    apixaban (ELIQUIS) 5 mg Tab Take 1 tablet (5 mg total) by mouth 2 (two) times a day.    gabapentin (NEURONTIN) 300 MG capsule Take 1 capsule (300 mg total) by mouth 3 (three) times daily. (Patient taking differently: Take 300 mg by mouth daily as needed.)    megestroL (MEGACE) 400 mg/10 mL (10 mL) Susp Take 5 mLs (200 mg total) by mouth once daily.    metoprolol tartrate (LOPRESSOR) 25 MG tablet Take 1 tablet (25 mg total) by mouth 2 (two) times daily.    multivitamin (THERAGRAN) per tablet Take 1 tablet by mouth once daily.     Psychotherapeutics (From admission, onward)      None          Review of Systems  Strengths and  "Liabilities:  Strengths:  Supportive family, access to healthcare.  Liabilities:  Cognitive slippage, poor physical health    Objective:     Vital Signs (Most Recent):  Temp: 97.8 °F (36.6 °C) (12/28/23 1543)  Pulse: 87 (12/28/23 1543)  Resp: 18 (12/28/23 1543)  BP: 117/61 (12/28/23 1543)  SpO2: 99 % (12/28/23 1543) Vital Signs (24h Range):  Temp:  [97.5 °F (36.4 °C)-98.7 °F (37.1 °C)] 97.8 °F (36.6 °C)  Pulse:  [] 87  Resp:  [16-22] 18  SpO2:  [97 %-100 %] 99 %  BP: ()/(57-98) 117/61     Height: 5' 5" (165.1 cm)  Weight: 66.1 kg (145 lb 11.6 oz)  Body mass index is 24.25 kg/m².      Intake/Output Summary (Last 24 hours) at 12/28/2023 1629  Last data filed at 12/28/2023 1123  Gross per 24 hour   Intake 779.18 ml   Output 100 ml   Net 679.18 ml          Physical Exam    Mental status examination:  83-year-old white male currently in bed whose speech is slowly produced but when produced was well articulated without evidence of dysarthria.  His thought content demonstrated no clear evidence of any auditory or visual hallucinations.  There appeared to be some mild suspiciousness directed towards daughter but no overt well constructed paranoid delusions.  He denied intention to harm self.  He denied intention to harm others.  His insight and judgment deemed to be limited.    On cognitive assessment he was oriented to situation setting.  His immediate memory is 3/3 objects immediately.  1/3 objects 5 minutes.  2/3 objects 5 minutes with prompting.  Long-term memory appeared to be intact.  Patient was somewhat concrete.  His overall fund of knowledge certainly showed evidence of slippage in terms of basic knowledge.  He showed impairment in terms of concentration and could not perform simple calculations nor serial threes without making areas.        Significant Labs: Last 72 Hours:   Recent Lab Results         12/28/23  0552   12/27/23  1900   12/27/23  1655   12/27/23  1649        Influenza A, Molecular     " Not Detected         Influenza B, Molecular     Not Detected         Albumin/Globulin Ratio 0.7       0.8       Neutrophils Abs Calc 2.9547             Albumin 1.6       2.2       ALP 41       49       ALT 8       12       Appearance, UA     Cloudy         aPTT       33.4  Comment: For Minimal Heparin Infusion, the goal aPTT 64-85 seconds corresponds to an anti-Xa of 0.3-0.5.    For Low Intensity and High Intensity Heparin, the goal aPTT  seconds corresponds to an anti-Xa of 0.3-0.7       AST 12       16       Bacteria, UA     Many         Bands 1             Baso #       0.05       Basophil %       0.4       BILIRUBIN TOTAL 0.4       0.5       Bilirubin, UA     Negative         BNP       319.0       BUN 14.0       16.0       Calcium 7.3       8.0       Chloride 112       111       CO2 19       20       Color, UA     Yellow         Creatinine 1.09       1.36       eGFR >60       51       Eos #       0.01       Eosinophil %       0.1       Globulin, Total 2.3       2.9       Glucose 67       94       Glucose, UA     Negative         Hematocrit 34.6       40.7       Hemoglobin 11.1       13.2       Immature Grans (Abs)       0.21       Immature Granulocytes       1.8       INR       1.2       Ketones, UA     Negative         Lactate, Fabian   2.8     2.4       Leukocytes, UA     Trace         Lymph # 1.5008       0.96       LYMPH %       8.4       Lymphs % 32  Comment: Few Reactive lymphs seen             MCH 28.5       28.3       MCHC 32.1       32.4       MCV 88.9       87.2       Mono # 0.2345       0.68       Mono % 5       5.9       MPV 9.8       9.0       Mucous, UA     Moderate         Neut #       9.58       Neut %       83.4       Neutrophils Relative 62             NITRITE UA     Positive         nRBC 0.0       0.0       Occult Blood UA     2+         pH, UA     5.5         Platelet Estimate Adequate             Platelet Count 316       442       Potassium 3.2       3.7       PROTEIN TOTAL 3.9       5.1        Protein, UA     1+         Protime       12.8       RBC 3.89       4.67       RBC, UA     21-50         RDW 16.8       16.8       SARS-CoV2 (COVID-19) Qualitative PCR     Not Detected         Sodium 140       140       Specific Gravity,UA     1.025         Squam Epithel, UA     Few         Troponin I       0.012       Urobilinogen, UA     0.2         WBC, UA     21-50         WBC 4.69       11.49               Significant Imaging: CT: I have reviewed all pertinent results/findings within the past 24 hours.  Patient shows evidence of micro vascular changes.  Assessment/Plan:     Minor neurocognitive disorder  Patient was clear evidence of memory slippage.  This may be exacerbated by current infectious process.  At this time would be hesitant to get too aggressive in terms of management of current agitation.  Simply would recommend utilization of trials of Seroquel 25 mg every 6 hours p.r.n. agitation.  To begin addressing issues of minor neurocognitive slippage recommendations for trials Namenda at 5 mg at hours sleep we will be recommended.  That may be increased to 5 mg twice daily after 7 days.    Patient shows evidence of increased agitation, sleeplessness, or behavioral disturbances please reconsult service.         Total Time:  45 minutes     Leon Brush MD   Psychiatry  [facility] - Medical Surgical Unit

## 2023-12-28 NOTE — NURSING
[facility] called in. PASSAR completed and faxed. Nursing home of choice is 1. [facility] and 2. [facility]. Referrals sent to both via [facility] in preparation for discharge.

## 2023-12-28 NOTE — PT/OT/SLP EVAL
Physical Therapy Acute Care Evaluation    Patient Name:  Supa Carmen   MRN:  [id number]    History:     Per MD:     Altered Mental Status    Aggressive Behavior    Fatigue       Confusion, combative and fatigue since being discharged from hospital per family   Sent by Dr. Evans for medical eval vs dory-psych placement.        HPI     83-year-old male with a past medical history of AFib on Eliquis, Crohn's disease, physical deconditioning as well as other diagnosis as delineated below presents emergency department for possible UTI.  He was recently discharged from the hospital on Friday, 5 days ago.  Went to PCP office for an appointment and was sent here because supposedly did not feel like he was safe to be home.  He has been getting aggravated with family members since going home.  He is very unsteady on his feet.  Did not fall.  They are concerned he may have a UTI.  Patient denies any complaints.    Past Medical History:   Diagnosis Date    Anasarca     Atrial fibrillation     Bradycardia     CAD (coronary artery disease)     Chemotherapy-induced neuropathy     Crohn's disease of both small and large intestine     Diverticulosis     DJD (degenerative joint disease)     Former tobacco use     Gout     H/O cardiac catheterization     History of colon cancer, stage III 05/20/2011    HLD (hyperlipidemia)     HTN (hypertension)     Hypocalcemia     Hypokalemia     Hypotension     Neuropathy     Osteoarthritis     Personal history of colonic polyps 10/24/2017    s/p polypectomy - Dr Kenny Vargas    Physical deconditioning     Severe protein-calorie malnutrition     Urinary retention     Weakness        Past Surgical History:   Procedure Laterality Date    APPENDECTOMY      CARDIAC CATHETERIZATION  10/14/2009    Dr Tim Bryan    COLONOSCOPY N/A 06/27/2023    Procedure: COLONOSCOPY;  Surgeon: Kenny Vargas MD;  Location: [facility];  Service: Gastroenterology;  Laterality: N/A;    COLONOSCOPY W/  BIOPSIES AND POLYPECTOMY  05/30/2012    3mm polyp - Dr Kenny Vargas    COLONOSCOPY W/ BIOPSIES AND POLYPECTOMY  10/24/2017    2 polyps - Dr Kenny Vargas    COLONOSCOPY W/ BIOPSIES AND POLYPECTOMY  07/22/2014    2 polyps - Dr Kenny Vargas    HEMICOLECTOMY, RIGHT, LAPAROSCOPIC  05/20/2011    Dr Elijah Huntley    LUMBAR DISCECTOMY      MEDIPORT INSERTION, SINGLE  06/16/2011    Dr Elijah Huntley       Recent Surgery: * No surgery found *      Subjective     Chief Complaint: None stated  Patient/Family Comments/goals: NH placement  Pain/Comfort:  Pain Rating 1: 0/10      Living Environment:  Pt was recently D/C from this facility with 24 hour family care and HH services.  Pt has been readmitted due to family's reported inability to care for pt at home.  Family is now requesting NH placement.  Equipment used at home: walker, rolling.        Objective:     Patient found supine with bed alarm, telemetry, kahn catheter  upon PT entry to room.    General Precautions: Standard, fall   Orthopedic Precautions:N/A   Braces: N/A  Respiratory Status: Room air        Functional Mobility:     Assist Level Assistive Device Comments    Bed Mobility SBA  Semi-supine to sitting at EOB using bed railing for assistance. Increased time was required to reach upright sitting, but pt was able to do so without PT's assistance.    Sit to stand/Stand to sit SBA RW vs no AD Sit to stand/stand to sit performed both with and without the RW.  Reminders provided for hand placement when utilizing the AD.    Transfers SBA-CGA  Pt mobilized into the restroom with CGA provided for safety.  Due to spatial constraints, pt performed this transition without an AD.  Grab bar utilized for support while transitioning onto and off of the toilet.  Pt performed a +BM and PT assisted with hygiene for cleanliness.    Gait SBA-CGA RW Pt able to ambulate 500 ft with a step through gait pattern and slow but steady gait speed.  Slightly flexed posture  observed throughout. Pt was able to negotiate around obstacles and through narrow spaces without overt LOB.     Balance Training      Wheelchair Mobility      Stair Climbing      Car Transfer      Other:             Assessment:     Supa Carmen is a 83 y.o. male admitted with a medical diagnosis of <principal problem not specified>.  He presents with the following impairments/functional limitations:  weakness, impaired endurance, impaired cognition, impaired self care skills, impaired functional mobility, gait instability, impaired balance, decreased safety awareness.    Rehab Prognosis: Fair; patient would benefit from acute skilled PT services to address these deficits and reach maximum level of function.      Additional information: Pt tolerated evaluation fairly well and appears to be functioning at a similar level as compared to his recent D/C from this facility.  Family has reported that they are no longer able to care for pt at home and are requesting NH placement.      Patient left up in chair with all lines intact, call button in reach, and chair alarm on.      Plan:     During this hospitalization, patient to be seen 6 x/week to address the identified rehab impairments via gait training, therapeutic activities, therapeutic exercises and progress toward the following goals:    GOALS:   Multidisciplinary Problems       Physical Therapy Goals          Problem: Physical Therapy    Goal Priority Disciplines Outcome Goal Variances Interventions   Physical Therapy Goal     PT, PT/OT      Description: Patient will increase functional independence with mobility by performin. Supine to sit with Modified [place]  2. Sit to supine with Modified [place]  3. Sit to stand transfer with Modified [place]  4. Gait  x 400 feet with Modified [place] using Rolling Walker.                          Recommendations:     Discharge Recommendations:  NH placement  Discharge Equipment Recommendations:  none     Time Tracking:       PT Start Time: 0847     PT Stop Time: 0914  PT Total Time (min): 27 min     Billable Minutes: Evaluation 27 12/28/2023

## 2023-12-28 NOTE — PLAN OF CARE
Problem: Adult Inpatient Plan of Care  Goal: Plan of Care Review  Outcome: Ongoing, Progressing  Goal: Patient-Specific Goal (Individualized)  Outcome: Ongoing, Progressing  Goal: Absence of Hospital-Acquired Illness or Injury  Outcome: Ongoing, Progressing  Goal: Optimal Comfort and Wellbeing  Outcome: Ongoing, Progressing  Goal: Readiness for Transition of Care  Outcome: Ongoing, Progressing     Problem: Fatigue  Goal: Improved Activity Tolerance  Outcome: Ongoing, Progressing     Problem: Fall Injury Risk  Goal: Absence of Fall and Fall-Related Injury  Outcome: Ongoing, Progressing     Problem: Infection  Goal: Absence of Infection Signs and Symptoms  Outcome: Ongoing, Progressing     Problem: Hypertension Comorbidity  Goal: Blood Pressure in Desired Range  Outcome: Ongoing, Progressing     Problem: Pain Acute  Goal: Acceptable Pain Control and Functional Ability  Outcome: Ongoing, Progressing     Problem: Dysrhythmia  Goal: Normalized Cardiac Rhythm  Outcome: Ongoing, Progressing

## 2023-12-28 NOTE — ASSESSMENT & PLAN NOTE
Likely exacerbated by chronic illness and family anxiety. Nursing home placement planned at discharge. At time of admission, the wife and daughter do not feel safe with the patient being at home due to his increasing agitation with them. I also have some concerns that neglect may develop if he were to go home due to the family being afraid to interact with the patient.     Nursing home is requiring psychiatric consultation prior to acceptance.

## 2023-12-28 NOTE — NURSING
Nurses Note -- 4 Eyes      12/27/2023   9:56 PM      Skin assessed during: Admit      [x] No Altered Skin Integrity Present    []Prevention Measures Documented      [] Yes- Altered Skin Integrity Present or Discovered   [] LDA Added if Not in Epic (Describe Wound)   [] New Altered Skin Integrity was Present on Admit and Documented in LDA   [] Wound Image Taken    Wound Care Consulted? No    Attending Nurse:  JASMEET Davis    Second RN/Staff Member:  ROWAN Nolasco

## 2023-12-28 NOTE — ASSESSMENT & PLAN NOTE
Patient was clear evidence of memory slippage.  This may be exacerbated by current infectious process.  At this time would be hesitant to get too aggressive in terms of management of current agitation.  Simply would recommend utilization of trials of Seroquel 25 mg every 6 hours p.r.n. agitation.  To begin addressing issues of minor neurocognitive slippage recommendations for trials Namenda at 5 mg at hours sleep we will be recommended.  That may be increased to 5 mg twice daily after 7 days.    Patient shows evidence of increased agitation, sleeplessness, or behavioral disturbances please reconsult service.

## 2023-12-28 NOTE — HPI
"83-year-old male with a past medical history of AFib on Eliquis, Crohn's disease, physical deconditioning as well as a new diagnosis of urinary retention with a urinary catheter in place since discharge. He was recently discharged from the hospital on Friday, 5 days ago for malnutrition. At his transitional care visit on 12/27/23, the patient stated that he "was here at the doctor because that's where he's supposed to be", but upon further evaluation ,the patient was alert and oriented to person, time, and place.The patient's wife and daughter did not feel safe at home with the patient due to his increasingly violent/agitated behavior. He has been getting aggravated with family members since going home. He was able to recall with great clarity the details of the altercations/incidents that lead to his family feeling unsafe. There is a firearm in the home.  He is very unsteady on his feet but no recent falls. They are concerned he may have a UTI.  Patient denies any complaints. I advised the patient to go to the ED for further medical evaluation for possible catheter associated UTI vs potential Dai-psych placement for potential altered mental status with some baseline dementia and behavioral changes. I did not feel comfortable starting medications for the patient's agitation in the outpatient setting given his declining health.      "

## 2023-12-28 NOTE — H&P
"  [facility] - Medical Surgical Unit  [facility]  History & Physical    Patient Name: Supa Carmen  MRN: [id number]  Patient Class: IP- Inpatient  Admission Date: 12/27/2023  Attending Physician: Randy Evans DO   Primary Care Provider: Randy Evans DO         Patient information was obtained from patient, spouse/SO, relative(s), and ER records.     Subjective:     Principal Problem:Urinary tract infection associated with indwelling urethral catheter    Chief Complaint:   Chief Complaint   Patient presents with    Altered Mental Status    Aggressive Behavior    Fatigue     Confusion, combative and fatigue since being discharged from hospital per family   Sent by Dr. Evans for medical eval vs dory-psych placement.          HPI: 83-year-old male with a past medical history of AFib on Eliquis, Crohn's disease, physical deconditioning as well as a new diagnosis of urinary retention with a urinary catheter in place since discharge. He was recently discharged from the hospital on Friday, 5 days ago for malnutrition. At his transitional care visit on 12/27/23, the patient stated that he "was here at the doctor because that's where he's supposed to be", but upon further evaluation ,the patient was alert and oriented to person, time, and place.The patient's wife and daughter did not feel safe at home with the patient due to his increasingly violent/agitated behavior. He has been getting aggravated with family members since going home. He was able to recall with great clarity the details of the altercations/incidents that lead to his family feeling unsafe. There is a firearm in the home.  He is very unsteady on his feet but no recent falls. They are concerned he may have a UTI.  Patient denies any complaints. I advised the patient to go to the ED for further medical evaluation for possible catheter associated UTI vs potential [facility]-psych placement for potential altered mental status with " some baseline dementia and behavioral changes. I did not feel comfortable starting medications for the patient's agitation in the outpatient setting given his declining health.        Past Medical History:   Diagnosis Date    Anasarca     Atrial fibrillation     Bradycardia     CAD (coronary artery disease)     Chemotherapy-induced neuropathy     Crohn's disease of both small and large intestine     Diverticulosis     DJD (degenerative joint disease)     Former tobacco use     Gout     H/O cardiac catheterization     History of colon cancer, stage III 05/20/2011    HLD (hyperlipidemia)     HTN (hypertension)     Hypocalcemia     Hypokalemia     Hypotension     Neuropathy     Osteoarthritis     Personal history of colonic polyps 10/24/2017    s/p polypectomy - Dr Kenny Vargas    Physical deconditioning     Severe protein-calorie malnutrition     Urinary retention     Weakness        Past Surgical History:   Procedure Laterality Date    APPENDECTOMY      CARDIAC CATHETERIZATION  10/14/2009    Dr Tim Bryan    COLONOSCOPY N/A 06/27/2023    Procedure: COLONOSCOPY;  Surgeon: Kenny Vargas MD;  Location: [facility];  Service: Gastroenterology;  Laterality: N/A;    COLONOSCOPY W/ BIOPSIES AND POLYPECTOMY  05/30/2012    3mm polyp - Dr Kenny Vargas    COLONOSCOPY W/ BIOPSIES AND POLYPECTOMY  10/24/2017    2 polyps - Dr Kenny Vargas    COLONOSCOPY W/ BIOPSIES AND POLYPECTOMY  07/22/2014    2 polyps - Dr Kenny Vargas    HEMICOLECTOMY, RIGHT, LAPAROSCOPIC  05/20/2011    Dr Elijah Huntley    LUMBAR DISCECTOMY      MEDIPORT INSERTION, SINGLE  06/16/2011    Dr Elijah Huntley       Review of patient's allergies indicates:  No Known Allergies    No current facility-administered medications on file prior to encounter.     Current Outpatient Medications on File Prior to Encounter   Medication Sig    apixaban (ELIQUIS) 5 mg Tab Take 1 tablet (5 mg total) by mouth 2 (two) times a day.    gabapentin (NEURONTIN)  300 MG capsule Take 1 capsule (300 mg total) by mouth 3 (three) times daily. (Patient taking differently: Take 300 mg by mouth daily as needed.)    megestroL (MEGACE) 400 mg/10 mL (10 mL) Susp Take 5 mLs (200 mg total) by mouth once daily.    metoprolol tartrate (LOPRESSOR) 25 MG tablet Take 1 tablet (25 mg total) by mouth 2 (two) times daily.    multivitamin (THERAGRAN) per tablet Take 1 tablet by mouth once daily.     Family History       Problem Relation (Age of Onset)    No Known Problems Mother    Other Father (42)          Tobacco Use    Smoking status: Former     Types: Cigarettes    Smokeless tobacco: Never   Substance and Sexual Activity    Alcohol use: Not Currently    Drug use: Never    Sexual activity: Not Currently     Review of Systems   Constitutional:  Positive for activity change and appetite change. Negative for chills, fatigue and fever.   HENT:  Negative for congestion and sore throat.    Respiratory:  Positive for shortness of breath. Negative for apnea, cough, choking, chest tightness, wheezing and stridor.    Cardiovascular:  Positive for leg swelling. Negative for chest pain and palpitations.   Gastrointestinal:  Negative for abdominal pain, constipation and diarrhea.   Genitourinary:  Negative for dysuria and hematuria.   Musculoskeletal:  Negative for arthralgias.   Skin:  Positive for pallor.   Neurological:  Negative for dizziness and headaches.   Psychiatric/Behavioral:  Positive for agitation, behavioral problems and confusion. Negative for decreased concentration and sleep disturbance. The patient is not nervous/anxious.      Objective:     Vital Signs (Most Recent):  Temp: 97.8 °F (36.6 °C) (12/28/23 1131)  Pulse: (!) 56 (12/28/23 1131)  Resp: 18 (12/28/23 1131)  BP: 126/68 (12/28/23 1131)  SpO2: 100 % (12/28/23 1131) Vital Signs (24h Range):  Temp:  [96.1 °F (35.6 °C)-98.7 °F (37.1 °C)] 97.8 °F (36.6 °C)  Pulse:  [] 56  Resp:  [16-22] 18  SpO2:  [97 %-100 %] 100 %  BP:  ()/(57-98) 126/68     Weight: 66.1 kg (145 lb 11.6 oz)  Body mass index is 24.25 kg/m².     Physical Exam  Vitals reviewed.   Constitutional:       General: He is not in acute distress.     Appearance: Normal appearance. He is ill-appearing.   Eyes:      Extraocular Movements: EOM normal.      Pupils: Pupils are equal, round, and reactive to light.   Cardiovascular:      Rate and Rhythm: Regular rhythm. Bradycardia present.      Pulses: Normal pulses.      Heart sounds: Normal heart sounds. No murmur heard.     No friction rub. No gallop.   Pulmonary:      Effort: No respiratory distress.      Breath sounds: No wheezing, rhonchi or rales.   Musculoskeletal:         General: No swelling.      Right lower leg: No edema.      Left lower leg: No edema.   Skin:     General: Skin is warm and dry.   Neurological:      General: No focal deficit present.      Mental Status: He is alert and oriented to person, place, and time.      Motor: Weakness present.   Psychiatric:         Mood and Affect: Mood normal.         Behavior: Behavior normal.              CRANIAL NERVES     CN I  cranial nerve I not tested    CN II   Visual fields full to confrontation.     CN III, IV, VI   Pupils are equal, round, and reactive to light.  Extraocular motions are normal.     CN V   Facial sensation intact.     CN VII   Facial expression full, symmetric.     CN VIII   CN VIII normal.     CN IX, X   CN IX normal.   CN X normal.     CN XI   CN XI normal.     CN XII   CN XII normal.        Significant Labs: All pertinent labs within the past 24 hours have been reviewed.    Significant Imaging: I have reviewed all pertinent imaging results/findings within the past 24 hours.  Assessment/Plan:     * Urinary tract infection associated with indwelling urethral catheter  Will start on Zosyn while awaiting cultures. Will not start a fluoroquinolone due to patient's history of Atrial fibrillation.       Hypotension  Will place hold parameters on  home medications.      Minor neurocognitive disorder  Likely exacerbated by chronic illness and family anxiety. Nursing home placement planned at discharge. At time of admission, the wife and daughter do not feel safe with the patient being at home due to his increasing agitation with them. I also have some concerns that neglect may develop if he were to go home due to the family being afraid to interact with the patient.     Nursing home is requiring psychiatric consultation prior to acceptance.       Severe protein-calorie malnutrition  Responding to Megace that was starting at last visit.     Anasarca  Albumin improving with increased nutrition intake.       Bradycardia  Hold parameters on medications      Physical deconditioning  PT ordered      Urinary retention  Sandoval catheter in place. Urology follow up.       Weakness  PT consulted        VTE Risk Mitigation (From admission, onward)           Ordered     apixaban tablet 5 mg  2 times daily         12/27/23 2133     IP VTE HIGH RISK PATIENT  Once         12/27/23 1838     Place sequential compression device  Until discontinued         12/27/23 1838 12/28/23 0744   Discharge Assessment   Assessment Type Discharge Planning Assessment   Confirmed/corrected address, phone number and insurance Yes   Confirmed Demographics Correct on Facesheet   Source of Information patient;family   When was your last doctors appointment? 12/27/23   People in Home spouse;child(adolfo), adult   Do you expect to return to your current living situation? No   Do you have help at home or someone to help you manage your care at home? No   Prior to hospitilization cognitive status: Unable to Assess   Current cognitive status: Unable to Assess   Walking or Climbing Stairs Difficulty yes   Walking or Climbing Stairs ambulation difficulty, requires equipment   Dressing/Bathing Difficulty yes   Dressing/Bathing bathing difficulty, requires equipment   Equipment  Currently Used at Home walker, rolling   Readmission within 30 days? No   Patient currently being followed by outpatient case management? Unable to determine (comments)   Do you currently have service(s) that help you manage your care at home? Yes   Name and Contact number of agency [facility]   Is the pt/caregiver preference to resume services with current agency Yes   Do you take prescription medications? Yes   Do you have prescription coverage? Yes   Coverage Medicare   Do you have any problems affording any of your prescribed medications? No   Is the patient taking medications as prescribed? yes   Are you on dialysis? No   Do you take coumadin? No   Discharge Plan A Skilled Nursing Facility   DME Needed Upon Discharge  none   Discharge Plan discussed with: Patient   Transition of Care Barriers None   Physical Activity   On average, how many days per week do you engage in moderate to strenuous exercise (like a brisk walk)? 0 days   On average, how many minutes do you engage in exercise at this level? 0 min   Financial Resource Strain   How hard is it for you to pay for the very basics like food, housing, medical care, and heating? Not hard   Housing Stability   In the last 12 months, was there a time when you were not able to pay the mortgage or rent on time? N   In the last 12 months, how many places have you lived? 1   In the last 12 months, was there a time when you did not have a steady place to sleep or slept in a shelter (including now)? N   Transportation Needs   In the past 12 months, has lack of transportation kept you from medical appointments or from getting medications? no   In the past 12 months, has lack of transportation kept you from meetings, work, or from getting things needed for daily living? No   Food Insecurity   Within the past 12 months, you worried that your food would run out before you got the money to buy more. Never true   Within the past 12 months, the food you bought just  didn't last and you didn't have money to get more. Never true   Stress   Do you feel stress - tense, restless, nervous, or anxious, or unable to sleep at night because your mind is troubled all the time - these days? Not at all   Social Connections   In a typical week, how many times do you talk on the phone with family, friends, or neighbors? Three   How often do you get together with friends or relatives? Three times   How often do you attend Buddhism or Worship services? 1 to 4   Do you belong to any clubs or organizations such as Buddhism groups, unions, fraternal or athletic groups, or school groups? Yes   How often do you attend meetings of the clubs or organizations you belong to? More than 4   Are you , , , , never , or living with a partner?    Alcohol Use   Q1: How often do you have a drink containing alcohol? Never   Q2: How many drinks containing alcohol do you have on a typical day when you are drinking? None   Q3: How often do you have six or more drinks on one occasion? Never   OTHER   Name(s) of People in Home Ellie Carmen (wife); Inés Carmen(Daughter)     PCP Dr. Randy Evans. DME in home includes rolling walker. [facility]. Family wanting Nursing Placement. Awaiting family to visit the hospital to question what their 3 preferences are for nursing home placement.     Randy Evans,   Department of Hospital Medicine  [facility] - Medical Surgical Unit

## 2023-12-28 NOTE — PROGRESS NOTES
12/28/23 0744   Discharge Assessment   Assessment Type Discharge Planning Assessment   Confirmed/corrected address, phone number and insurance Yes   Confirmed Demographics Correct on Facesheet   Source of Information patient;family   When was your last doctors appointment? 12/27/23   People in Home spouse;child(adolfo), adult   Do you expect to return to your current living situation? No   Do you have help at home or someone to help you manage your care at home? No   Prior to hospitilization cognitive status: Unable to Assess   Current cognitive status: Unable to Assess   Walking or Climbing Stairs Difficulty yes   Walking or Climbing Stairs ambulation difficulty, requires equipment   Dressing/Bathing Difficulty yes   Dressing/Bathing bathing difficulty, requires equipment   Equipment Currently Used at Home walker, rolling   Readmission within 30 days? No   Patient currently being followed by outpatient case management? Unable to determine (comments)   Do you currently have service(s) that help you manage your care at home? Yes   Name and Contact number of agency [facility]   Is the pt/caregiver preference to resume services with current agency Yes   Do you take prescription medications? Yes   Do you have prescription coverage? Yes   Coverage Medicare   Do you have any problems affording any of your prescribed medications? No   Is the patient taking medications as prescribed? yes   Are you on dialysis? No   Do you take coumadin? No   Discharge Plan A Skilled Nursing Facility   DME Needed Upon Discharge  none   Discharge Plan discussed with: Patient   Transition of Care Barriers None   Physical Activity   On average, how many days per week do you engage in moderate to strenuous exercise (like a brisk walk)? 0 days   On average, how many minutes do you engage in exercise at this level? 0 min   Financial Resource Strain   How hard is it for you to pay for the very basics like food, housing, medical care, and  heating? Not hard   Housing Stability   In the last 12 months, was there a time when you were not able to pay the mortgage or rent on time? N   In the last 12 months, how many places have you lived? 1   In the last 12 months, was there a time when you did not have a steady place to sleep or slept in a shelter (including now)? N   Transportation Needs   In the past 12 months, has lack of transportation kept you from medical appointments or from getting medications? no   In the past 12 months, has lack of transportation kept you from meetings, work, or from getting things needed for daily living? No   Food Insecurity   Within the past 12 months, you worried that your food would run out before you got the money to buy more. Never true   Within the past 12 months, the food you bought just didn't last and you didn't have money to get more. Never true   Stress   Do you feel stress - tense, restless, nervous, or anxious, or unable to sleep at night because your mind is troubled all the time - these days? Not at all   Social Connections   In a typical week, how many times do you talk on the phone with family, friends, or neighbors? Three   How often do you get together with friends or relatives? Three times   How often do you attend Tenriism or Judaism services? 1 to 4   Do you belong to any clubs or organizations such as Tenriism groups, unions, fraternal or athletic groups, or school groups? Yes   How often do you attend meetings of the clubs or organizations you belong to? More than 4   Are you , , , , never , or living with a partner?    Alcohol Use   Q1: How often do you have a drink containing alcohol? Never   Q2: How many drinks containing alcohol do you have on a typical day when you are drinking? None   Q3: How often do you have six or more drinks on one occasion? Never   OTHER   Name(s) of People in Home Ellie Carmen (wife); Inés Bobgloria(Daughter)     PCP Dr. Padilla  Nathan. DME in home includes rolling walker. Currently active with [facility]. Family wanting Nursing Placement. Awaiting family to visit the hospital to question what their 3 preferences are for nursing home placement.

## 2023-12-28 NOTE — SUBJECTIVE & OBJECTIVE
Patient History               Medical as of 12/28/2023       Past Medical History       Diagnosis Date Comments Source    Anasarca -- -- Provider    Atrial fibrillation -- -- Provider    Bradycardia -- -- Provider    CAD (coronary artery disease) -- -- Provider    Chemotherapy-induced neuropathy -- -- Provider    Crohn's disease of both small and large intestine -- -- Provider    Diverticulosis -- -- Provider    DJD (degenerative joint disease) -- -- Provider    Former tobacco use -- -- Provider    Gout -- -- Provider    H/O cardiac catheterization -- -- Provider    History of colon cancer, stage III 05/20/2011 -- Provider    HLD (hyperlipidemia) -- -- Provider    HTN (hypertension) -- -- Provider    Hypocalcemia -- -- Provider    Hypokalemia -- -- Provider    Hypotension -- -- Provider    Neuropathy -- -- Provider    Osteoarthritis -- -- Provider    Personal history of colonic polyps 10/24/2017 s/p polypectomy - Dr Kenny Vargas Provider    Physical deconditioning -- -- Provider    Severe protein-calorie malnutrition -- -- Provider    Urinary retention -- -- Provider    Weakness -- -- Provider                          Surgical as of 12/28/2023       Past Surgical History       Procedure Laterality Date Comments Source    CARDIAC CATHETERIZATION -- 10/14/2009 Dr Tim Bryan Provider    LUMBAR DISCECTOMY -- -- -- Provider    HEMICOLECTOMY, RIGHT, LAPAROSCOPIC -- 05/20/2011 Dr Elijah Huntley Provider    COLONOSCOPY W/ BIOPSIES AND POLYPECTOMY -- 05/30/2012 3mm polyp - Dr Kenny Vargas Provider    MEDIPORT INSERTION, SINGLE -- 06/16/2011 Dr Elijah Huntley Provider    COLONOSCOPY W/ BIOPSIES AND POLYPECTOMY -- 10/24/2017 2 polyps - Dr Kenny Vargas Provider    COLONOSCOPY W/ BIOPSIES AND POLYPECTOMY -- 07/22/2014 2 polyps - Dr Kenny Vargas Provider    COLONOSCOPY N/A 06/27/2023 Procedure: COLONOSCOPY;  Surgeon: Kenny Vargas MD;  Location: [facility];  Service: Gastroenterology;  Laterality:  N/A; Provider    APPENDECTOMY -- -- -- Provider                          Family as of 12/28/2023       Problem Relation Name Age of Onset Comments Source    No Known Problems Mother -- -- -- Provider    Other Father -- 42 intestinal rupture - Cause of death Provider                  Tobacco Use as of 12/28/2023       Smoking Status Smoking Start Date Quit Date Current Packs/Day Average Packs/Day    Former -- -- --       Smokeless Status Smokeless Type Smokeless Quit Date    Never -- --      Source    --                  Alcohol Use as of 12/28/2023       Alcohol Use Drinks/Week Alcohol/Week Comments Source    Not Currently   -- -- Provider                  Drug Use as of 12/28/2023       Drug Use Types Frequency Comments Source    Never -- -- -- Provider                  Sexual Activity as of 12/28/2023       Sexually Active Birth Control Partners Comments Source    Not Currently -- -- -- Provider                  Activities of Daily Living as of 12/28/2023    None               Social Documentation as of 12/28/2023    None               Occupational as of 12/28/2023    None               Socioeconomic as of 12/28/2023       Marital Status Spouse Name Number of Children Years Education Education Level Preferred Language Ethnicity Race Source     -- -- -- -- English Not  or /a White --                  Pertinent History       Question Response Comments    Lives with -- --    Place in Birth Order -- --    Lives in -- --    Number of Siblings -- --    Raised by -- --    Legal Involvement -- --    Childhood Trauma -- --    Criminal History of -- --    Financial Status -- --    Highest Level of Education -- --    Does patient have access to a firearm? -- --     Service -- --    Primary Leisure Activity -- --    Spirituality -- --          Past Medical History:   Diagnosis Date    Anasarca     Atrial fibrillation     Bradycardia     CAD (coronary artery disease)     Chemotherapy-induced  neuropathy     Crohn's disease of both small and large intestine     Diverticulosis     DJD (degenerative joint disease)     Former tobacco use     Gout     H/O cardiac catheterization     History of colon cancer, stage III 05/20/2011    HLD (hyperlipidemia)     HTN (hypertension)     Hypocalcemia     Hypokalemia     Hypotension     Neuropathy     Osteoarthritis     Personal history of colonic polyps 10/24/2017    s/p polypectomy - Dr Kenny Vargas    Physical deconditioning     Severe protein-calorie malnutrition     Urinary retention     Weakness      Past Surgical History:   Procedure Laterality Date    APPENDECTOMY      CARDIAC CATHETERIZATION  10/14/2009    Dr Tim Bryan    COLONOSCOPY N/A 06/27/2023    Procedure: COLONOSCOPY;  Surgeon: Kenny Vargas MD;  Location: [facility];  Service: Gastroenterology;  Laterality: N/A;    COLONOSCOPY W/ BIOPSIES AND POLYPECTOMY  05/30/2012    3mm polyp - Dr Kenny Vargas    COLONOSCOPY W/ BIOPSIES AND POLYPECTOMY  10/24/2017    2 polyps - Dr Kenny Vargas    COLONOSCOPY W/ BIOPSIES AND POLYPECTOMY  07/22/2014    2 polyps - Dr Kenny Vargas    HEMICOLECTOMY, RIGHT, LAPAROSCOPIC  05/20/2011    Dr Elijah Huntley    LUMBAR DISCECTOMY      MEDIPORT INSERTION, SINGLE  06/16/2011    Dr Elijah Huntley     Family History       Problem Relation (Age of Onset)    No Known Problems Mother    Other Father (42)          Tobacco Use    Smoking status: Former     Types: Cigarettes    Smokeless tobacco: Never   Substance and Sexual Activity    Alcohol use: Not Currently    Drug use: Never    Sexual activity: Not Currently     Review of patient's allergies indicates:  No Known Allergies    No current facility-administered medications on file prior to encounter.     Current Outpatient Medications on File Prior to Encounter   Medication Sig    apixaban (ELIQUIS) 5 mg Tab Take 1 tablet (5 mg total) by mouth 2 (two) times a day.    gabapentin (NEURONTIN) 300 MG capsule Take 1  "capsule (300 mg total) by mouth 3 (three) times daily. (Patient taking differently: Take 300 mg by mouth daily as needed.)    megestroL (MEGACE) 400 mg/10 mL (10 mL) Susp Take 5 mLs (200 mg total) by mouth once daily.    metoprolol tartrate (LOPRESSOR) 25 MG tablet Take 1 tablet (25 mg total) by mouth 2 (two) times daily.    multivitamin (THERAGRAN) per tablet Take 1 tablet by mouth once daily.     Psychotherapeutics (From admission, onward)      None          Review of Systems  Strengths and Liabilities:  Strengths:  Supportive family, access to healthcare.  Liabilities:  Cognitive slippage, poor physical health    Objective:     Vital Signs (Most Recent):  Temp: 97.8 °F (36.6 °C) (12/28/23 1543)  Pulse: 87 (12/28/23 1543)  Resp: 18 (12/28/23 1543)  BP: 117/61 (12/28/23 1543)  SpO2: 99 % (12/28/23 1543) Vital Signs (24h Range):  Temp:  [97.5 °F (36.4 °C)-98.7 °F (37.1 °C)] 97.8 °F (36.6 °C)  Pulse:  [] 87  Resp:  [16-22] 18  SpO2:  [97 %-100 %] 99 %  BP: ()/(57-98) 117/61     Height: 5' 5" (165.1 cm)  Weight: 66.1 kg (145 lb 11.6 oz)  Body mass index is 24.25 kg/m².      Intake/Output Summary (Last 24 hours) at 12/28/2023 1629  Last data filed at 12/28/2023 1123  Gross per 24 hour   Intake 779.18 ml   Output 100 ml   Net 679.18 ml          Physical Exam    Mental status examination:  83-year-old white male currently in bed whose speech is slowly produced but when produced was well articulated without evidence of dysarthria.  His thought content demonstrated no clear evidence of any auditory or visual hallucinations.  There appeared to be some mild suspiciousness directed towards daughter but no overt well constructed paranoid delusions.  He denied intention to harm self.  He denied intention to harm others.  His insight and judgment deemed to be limited.    On cognitive assessment he was oriented to situation setting.  His immediate memory is 3/3 objects immediately.  1/3 objects 5 minutes.  2/3 objects " 5 minutes with prompting.  Long-term memory appeared to be intact.  Patient was somewhat concrete.  His overall fund of knowledge certainly showed evidence of slippage in terms of basic knowledge.  He showed impairment in terms of concentration and could not perform simple calculations nor serial threes without making areas.        Significant Labs: Last 72 Hours:   Recent Lab Results         12/28/23  0552   12/27/23  1900   12/27/23  1655   12/27/23  1649        Influenza A, Molecular     Not Detected         Influenza B, Molecular     Not Detected         Albumin/Globulin Ratio 0.7       0.8       Neutrophils Abs Calc 2.9547             Albumin 1.6       2.2       ALP 41       49       ALT 8       12       Appearance, UA     Cloudy         aPTT       33.4  Comment: For Minimal Heparin Infusion, the goal aPTT 64-85 seconds corresponds to an anti-Xa of 0.3-0.5.    For Low Intensity and High Intensity Heparin, the goal aPTT  seconds corresponds to an anti-Xa of 0.3-0.7       AST 12       16       Bacteria, UA     Many         Bands 1             Baso #       0.05       Basophil %       0.4       BILIRUBIN TOTAL 0.4       0.5       Bilirubin, UA     Negative         BNP       319.0       BUN 14.0       16.0       Calcium 7.3       8.0       Chloride 112       111       CO2 19       20       Color, UA     Yellow         Creatinine 1.09       1.36       eGFR >60       51       Eos #       0.01       Eosinophil %       0.1       Globulin, Total 2.3       2.9       Glucose 67       94       Glucose, UA     Negative         Hematocrit 34.6       40.7       Hemoglobin 11.1       13.2       Immature Grans (Abs)       0.21       Immature Granulocytes       1.8       INR       1.2       Ketones, UA     Negative         Lactate, Fabian   2.8     2.4       Leukocytes, UA     Trace         Lymph # 1.5008       0.96       LYMPH %       8.4       Lymphs % 32  Comment: Few Reactive lymphs seen             MCH 28.5       28.3        MCHC 32.1       32.4       MCV 88.9       87.2       Mono # 0.2345       0.68       Mono % 5       5.9       MPV 9.8       9.0       Mucous, UA     Moderate         Neut #       9.58       Neut %       83.4       Neutrophils Relative 62             NITRITE UA     Positive         nRBC 0.0       0.0       Occult Blood UA     2+         pH, UA     5.5         Platelet Estimate Adequate             Platelet Count 316       442       Potassium 3.2       3.7       PROTEIN TOTAL 3.9       5.1       Protein, UA     1+         Protime       12.8       RBC 3.89       4.67       RBC, UA     21-50         RDW 16.8       16.8       SARS-CoV2 (COVID-19) Qualitative PCR     Not Detected         Sodium 140       140       Specific Gravity,UA     1.025         Squam Epithel, UA     Few         Troponin I       0.012       Urobilinogen, UA     0.2         WBC, UA     21-50         WBC 4.69       11.49               Significant Imaging: CT: I have reviewed all pertinent results/findings within the past 24 hours.  Patient shows evidence of micro vascular changes.

## 2023-12-29 PROBLEM — E83.42 HYPOMAGNESEMIA: Status: ACTIVE | Noted: 2023-01-01

## 2023-12-29 NOTE — ASSESSMENT & PLAN NOTE
Will start on Zosyn while awaiting cultures. Will not start a fluoroquinolone due to patient's history of Atrial fibrillation.     12/29/23: Continue Zosyn 4.5g q8h until urine culture finalized for 7-10 days of total antibiotic therapy. Repeat CMP and magnesium for hypokalemia until 1/2/24.

## 2023-12-29 NOTE — DISCHARGE SUMMARY
"[facility]  Hospital Medicine  Discharge Summary      Patient Name: Supa Carmen  MRN: [id number]  JETHRO: [id number]  Patient Class: IP- Inpatient  Admission Date: 12/27/2023  Hospital Length of Stay: 2 days  Discharge Date and Time:  12/29/2023 12:11 PM  Attending Physician: Randy Evans DO   Discharging Provider: Randy Evans DO  Primary Care Provider: Randy Evans DO    Primary Care Team: Networked reference to record PCT     HPI:   83-year-old male with a past medical history of AFib on Eliquis, Crohn's disease, physical deconditioning as well as a new diagnosis of urinary retention with a urinary catheter in place since discharge. He was recently discharged from the hospital on Friday, 5 days ago for malnutrition. At his transitional care visit on 12/27/23, the patient stated that he "was here at the doctor because that's where he's supposed to be", but upon further evaluation ,the patient was alert and oriented to person, time, and place.The patient's wife and daughter did not feel safe at home with the patient due to his increasingly violent/agitated behavior. He has been getting aggravated with family members since going home. He was able to recall with great clarity the details of the altercations/incidents that lead to his family feeling unsafe. There is a firearm in the home.  He is very unsteady on his feet but no recent falls. They are concerned he may have a UTI.  Patient denies any complaints. I advised the patient to go to the ED for further medical evaluation for possible catheter associated UTI vs potential Dai-psych placement for potential altered mental status with some baseline dementia and behavioral changes. I did not feel comfortable starting medications for the patient's agitation in the outpatient setting given his declining health.        * No surgery found *      Hospital Course:   83-year-old male with a past medical history of AFib " "on Eliquis, Crohn's disease, physical deconditioning as well as a new diagnosis of urinary retention with a urinary catheter in place since discharge. He was recently discharged from the hospital on Friday, 5 days ago for malnutrition. At his transitional care visit on 12/27/23, the patient stated that he "was here at the doctor because that's where he's supposed to be", but upon further evaluation ,the patient was alert and oriented to person, time, and place.The patient's wife and daughter did not feel safe at home with the patient due to his increasingly violent/agitated behavior. He has been getting aggravated with family members since going home. He was able to recall with great clarity the details of the altercations/incidents that lead to his family feeling unsafe. There is a firearm in the home.  He is very unsteady on his feet but no recent falls. They are concerned he may have a UTI.  Patient denies any complaints. I advised the patient to go to the ED for further medical evaluation for possible catheter associated UTI vs potential Dai-psych placement for potential altered mental status with some baseline dementia and behavioral changes. I did not feel comfortable starting medications for the patient's agitation in the outpatient setting given his declining health.      12/29/23: Discharge to [facility] for SNF. Continue Zosyn until Urine culture finalized. Repeat CMP and Magnesium daily until 1/2/23 for hypokalemia.      Goals of Care Treatment Preferences:  Code Status: Full Code      Consults:   Consults (From admission, onward)          Status Ordering Provider     Inpatient consult to Psychiatry  Once        Provider:  Leon Brush MD    Acknowledged JAYCE ECHEVARRIA            Renal/  * Urinary tract infection associated with indwelling urethral catheter  Will start on Zosyn while awaiting cultures. Will not start a fluoroquinolone due to patient's history of Atrial " fibrillation.     12/29/23: Continue Zosyn 4.5g q8h until urine culture finalized for 7-10 days of total antibiotic therapy. Repeat CMP and magnesium for hypokalemia until 1/2/24.       Hypomagnesemia  Patient has Abnormal Magnesium: hypomagnesemia. Will continue to monitor electrolytes closely. Will replace the affected electrolytes and repeat labs to be done after interventions completed. The patient's magnesium results have been reviewed and are listed below.  Recent Labs   Lab 12/29/23  0457   MG 1.60        Hypokalemia  Patient has hypokalemia which is Acute and currently controlled. Most recent potassium levels reviewed-   Lab Results   Component Value Date    K 2.7 (LL) 12/29/2023   . Will continue potassium replacement per protocol and recheck repeat levels after replacement completed.     Repeat CMP and magnesium for hypokalemia until 1/2/24. Replaced on 12/29/23 with 40mEq of IV potassium and 1g Magnesium.       Final Active Diagnoses:    Diagnosis Date Noted POA    PRINCIPAL PROBLEM:  Urinary tract infection associated with indwelling urethral catheter [T83.511A, N39.0] 12/28/2023 Yes    Minor neurocognitive disorder [G31.84] 12/28/2023 Yes    Anasarca [R60.1] 12/15/2023 Yes    Severe protein-calorie malnutrition [E43] 12/15/2023 Yes    Bradycardia [R00.1] 12/15/2023 Yes    Hypomagnesemia [E83.42] 12/29/2023 Clinically Undetermined    Hypokalemia [E87.6] 12/19/2023 Yes    Physical deconditioning [R53.81] 12/18/2023 Yes    Weakness [R53.1] 12/15/2023 Yes    Urinary retention [R33.9] 12/15/2023 Yes    Hypotension [I95.9] 07/07/2023 Yes      Problems Resolved During this Admission:       Discharged Condition: fair    Disposition: Skilled Nursing Facility    Follow Up:    Patient Instructions:   No discharge procedures on file.    Significant Diagnostic Studies: Labs: CMP   Recent Labs   Lab 12/27/23  1649 12/28/23  0552 12/29/23  0457    140 140   K 3.7 3.2* 2.7*   CO2 20* 19* 17*   BUN 16.0 14.0 12.0    CREATININE 1.36* 1.09 1.09   CALCIUM 8.0* 7.3* 7.0*   ALBUMIN 2.2* 1.6* 1.5*   BILITOT 0.5 0.4 0.4   ALKPHOS 49 41 34*   AST 16 12 10   ALT 12 8 6    and All labs within the past 24 hours have been reviewed  Microbiology: Urine Culture    Lab Results   Component Value Date    LABURIN >/= 100,000 colonies/ml Gram-negative Rods (A) 12/27/2023       Pending Diagnostic Studies:       None           Medications:  Reconciled Home Medications:      Medication List        CONTINUE taking these medications      apixaban 5 mg Tab  Commonly known as: ELIQUIS  Take 1 tablet (5 mg total) by mouth 2 (two) times a day.     megestroL 400 mg/10 mL (10 mL) Susp  Commonly known as: MEGACE  Take 5 mLs (200 mg total) by mouth once daily.     metoprolol tartrate 25 MG tablet  Commonly known as: LOPRESSOR  Take 1 tablet (25 mg total) by mouth 2 (two) times daily.     multivitamin per tablet  Commonly known as: THERAGRAN  Take 1 tablet by mouth once daily.            STOP taking these medications      gabapentin 300 MG capsule  Commonly known as: NEURONTIN              Indwelling Lines/Drains at time of discharge:   Lines/Drains/Airways       Drain  Duration                  Urethral Catheter 12/15/23 Coude 16 Fr. 14 days                    Time spent on the discharge of patient: 35 minutes         Randy Evans DO  Department of Hospital Medicine  [facility]

## 2023-12-29 NOTE — ASSESSMENT & PLAN NOTE
Patient has hypokalemia which is Acute and currently controlled. Most recent potassium levels reviewed-   Lab Results   Component Value Date    K 2.7 (LL) 12/29/2023   . Will continue potassium replacement per protocol and recheck repeat levels after replacement completed.     Repeat CMP and magnesium for hypokalemia until 1/2/24. Replaced on 12/29/23 with 40mEq of IV potassium and 1g Magnesium.

## 2023-12-29 NOTE — NURSING
Report given to ROWAN Sutherland at [facility].  Patient is awaiting transport.  Denies complaints.

## 2023-12-29 NOTE — PROGRESS NOTES
Inpatient Nutrition Assessment    Admit Date: 12/27/2023   Total duration of encounter: 2 days   Patient Age: 83 y.o.    Nutrition Recommendation/Prescription     Continue Low Na+, 2 gm diet as tolerated.   Will provide Magic Cup BID (290 kcal & 90 gm protein per serving)  Monitor for meals and provide encouragement to increase intake  Continue Megace  Consider enteral nutrition due to continued weight loss and poor appetite and intake (chronic)  Daily weights    Communication of Recommendations: reviewed with nurse and reviewed with patient    Nutrition Assessment     Malnutrition Assessment/Nutrition-Focused Physical Exam    Malnutrition Context: chronic illness (12/29/23 1011)  Malnutrition Level: severe (12/29/23 1011)  Energy Intake (Malnutrition): less than 75% for greater than or equal to 3 months (12/29/23 1011)  Weight Loss (Malnutrition): greater than 5% in 1 month (12/29/23 1011)  Subcutaneous Fat (Malnutrition): moderate depletion (12/29/23 1011)  Orbital Region (Subcutaneous Fat Loss): moderate depletion  Upper Arm Region (Subcutaneous Fat Loss): moderate depletion     Muscle Mass (Malnutrition): moderate depletion (12/29/23 1011)  Shinto Region (Muscle Loss): moderate depletion  Clavicle Bone Region (Muscle Loss): moderate depletion  Clavicle and Acromion Bone Region (Muscle Loss): moderate depletion                          A minimum of two characteristics is recommended for diagnosis of either severe or non-severe malnutrition.    Chart Review    Reason Seen: continuous nutrition monitoring    Malnutrition Screening Tool Results   Have you recently lost weight without trying?: No  Have you been eating poorly because of a decreased appetite?: No   MST Score: 0   Diagnosis:  UTI associated with indwelling urethral catheter, hypotension, minor neurocognitive d/o, severe protein-calorie malnutrition, anasarca, bradycardia, physical deconditioning, urine retention, weakness    Relevant Medical History:  "a-fib, Crohn's disease, CAD, chemo induced neuropathy, diverticulosis, DJD, gout, hx stage 3 colon cancer, HLD, HTN, hypocalcemia, hypokalemia, severe protein calorie malnutrition    Scheduled Medications:  apixaban, 5 mg, BID  megestroL, 200 mg, Daily  metoprolol tartrate, 25 mg, BID  multivitamin, 1 tablet, Daily  mupirocin, , BID  piperacillin-tazobactam (Zosyn) IV (PEDS and ADULTS) (extended infusion is not appropriate), 4.5 g, Q8H    Continuous Infusions:   PRN Medications: melatonin, sodium chloride 0.9%    Calorie Containing IV Medications: no significant kcals from medications at this time    Recent Labs   Lab 12/27/23  1649 12/28/23  0552 12/29/23  0457    140 140   K 3.7 3.2* 2.7*   CALCIUM 8.0* 7.3* 7.0*   MG  --   --  1.60   CHLORIDE 111* 112* 116*   CO2 20* 19* 17*   BUN 16.0 14.0 12.0   CREATININE 1.36* 1.09 1.09   EGFRNORACEVR 51 >60 >60   GLUCOSE 94 67* 75*   BILITOT 0.5 0.4 0.4   ALKPHOS 49 41 34*   ALT 12 8 6   AST 16 12 10   ALBUMIN 2.2* 1.6* 1.5*   WBC 11.49 4.69 3.85*   HGB 13.2* 11.1* 11.0*   HCT 40.7* 34.6* 33.2*     Nutrition Orders:  Diet Low Sodium, 2gm      Appetite/Oral Intake: poor/0-25% of meals  Factors Affecting Nutritional Intake: decreased appetite  Food/Taoist/Cultural Preferences: none reported  Food Allergies: none reported  Last Bowel Movement: 12/29/23  Wound(s):  intact    Comments    (12/29) Pt with hx of severe chronic malnutrition. Pt with continued poor appetite and intake. Consumes 0-25%of meals. Pt able to feed self with set-up assist. Pt needs to be monitored to encourage good intake. Reported no issues chewing or swallowing. Denied N/V/C. Stated he did have diarrhea this morning. Pt with # about 6-7 months ago which is a 70# (32.6%) weight loss-severe. Pt noted to have lost 10# (6.4%) in < 1 month     Anthropometrics    Height: 5' 5" (165.1 cm), Height Method: Stated  Last Weight: 66.1 kg (145 lb 11.6 oz) (12/27/23 2010), Weight Method: Bed Scale  BMI " (Calculated): 24.2  BMI Classification: normal (BMI 18.5-24.9)        Ideal Body Weight (IBW), Male: 136 lb     % Ideal Body Weight, Male (lb): 107.15 %                 Usual Body Weight (UBW), k.7 kg  % Usual Body Weight: 67.8     Usual Weight Provided By: family/caregiver    Wt Readings from Last 5 Encounters:   23 66.1 kg (145 lb 11.6 oz)   23 66.5 kg (146 lb 9.6 oz)   23 70.4 kg (155 lb 3.3 oz)   23 70.3 kg (154 lb 15.7 oz)   23 71.2 kg (157 lb)     Weight Change(s) Since Admission: new admit  Wt Readings from Last 1 Encounters:   23 66.1 kg (145 lb 11.6 oz)   23 1633 66.2 kg (146 lb)   Admit Weight: 66.2 kg (146 lb) (23 1633), Weight Method: Stated    Estimated Needs    Weight Used For Calorie Calculations: 66.1 kg (145 lb 11.6 oz)  Energy Calorie Requirements (kcal): 1983 kcal (30 kcal/kg)  Energy Need Method: Kcal/kg  Weight Used For Protein Calculations: 66.1 kg (145 lb 11.6 oz)  Protein Requirements: 99 gm (1.5 gm/kg)  Fluid Requirements (mL): 1983 ml (1 ml/kcal)    Enteral Nutrition     Patient not receiving enteral nutrition at this time.    Parenteral Nutrition     Patient not receiving parenteral nutrition support at this time.    Evaluation of Received Nutrient Intake    Calories: not meeting estimated needs  Protein: not meeting estimated needs    Patient Education     Not applicable.    Nutrition Diagnosis     PES: Inadequate oral intake related to anorexia as evidenced by consuming <50% of meal. (new)     PES: Severe chronic disease or condition related malnutrition related to inability to consume sufficient nutrients as evidenced by less than 75% needs met for greater than or equal to 3 months, moderate fat depletion, moderate muscle depletion, greater than 5% weight loss in 1 month, and greater than 10% weight loss in 6 months. (new)    Nutrition Interventions     Intervention(s): general/healthful diet, commercial food,  multivitamin/mineral supplement therapy, and collaboration with other providers    Goal: Meet greater than 80% of nutritional needs by follow-up. (new)  Goal: Maintain weight throughout hospitalization. (new)    Nutrition Goals & Monitoring     Dietitian will monitor: food and beverage intake, weight, electrolyte/renal panel, glucose/endocrine profile, and gastrointestinal profile    Nutrition Risk/Follow-Up: high (follow-up in 1-4 days)   Please consult if re-assessment needed sooner.  1.

## 2023-12-29 NOTE — PT/OT/SLP DISCHARGE
Physical Therapy Discharge Summary    Name: Supa Carmen  MRN: [id number]   Principal Problem: Urinary tract infection associated with indwelling urethral catheter     Patient Discharged from acute Physical Therapy on 2023.  Please refer to prior PT noted date on 2023 for functional status.     Assessment:     Patient appropriate for care in another setting.    Objective:     GOALS:   Multidisciplinary Problems       Physical Therapy Goals          Problem: Physical Therapy    Goal Priority Disciplines Outcome Goal Variances Interventions   Physical Therapy Goal     PT, PT/OT Adequate for Care Transition     Description: Patient will increase functional independence with mobility by performin. Supine to sit with Modified [place]  2. Sit to supine with Modified [place]  3. Sit to stand transfer with Modified [place]  4. Gait  x 400 feet with Modified Harnett using Rolling Walker.                          Reasons for Discontinuation of Therapy Services  Transfer to alternate level of care.      Plan:     Patient Discharged to: Skilled Nursing Facility.      2023

## 2023-12-29 NOTE — NURSING
Psych eval and H&P sent [place] [facility] [place].

## 2023-12-29 NOTE — HOSPITAL COURSE
"83-year-old male with a past medical history of AFib on Eliquis, Crohn's disease, physical deconditioning as well as a new diagnosis of urinary retention with a urinary catheter in place since discharge. He was recently discharged from the hospital on Friday, 5 days ago for malnutrition. At his transitional care visit on 12/27/23, the patient stated that he "was here at the doctor because that's where he's supposed to be", but upon further evaluation ,the patient was alert and oriented to person, time, and place.The patient's wife and daughter did not feel safe at home with the patient due to his increasingly violent/agitated behavior. He has been getting aggravated with family members since going home. He was able to recall with great clarity the details of the altercations/incidents that lead to his family feeling unsafe. There is a firearm in the home.  He is very unsteady on his feet but no recent falls. They are concerned he may have a UTI.  Patient denies any complaints. I advised the patient to go to the ED for further medical evaluation for possible catheter associated UTI vs potential Dai-psych placement for potential altered mental status with some baseline dementia and behavioral changes. I did not feel comfortable starting medications for the patient's agitation in the outpatient setting given his declining health.      12/29/23: Discharge to [facility] for SNF. Continue Zosyn until Urine culture finalized. Repeat CMP and Magnesium daily until 1/2/23 for hypokalemia.   "

## 2023-12-29 NOTE — PT/OT/SLP PROGRESS
"Name: Supa Carmen    : 1940 (83 y.o.)  MRN: [id number]           Patient Not Seen      Attempted to see pt for PT treatment this AM.  Upon PT arrival, pt appeared to be somewhat angry. PT suggested out of bed mobility and pt adamantly refused.  PT questioned pt regarding his refusal of mobility. Pt stated "can't you see?" and motioned toward the IV pole.  PT explained to pt that she would manage the IV pole. Pt then began to express frustration with the WC being in his room. PT confirmed that this is for use during therapy.  PT again attempted to encourage OOB activity, but pt again stated "I'm not doing that today".  Nsg was notified and aware of pt's slight agitation toward PT as well as his refusal of therapy. Pt is scheduled for D/C to SNF today.         "

## 2023-12-29 NOTE — ASSESSMENT & PLAN NOTE
Patient has Abnormal Magnesium: hypomagnesemia. Will continue to monitor electrolytes closely. Will replace the affected electrolytes and repeat labs to be done after interventions completed. The patient's magnesium results have been reviewed and are listed below.  Recent Labs   Lab 12/29/23  0457   MG 1.60

## 2024-01-01 ENCOUNTER — EXTERNAL VISIT (OUTPATIENT)
Dept: FAMILY MEDICINE | Facility: CLINIC | Age: 84
End: 2024-01-01
Payer: MEDICARE

## 2024-01-01 ENCOUNTER — EXTERNAL HOME HEALTH (OUTPATIENT)
Dept: HOME HEALTH SERVICES | Facility: HOSPITAL | Age: 84
End: 2024-01-01
Payer: MEDICARE

## 2024-01-01 ENCOUNTER — DOCUMENT SCAN (OUTPATIENT)
Dept: HOME HEALTH SERVICES | Facility: HOSPITAL | Age: 84
End: 2024-01-01
Payer: MEDICARE

## 2024-01-01 ENCOUNTER — HOSPITAL ENCOUNTER (EMERGENCY)
Facility: HOSPITAL | Age: 84
End: 2024-01-11
Attending: STUDENT IN AN ORGANIZED HEALTH CARE EDUCATION/TRAINING PROGRAM
Payer: MEDICARE

## 2024-01-01 VITALS
HEIGHT: 65 IN | WEIGHT: 146 LBS | BODY MASS INDEX: 24.32 KG/M2 | SYSTOLIC BLOOD PRESSURE: 134 MMHG | TEMPERATURE: 97 F | RESPIRATION RATE: 18 BRPM | HEART RATE: 88 BPM | OXYGEN SATURATION: 98 % | DIASTOLIC BLOOD PRESSURE: 76 MMHG

## 2024-01-01 VITALS
HEART RATE: 98 BPM | TEMPERATURE: 98 F | BODY MASS INDEX: 25.54 KG/M2 | WEIGHT: 153.31 LBS | RESPIRATION RATE: 18 BRPM | OXYGEN SATURATION: 98 % | DIASTOLIC BLOOD PRESSURE: 88 MMHG | SYSTOLIC BLOOD PRESSURE: 136 MMHG | HEIGHT: 65 IN

## 2024-01-01 DIAGNOSIS — R53.81 PHYSICAL DECONDITIONING: ICD-10-CM

## 2024-01-01 DIAGNOSIS — I48.91 ATRIAL FIBRILLATION: ICD-10-CM

## 2024-01-01 DIAGNOSIS — N30.00 ACUTE CYSTITIS WITHOUT HEMATURIA: ICD-10-CM

## 2024-01-01 DIAGNOSIS — E83.42 HYPOMAGNESEMIA: Primary | ICD-10-CM

## 2024-01-01 DIAGNOSIS — N50.89 SCROTAL EDEMA: Primary | ICD-10-CM

## 2024-01-01 DIAGNOSIS — R60.1 ANASARCA: ICD-10-CM

## 2024-01-01 LAB
ANION GAP SERPL CALC-SCNC: 7 MEQ/L
APPEARANCE UR: ABNORMAL
BACTERIA #/AREA URNS AUTO: ABNORMAL /HPF
BACTERIA BLD CULT: NORMAL
BACTERIA BLD CULT: NORMAL
BACTERIA UR CULT: ABNORMAL
BASOPHILS # BLD AUTO: 0.05 X10(3)/MCL
BASOPHILS NFR BLD AUTO: 0.4 %
BILIRUB UR QL STRIP.AUTO: NEGATIVE
BUN SERPL-MCNC: 17 MG/DL (ref 8.4–25.7)
CALCIUM SERPL-MCNC: 7.6 MG/DL (ref 8.8–10)
CHLORIDE SERPL-SCNC: 114 MMOL/L (ref 98–107)
CO2 SERPL-SCNC: 20 MMOL/L (ref 23–31)
COLOR UR AUTO: YELLOW
CREAT SERPL-MCNC: 1.16 MG/DL (ref 0.73–1.18)
CREAT/UREA NIT SERPL: 15
EOSINOPHIL # BLD AUTO: 0.03 X10(3)/MCL (ref 0–0.9)
EOSINOPHIL NFR BLD AUTO: 0.3 %
ERYTHROCYTE [DISTWIDTH] IN BLOOD BY AUTOMATED COUNT: 18.3 % (ref 11.5–17)
GFR SERPLBLD CREATININE-BSD FMLA CKD-EPI: >60 MLS/MIN/1.73/M2
GLUCOSE SERPL-MCNC: 67 MG/DL (ref 82–115)
GLUCOSE UR QL STRIP.AUTO: NEGATIVE
HCT VFR BLD AUTO: 31.5 % (ref 42–52)
HGB BLD-MCNC: 10.2 G/DL (ref 14–18)
IMM GRANULOCYTES # BLD AUTO: 0.3 X10(3)/MCL (ref 0–0.04)
IMM GRANULOCYTES NFR BLD AUTO: 2.6 %
KETONES UR QL STRIP.AUTO: NEGATIVE
LEUKOCYTE ESTERASE UR QL STRIP.AUTO: ABNORMAL
LYMPHOCYTES # BLD AUTO: 1.38 X10(3)/MCL (ref 0.6–4.6)
LYMPHOCYTES NFR BLD AUTO: 12.1 %
MAGNESIUM SERPL-MCNC: 1.5 MG/DL (ref 1.6–2.6)
MAGNESIUM SERPL-MCNC: 2.2 MG/DL (ref 1.6–2.6)
MCH RBC QN AUTO: 29 PG (ref 27–31)
MCHC RBC AUTO-ENTMCNC: 32.4 G/DL (ref 33–36)
MCV RBC AUTO: 89.5 FL (ref 80–94)
MONOCYTES # BLD AUTO: 0.58 X10(3)/MCL (ref 0.1–1.3)
MONOCYTES NFR BLD AUTO: 5.1 %
MUCOUS THREADS URNS QL MICRO: ABNORMAL /LPF
NEUTROPHILS # BLD AUTO: 9.07 X10(3)/MCL (ref 2.1–9.2)
NEUTROPHILS NFR BLD AUTO: 79.5 %
NITRITE UR QL STRIP.AUTO: NEGATIVE
NRBC BLD AUTO-RTO: 0 %
PH UR STRIP.AUTO: 5.5 [PH]
PLATELET # BLD AUTO: 272 X10(3)/MCL (ref 130–400)
PMV BLD AUTO: 9.4 FL (ref 7.4–10.4)
POCT GLUCOSE: 111 MG/DL (ref 70–110)
POTASSIUM SERPL-SCNC: 4.1 MMOL/L (ref 3.5–5.1)
PROT UR QL STRIP.AUTO: ABNORMAL
RBC # BLD AUTO: 3.52 X10(6)/MCL (ref 4.7–6.1)
RBC #/AREA URNS AUTO: ABNORMAL /HPF
RBC UR QL AUTO: ABNORMAL
SODIUM SERPL-SCNC: 141 MMOL/L (ref 136–145)
SP GR UR STRIP.AUTO: 1.02 (ref 1–1.03)
SQUAMOUS #/AREA URNS AUTO: ABNORMAL /HPF
TROPONIN I SERPL-MCNC: 0.03 NG/ML (ref 0–0.04)
TSH SERPL-ACNC: 3.86 UIU/ML (ref 0.35–4.94)
UROBILINOGEN UR STRIP-ACNC: 0.2
WBC # SPEC AUTO: 11.41 X10(3)/MCL (ref 4.5–11.5)
WBC #/AREA URNS AUTO: ABNORMAL /HPF

## 2024-01-01 PROCEDURE — 80048 BASIC METABOLIC PNL TOTAL CA: CPT | Performed by: STUDENT IN AN ORGANIZED HEALTH CARE EDUCATION/TRAINING PROGRAM

## 2024-01-01 PROCEDURE — 87086 URINE CULTURE/COLONY COUNT: CPT | Performed by: STUDENT IN AN ORGANIZED HEALTH CARE EDUCATION/TRAINING PROGRAM

## 2024-01-01 PROCEDURE — 99285 EMERGENCY DEPT VISIT HI MDM: CPT | Mod: 25

## 2024-01-01 PROCEDURE — 96367 TX/PROPH/DG ADDL SEQ IV INF: CPT

## 2024-01-01 PROCEDURE — 96365 THER/PROPH/DIAG IV INF INIT: CPT

## 2024-01-01 PROCEDURE — 83735 ASSAY OF MAGNESIUM: CPT | Performed by: STUDENT IN AN ORGANIZED HEALTH CARE EDUCATION/TRAINING PROGRAM

## 2024-01-01 PROCEDURE — 96366 THER/PROPH/DIAG IV INF ADDON: CPT

## 2024-01-01 PROCEDURE — 82962 GLUCOSE BLOOD TEST: CPT

## 2024-01-01 PROCEDURE — 93010 ELECTROCARDIOGRAM REPORT: CPT | Mod: ,,, | Performed by: INTERNAL MEDICINE

## 2024-01-01 PROCEDURE — 25000003 PHARM REV CODE 250: Performed by: STUDENT IN AN ORGANIZED HEALTH CARE EDUCATION/TRAINING PROGRAM

## 2024-01-01 PROCEDURE — 84484 ASSAY OF TROPONIN QUANT: CPT | Performed by: STUDENT IN AN ORGANIZED HEALTH CARE EDUCATION/TRAINING PROGRAM

## 2024-01-01 PROCEDURE — 84443 ASSAY THYROID STIM HORMONE: CPT | Performed by: STUDENT IN AN ORGANIZED HEALTH CARE EDUCATION/TRAINING PROGRAM

## 2024-01-01 PROCEDURE — 99308 SBSQ NF CARE LOW MDM 20: CPT | Mod: ,,, | Performed by: FAMILY MEDICINE

## 2024-01-01 PROCEDURE — 93005 ELECTROCARDIOGRAM TRACING: CPT

## 2024-01-01 PROCEDURE — 63600175 PHARM REV CODE 636 W HCPCS: Performed by: STUDENT IN AN ORGANIZED HEALTH CARE EDUCATION/TRAINING PROGRAM

## 2024-01-01 PROCEDURE — 81003 URINALYSIS AUTO W/O SCOPE: CPT | Performed by: STUDENT IN AN ORGANIZED HEALTH CARE EDUCATION/TRAINING PROGRAM

## 2024-01-01 PROCEDURE — 85025 COMPLETE CBC W/AUTO DIFF WBC: CPT | Performed by: STUDENT IN AN ORGANIZED HEALTH CARE EDUCATION/TRAINING PROGRAM

## 2024-01-01 RX ORDER — CALCIUM CARBONATE/VITAMIN D3 600MG-5MCG
1 TABLET ORAL DAILY
COMMUNITY
Start: 2024-01-01

## 2024-01-01 RX ORDER — SULFAMETHOXAZOLE AND TRIMETHOPRIM 800; 160 MG/1; MG/1
1 TABLET ORAL 2 TIMES DAILY
Qty: 14 TABLET | Refills: 0 | Status: SHIPPED | OUTPATIENT
Start: 2024-01-01 | End: 2024-01-01

## 2024-01-01 RX ORDER — MEGESTROL ACETATE 40 MG/ML
200 SUSPENSION ORAL DAILY
COMMUNITY
Start: 2023-01-01

## 2024-01-01 RX ORDER — MAGNESIUM SULFATE HEPTAHYDRATE 40 MG/ML
2 INJECTION, SOLUTION INTRAVENOUS
Status: COMPLETED | OUTPATIENT
Start: 2024-01-01 | End: 2024-01-01

## 2024-01-01 RX ORDER — FUROSEMIDE 20 MG/1
20 TABLET ORAL DAILY
COMMUNITY
Start: 2024-01-01

## 2024-01-01 RX ORDER — POTASSIUM CHLORIDE 20 MEQ/1
20 TABLET, EXTENDED RELEASE ORAL DAILY
COMMUNITY
Start: 2024-01-01

## 2024-01-01 RX ORDER — BUTYROSPERMUM PARKII(SHEA BUTTER), SIMMONDSIA CHINENSIS (JOJOBA) SEED OIL, ALOE BARBADENSIS LEAF EXTRACT .01; 1; 3.5 G/100G; G/100G; G/100G
250 LIQUID TOPICAL DAILY
COMMUNITY

## 2024-01-01 RX ORDER — TAMSULOSIN HYDROCHLORIDE 0.4 MG/1
0.4 CAPSULE ORAL DAILY
COMMUNITY
Start: 2024-01-01

## 2024-01-01 RX ADMIN — MAGNESIUM SULFATE HEPTAHYDRATE 2 G: 40 INJECTION, SOLUTION INTRAVENOUS at 12:01

## 2024-01-01 RX ADMIN — CEFTRIAXONE 1 G: 1 INJECTION, POWDER, FOR SOLUTION INTRAMUSCULAR; INTRAVENOUS at 02:01

## 2024-01-11 NOTE — ED PROVIDER NOTES
Encounter Date: 1/11/2024       History     Chief Complaint   Patient presents with    Irregular Heart Beat     Sent from MD office for afib evaluation. Pt does not report feeling unwell      Patient is a 83-year-old white gentleman with a history of dementia, AFib on anticoagulation and hypertension who presented to the ER today due to a EKG finding at his doctor's office of AFib RVR rate of 150.  Patient did not present with a EKG but the doctor's office was contacted by nursing staff who confirmed the reason for the patient to come in.  Patient feels asymptomatic with no palpitations, shortness of breath, chest pain.  He states he did have a Sandoval in place up until yesterday and it was removed and he was prescribed Flomax.  He states he has not voided since that time.  He denies any fevers, chills, cough, congestion, dysuria, hematuria.        Review of patient's allergies indicates:  No Known Allergies  Past Medical History:   Diagnosis Date    Anasarca     Atrial fibrillation     Bradycardia     CAD (coronary artery disease)     Chemotherapy-induced neuropathy     Crohn's disease of both small and large intestine     Diverticulosis     DJD (degenerative joint disease)     Former tobacco use     Gout     H/O cardiac catheterization     History of colon cancer, stage III 05/20/2011    HLD (hyperlipidemia)     HTN (hypertension)     Hypocalcemia     Hypokalemia     Hypotension     Neuropathy     Osteoarthritis     Personal history of colonic polyps 10/24/2017    s/p polypectomy - Dr Kenny Vargas    Physical deconditioning     Severe protein-calorie malnutrition     Urinary retention     Weakness      Past Surgical History:   Procedure Laterality Date    APPENDECTOMY      CARDIAC CATHETERIZATION  10/14/2009    Dr Tim Bryan    COLONOSCOPY N/A 06/27/2023    Procedure: COLONOSCOPY;  Surgeon: Kenny Vargas MD;  Location: [facility];  Service: Gastroenterology;  Laterality: N/A;    COLONOSCOPY W/ BIOPSIES AND  POLYPECTOMY  05/30/2012    3mm polyp - Dr Kenny Vargas    COLONOSCOPY W/ BIOPSIES AND POLYPECTOMY  10/24/2017    2 polyps - Dr Kenny Vargas    COLONOSCOPY W/ BIOPSIES AND POLYPECTOMY  07/22/2014    2 polyps - Dr Kenny Vargas    HEMICOLECTOMY, RIGHT, LAPAROSCOPIC  05/20/2011    Dr Elijah Huntley    LUMBAR DISCECTOMY      MEDIPORT INSERTION, SINGLE  06/16/2011    Dr Elijah Huntley     Family History   Problem Relation Age of Onset    No Known Problems Mother     Other Father 42        intestinal rupture - Cause of death     Social History     Tobacco Use    Smoking status: Former     Types: Cigarettes    Smokeless tobacco: Never   Substance Use Topics    Alcohol use: Not Currently    Drug use: Never     Review of Systems   Constitutional:  Negative for chills, fatigue and fever.   HENT:  Negative for congestion, sore throat and trouble swallowing.    Eyes:  Negative for pain and visual disturbance.   Respiratory:  Negative for cough, shortness of breath and wheezing.    Cardiovascular:  Negative for chest pain and palpitations.   Gastrointestinal:  Negative for abdominal pain, blood in stool, constipation, diarrhea, nausea and vomiting.   Genitourinary:  Positive for difficulty urinating. Negative for dysuria and hematuria.   Musculoskeletal:  Negative for back pain and myalgias.   Skin:  Negative for rash and wound.   Neurological:  Negative for seizures, syncope and headaches.   Psychiatric/Behavioral:  Negative for confusion. The patient is not nervous/anxious.        Physical Exam     Initial Vitals [01/11/24 1055]   BP Pulse Resp Temp SpO2   118/68 89 20 98.2 °F (36.8 °C) 100 %      MAP       --         Physical Exam    Nursing note and vitals reviewed.  Constitutional: He appears well-developed and well-nourished. No distress.   HENT:   Head: Normocephalic and atraumatic.   Eyes: Conjunctivae and EOM are normal. Right eye exhibits no discharge. Left eye exhibits no discharge. No scleral icterus.    Neck: No tracheal deviation present.   Normal range of motion.  Cardiovascular:  Normal rate and normal heart sounds.     Exam reveals no gallop and no friction rub.       No murmur heard.  Regular rate but irregular rhythm.   Pulmonary/Chest: Breath sounds normal. No respiratory distress. He has no wheezes. He has no rhonchi. He has no rales.   Abdominal: Abdomen is soft. He exhibits no distension. There is abdominal tenderness.   There is a palpable bladder noted in his suprapubic region that appears to have some mild tenderness without guarding.  Abdomen otherwise is unremarkable and no tenderness was appreciated. There is no rebound and no guarding.   Musculoskeletal:         General: No edema. Normal range of motion.      Cervical back: Normal range of motion.     Neurological: He is alert.   Skin: Skin is warm and dry. No rash and no abscess noted. No erythema. No pallor.   Psychiatric: His behavior is normal. Judgment normal.         ED Course   Procedures  Labs Reviewed   BASIC METABOLIC PANEL - Abnormal; Notable for the following components:       Result Value    Chloride 114 (*)     Carbon Dioxide 20 (*)     Glucose Level 67 (*)     Calcium Level Total 7.6 (*)     All other components within normal limits   MAGNESIUM - Abnormal; Notable for the following components:    Magnesium Level 1.50 (*)     All other components within normal limits   URINALYSIS, REFLEX TO URINE CULTURE - Abnormal; Notable for the following components:    Appearance, UA Cloudy (*)     Protein, UA Trace (*)     Blood, UA 3+ (*)     Leukocyte Esterase, UA 2+ (*)     All other components within normal limits   CBC WITH DIFFERENTIAL - Abnormal; Notable for the following components:    RBC 3.52 (*)     Hgb 10.2 (*)     Hct 31.5 (*)     MCHC 32.4 (*)     RDW 18.3 (*)     IG# 0.30 (*)     All other components within normal limits   URINALYSIS, MICROSCOPIC - Abnormal; Notable for the following components:    Bacteria, UA Few (*)      Mucous, UA Small (*)     RBC, UA 21-50 (*)     WBC, UA 50-99 (*)     Squamous Epithelial Cells, UA Few (*)     All other components within normal limits   TSH - Normal   TROPONIN I - Normal   MAGNESIUM - Normal   CULTURE, URINE   CBC W/ AUTO DIFFERENTIAL    Narrative:     The following orders were created for panel order CBC Auto Differential.  Procedure                               Abnormality         Status                     ---------                               -----------         ------                     CBC with Differential[[id number]]       Abnormal            Final result                 Please view results for these tests on the individual orders.   POCT GLUCOSE, HAND-HELD DEVICE     EKG Readings: (Independently Interpreted)   Initial Reading: No STEMI. Rhythm: Atrial Fibrillation. Heart Rate: 98. Ectopy: PVCs. ST Segments: Normal ST Segments. T Waves: Normal. Axis: Normal.     ECG Results              EKG 12-lead (Final result)  Result time 01/11/24 12:38:56      Final result by Interface, Lab In [facility] (01/11/24 12:38:56)                   Narrative:    Test Reason : I48.91,    Vent. Rate : 098 BPM     Atrial Rate : 098 BPM     P-R Int : 146 ms          QRS Dur : 068 ms      QT Int : 344 ms       P-R-T Axes : 094 068 074 degrees     QTc Int : 439 ms    Sinus rhythm with Premature supraventricular complexes and with occasional   Premature ventricular complexes  Otherwise normal ECG  Confirmed by John Boothe MD ([id number]) on 1/11/2024 12:38:50 PM    Referred By: [email or web address]   SELF           Confirmed By:John Boothe MD                                  Imaging Results              X-Ray Chest 1 View (Final result)  Result time 01/11/24 12:22:36      Final result by Marcie Woodall MD (01/11/24 12:22:36)                   Impression:      No acute cardiopulmonary abnormality.      Electronically signed by: Marcie Woodall  Date:    01/11/2024  Time:    12:22               Narrative:    EXAMINATION:  XR  CHEST 1 VIEW    CLINICAL HISTORY:  a fib;    TECHNIQUE:  Single frontal view of the chest was performed.    COMPARISON:  12/27/2023    FINDINGS:  LINES AND TUBES: EKG/telemetry leads overlie the chest.    MEDIASTINUM AND JACQUELYN: The cardiac silhouette is normal. Aortic atherosclerosis.    LUNGS: No lobar consolidation. No edema.    PLEURA:No pleural effusion. No pneumothorax.    OTHER: No acute osseous abnormality.                                       Medications   magnesium sulfate 2g in water 50mL IVPB (premix) (0 g Intravenous Stopped 1/11/24 1443)   cefTRIAXone (ROCEPHIN) 1 g in dextrose 5 % in water (D5W) 100 mL IVPB (MB+) (0 g Intravenous Stopped 1/11/24 1529)     Medical Decision Making  Differentials:  AFib, AFib RVR, electrolyte disturbance, dehydration, thyroid disorder, ACS  83-year-old well-appearing gentleman with a history of AFib presents from cardiology clinic for AFib RVR.  Upon arrival patient noted to have an EKG finding of AFib that is rate controlled in the mid 90s.  Volume status appears to be mild hypervolemia with trace pitting edema to lower extremities bilaterally.  Abdominal exam only significant for a palpable bladder.  Given patient's history of recent Sandoval removal I suspect obstruction.  Patient was monitored here for several hours in his AFib seemed to be rate controlled without any intervention from ER staff.  Electrolytes all seem to be relatively stable in comparison to his previous hypokalemia.  Hypomagnesemia was noted and he was replaced and repeat was 2.2.  His urine studies were consistent with a UTI but per chart review it appears this is chronic any may be colonized.  Based on the previous C and S I think Bactrim seems reasonable.  A dose of Rocephin was also given here and Bactrim DS was sent to pharmacy.    It appears patient was not been formally diagnosed with dementia and I discussed with the daughter at length the importance of having this done.  She states she will  reach out to his primary care doctor discuss a MSE being performed.  We also discussed possibly have in his magnesium level checked again within the week to to rule out a down trend in his magnesium which I presumed to likely be secondary to his Crohn's and possible malabsorption.  All questions were answered in layman's terms and return precautions were discussed.    Amount and/or Complexity of Data Reviewed  Labs: ordered. Decision-making details documented in ED Course.  Radiology: ordered. Decision-making details documented in ED Course.    Risk  Prescription drug management.                                      Clinical Impression:  Final diagnoses:  [I48.91] Atrial fibrillation  [E83.42] Hypomagnesemia (Primary)  [N30.00] Acute cystitis without hematuria          ED Disposition Condition    Discharge Stable          ED Prescriptions       Medication Sig Dispense Start Date End Date Auth. Provider    sulfamethoxazole-trimethoprim 800-160mg (BACTRIM DS) 800-160 mg Tab Take 1 tablet by mouth 2 (two) times daily. for 7 days 14 tablet 1/11/2024 1/18/2024 Andrew Syed MD          Follow-up Information       Follow up With Specialties Details Why Contact Info    [facility] - Emergency Dept Emergency Medicine  If symptoms worsen [address] [facility] [id number] [phone]    Randy Evans,  Family Medicine Schedule an appointment as soon as possible for a visit   [address] [facility] [address]  [phone]               Andrew Syed MD  01/11/24 [id number]

## 2024-01-27 NOTE — PROGRESS NOTES
Patient ID: [id number]     Chief Complaint: Nursing Home Visit      Nursing Home: [facility]    HPI:     Supa Carmen is a 83 y.o. male resident of [facility].  No other complaints today.       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, sequela  Lack of coordination  Malaise  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  Muscle wasting and atrophy, not elsewhere classified, left ankle and   foot  Muscle wasting and atrophy, not elsewhere classified, left lower leg  Muscle wasting and atrophy, not elsewhere classified, left thigh  Muscle wasting and atrophy, not elsewhere classified, right ankle and   foot  Muscle wasting and atrophy, not elsewhere classified, right lower leg  Muscle wasting and atrophy, not elsewhere classified, right thigh  Neuropathy  Osteoarthritis  Paralytic gait  Personal history of colonic polyps      Comment:  s/p polypectomy - Dr Kenny Vargas  Physical deconditioning  Polyosteoarthritis  Protein-calorie malnutrition  Severe protein-calorie malnutrition  Urinary retention  Urinary tract infection, site not specified  Vitamin deficiency  Weakness     Past Surgical History:   Procedure Laterality Date    APPENDECTOMY      CARDIAC CATHETERIZATION  10/14/2009    Dr Tim Bryan    COLONOSCOPY N/A 06/27/2023    Procedure:  COLONOSCOPY;  Surgeon: Kenny Vargas MD;  Location: [facility];  Service: Gastroenterology;  Laterality: N/A;    COLONOSCOPY W/ BIOPSIES AND POLYPECTOMY  2012    3mm polyp - Dr Kenny Vargas    COLONOSCOPY W/ BIOPSIES AND POLYPECTOMY  10/24/2017    2 polyps - Dr Kenny Vargas    COLONOSCOPY W/ BIOPSIES AND POLYPECTOMY  2014    2 polyps - Dr Kenny Vargas    HEMICOLECTOMY, RIGHT, LAPAROSCOPIC  2011    Dr Elijah Huntley    LUMBAR DISCECTOMY      MEDIPORT INSERTION, SINGLE  2011    Dr Elijah Huntley       Review of patient's allergies indicates:  No Known Allergies    Outpatient Medications Marked as Taking for the 24 encounter (External Visit) with Randy Evans, DO   Medication Sig Dispense Refill    apixaban (ELIQUIS) 5 mg Tab Take 1 tablet (5 mg total) by mouth 2 (two) times a day. 180 tablet 1    calcium-vitamin D tablet 600 mg-200 units Take 1 tablet by mouth once daily.      furosemide (LASIX) 20 MG tablet Take 20 mg by mouth once daily.      megestroL (MEGACE) 400 mg/10 mL (40 mg/mL) Susp Take 200 mg by mouth once daily.      metoprolol tartrate (LOPRESSOR) 25 MG tablet Take 1 tablet (25 mg total) by mouth 2 (two) times daily. 60 tablet 11    multivitamin (THERAGRAN) per tablet Take 1 tablet by mouth once daily.      potassium chloride SA (K-DUR,KLOR-CON) 20 MEQ tablet Take 20 mEq by mouth once daily.      Saccharomyces boulardii (FLORASTOR) 250 mg capsule Take 250 mg by mouth once daily.      [] sulfamethoxazole-trimethoprim 800-160mg (BACTRIM DS) 800-160 mg Tab Take 1 tablet by mouth 2 (two) times daily. for 7 days 14 tablet 0    tamsulosin (FLOMAX) 0.4 mg Cap Take 0.4 mg by mouth once daily.         Social History     Socioeconomic History    Marital status:    Tobacco Use    Smoking status: Former     Types: Cigarettes    Smokeless tobacco: Never   Substance and Sexual Activity    Alcohol use: Not Currently    Drug use: Never    Sexual  activity: Not Currently     Social Determinants of Health     Financial Resource Strain: Low Risk  (12/28/2023)    Overall Financial Resource Strain (CARDIA)     Difficulty of Paying Living Expenses: Not hard at all   Recent Concern: Financial Resource Strain - Medium Risk (12/15/2023)    Overall Financial Resource Strain (CARDIA)     Difficulty of Paying Living Expenses: Somewhat hard   Food Insecurity: No Food Insecurity (12/28/2023)    Hunger Vital Sign     Worried About Running Out of Food in the Last Year: Never true     Ran Out of Food in the Last Year: Never true   Transportation Needs: No Transportation Needs (12/28/2023)    PRAPARE - Transportation     Lack of Transportation (Medical): No     Lack of Transportation (Non-Medical): No   Physical Activity: Inactive (12/28/2023)    Exercise Vital Sign     Days of Exercise per Week: 0 days     Minutes of Exercise per Session: 0 min   Stress: No Stress Concern Present (12/28/2023)    [employer] - Occupational Stress Questionnaire     Feeling of Stress : Not at all   Social Connections: Socially Integrated (12/28/2023)    Social Connection and Isolation Panel [NHANES]     Frequency of Communication with Friends and Family: Three times a week     Frequency of Social Gatherings with Friends and Family: Three times a week     Attends [employer] Services: 1 to 4 times per year     Active Member of Clubs or Organizations: Yes     Attends Club or Organization Meetings: More than 4 times per year     Marital Status:    Housing Stability: Low Risk  (12/28/2023)    Housing Stability Vital Sign     Unable to Pay for Housing in the Last Year: No     Number of Places Lived in the Last Year: 1     Unstable Housing in the Last Year: No        Family History   Problem Relation Age of Onset    No Known Problems Mother     Other Father 42        intestinal rupture - Cause of death        Patient Care Team:  Randy Evans DO as PCP - General  "(Family Medicine)  Kenny Vargas MD as Consulting Physician (Gastroenterology)  Elijah Huntley MD as Consulting Physician (Colon and Rectal Surgery)  Kristal Huston MD as Consulting Physician (Interventional Cardiology)  Lyle Marroquin MD as Consulting Physician (Cardiology)  Andrew Gilmore MD as Consulting Physician (Gastroenterology)  Andrew Shaikh MD as Consulting Physician (Cardiology)  Care, [facility] (Home Health Services)     Subjective:     Review of Systems   Constitutional:  Negative for chills and fever.   Respiratory:  Negative for shortness of breath.    Cardiovascular:  Positive for leg swelling. Negative for chest pain.   Gastrointestinal:  Negative for constipation and diarrhea.   Skin:         Scrotal edema   Neurological:  Negative for dizziness and headaches.   Psychiatric/Behavioral:  The patient does not have insomnia.        See HPI for details  All Other ROS: Negative except as stated in HPI.       Objective:     /76   Pulse 88   Temp 97.1 °F (36.2 °C)   Resp 18   Ht 5' 4.96" (1.65 m)   Wt 66.2 kg (146 lb)   SpO2 98%   BMI 24.32 kg/m²     Physical Exam  Vitals reviewed. Exam conducted with a chaperone present.   Constitutional:       General: He is not in acute distress.     Appearance: Normal appearance. He is ill-appearing.   Cardiovascular:      Rate and Rhythm: Normal rate and regular rhythm.      Pulses: Normal pulses.      Heart sounds: Normal heart sounds. No murmur heard.     No friction rub. No gallop.   Pulmonary:      Effort: No respiratory distress.      Breath sounds: No wheezing, rhonchi or rales.   Genitourinary:     Penis: Tenderness and swelling present.       Testes:         Right: Tenderness and swelling present.         Left: Tenderness and swelling present.   Musculoskeletal:         General: No swelling.      Right lower leg: Edema present.      Left lower leg: Edema present.   Skin:     General: Skin is warm and dry.   Neurological:     "  General: No focal deficit present.      Mental Status: He is alert.   Psychiatric:         Mood and Affect: Mood normal.         Behavior: Behavior normal.         Assessment/Plan:     1. Scrotal edema  -likely secondary to ansarca and low oncotic pressure. Will contact Urology to set up appointment urgently.   2. Anasarca  -continue to encourage proper diet with protein supplementation  3. Physical deconditioning  -improved since arrival to facility.         Follow up:     No follow-ups on file. In addition to their scheduled follow up, the patient has also been instructed to follow up on as needed basis.

## 2024-01-30 ENCOUNTER — DOCUMENT SCAN (OUTPATIENT)
Dept: HOME HEALTH SERVICES | Facility: HOSPITAL | Age: 84
End: 2024-01-30
Payer: MEDICARE

## 2024-03-13 LAB — O+P STL MICRO: NORMAL

## 2025-05-23 NOTE — PLAN OF CARE
Problem: Adult Inpatient Plan of Care  Goal: Plan of Care Review  Outcome: Ongoing, Progressing  Goal: Patient-Specific Goal (Individualized)  Outcome: Ongoing, Progressing  Goal: Absence of Hospital-Acquired Illness or Injury  Outcome: Ongoing, Progressing  Goal: Optimal Comfort and Wellbeing  Outcome: Ongoing, Progressing  Goal: Readiness for Transition of Care  Outcome: Ongoing, Progressing     Problem: Impaired Wound Healing  Goal: Optimal Wound Healing  Outcome: Ongoing, Progressing     Problem: Hypertension Comorbidity  Goal: Blood Pressure in Desired Range  Outcome: Ongoing, Progressing     Problem: Pain Acute  Goal: Acceptable Pain Control and Functional Ability  Outcome: Ongoing, Progressing     Problem: Fall Injury Risk  Goal: Absence of Fall and Fall-Related Injury  Outcome: Ongoing, Progressing     Problem: Fatigue  Goal: Improved Activity Tolerance  Outcome: Ongoing, Progressing     Problem: Dysrhythmia  Goal: Normalized Cardiac Rhythm  Outcome: Ongoing, Progressing     Problem: Fluid Volume Excess  Goal: Fluid Balance  Outcome: Ongoing, Progressing     Problem: Chest Pain  Goal: Resolution of Chest Pain Symptoms  Outcome: Ongoing, Progressing     Problem: Skin Injury Risk Increased  Goal: Skin Health and Integrity  Outcome: Ongoing, Progressing      Standing/Walking/Toileting/Moving from bed to stretcher

## (undated) DEVICE — TUBE SUCTION MEDI-VAC STERILE

## (undated) DEVICE — SYR SLIP TIP 60 CC DISP

## (undated) DEVICE — KIT SURGICAL COLON .25 1.1OZ

## (undated) DEVICE — Device

## (undated) DEVICE — LINER SUCTION KV 5 2000ML

## (undated) DEVICE — GAUZE SPONGE 4X4 12PLY

## (undated) DEVICE — NEPTUNE 4 PORT MANIFOLD

## (undated) DEVICE — TUBE WATER AUXILIARY

## (undated) DEVICE — FORCEP ALLIGATOR BX NDL 2.8MM

## (undated) DEVICE — ADAPTER AIR/WATER CHANNEL CLEA